# Patient Record
Sex: FEMALE | Race: BLACK OR AFRICAN AMERICAN | Employment: FULL TIME | URBAN - METROPOLITAN AREA
[De-identification: names, ages, dates, MRNs, and addresses within clinical notes are randomized per-mention and may not be internally consistent; named-entity substitution may affect disease eponyms.]

---

## 2017-03-25 ENCOUNTER — HOSPITAL ENCOUNTER (EMERGENCY)
Age: 37
Discharge: HOME | End: 2017-03-25
Attending: EMERGENCY MEDICINE
Payer: COMMERCIAL

## 2017-03-25 VITALS
WEIGHT: 143 LBS | OXYGEN SATURATION: 100 % | HEIGHT: 60 IN | HEART RATE: 115 BPM | SYSTOLIC BLOOD PRESSURE: 173 MMHG | DIASTOLIC BLOOD PRESSURE: 93 MMHG | BODY MASS INDEX: 28.07 KG/M2 | RESPIRATION RATE: 20 BRPM | TEMPERATURE: 102.6 F

## 2017-03-25 DIAGNOSIS — J11.1 INFLUENZA-LIKE ILLNESS: Primary | ICD-10-CM

## 2017-03-25 PROCEDURE — 99283 EMERGENCY DEPT VISIT LOW MDM: CPT

## 2017-03-25 PROCEDURE — 74011250637 HC RX REV CODE- 250/637: Performed by: EMERGENCY MEDICINE

## 2017-03-25 RX ORDER — IBUPROFEN 600 MG/1
600 TABLET ORAL
Qty: 20 TAB | Refills: 0 | Status: SHIPPED | OUTPATIENT
Start: 2017-03-25 | End: 2017-05-09

## 2017-03-25 RX ORDER — IBUPROFEN 600 MG/1
600 TABLET ORAL
Status: COMPLETED | OUTPATIENT
Start: 2017-03-25 | End: 2017-03-25

## 2017-03-25 RX ADMIN — IBUPROFEN 600 MG: 600 TABLET, FILM COATED ORAL at 21:03

## 2017-03-25 NOTE — LETTER
[address] 
[facility] EMERGENCY DEPT 
[address] [facility] [address] [address] [phone] Work/School Note Date: 3/25/2017 To Whom It May concern: 
 
Khadijah Ron was seen and treated today in the emergency room by the following provider(s): 
Attending Provider: Leslie Ward MD.   
 
Khadijah Ron may return to work on 4/01/2017.  
 
Sincerely, 
 
 
 
 
Leslie Ward MD

## 2017-03-26 NOTE — ED TRIAGE NOTES
Patient arrives with c/o generalized body aches, cough and fever onset today. Patient states her son was diagnosed with the flu.

## 2017-03-26 NOTE — ED PROVIDER NOTES
HPI Comments: 31 y.o. female with past medical history significant for headaches and hypothyroidism who presents from home with chief complaint of generalized myalgia. Patient arrives complaining of severe generalized myalgia and chills that started this morning. Patient states symptoms started when she got to work and did not diminish. Patient also complains of a cough with \"blood taste\" and fever. Patient denies taking any medicine for symptoms. Patient is currently on menstrual period. Patient denies urinary symptoms and receiving flu vaccination this year. There are no other acute medical concerns at this time. Social hx: Non smoker  PCP: Thania Snow MD    Note written by Stanly Holter. Priscila Ardon, as dictated by Mat Browning MD 8:48 PM      The history is provided by the patient. Past Medical History:   Diagnosis Date    Hypothyroidism, adult 9/11/2015    Left-sided muscle weakness 9/11/2015    Other ill-defined conditions(799.89)     headaches    Radiculopathy of cervical region 9/11/2015       Past Surgical History:   Procedure Laterality Date    HX GYN      D&C, tubal ligation         No family history on file. Social History     Social History    Marital status: UNKNOWN     Spouse name: N/A    Number of children: N/A    Years of education: N/A     Occupational History    Not on file. Social History Main Topics    Smoking status: Never Smoker    Smokeless tobacco: Never Used    Alcohol use Yes      Comment: occasionally    Drug use: No    Sexual activity: Not on file     Other Topics Concern    Not on file     Social History Narrative         ALLERGIES: Flagyl [metronidazole]    Review of Systems   Constitutional: Positive for chills and fever. HENT: Negative for ear pain and sore throat. Eyes: Negative for photophobia and pain. Respiratory: Positive for cough. Negative for chest tightness and shortness of breath.     Cardiovascular: Negative for chest pain and leg swelling. Gastrointestinal: Negative for abdominal pain, nausea and vomiting. Genitourinary: Negative for difficulty urinating, dysuria and flank pain. Musculoskeletal: Positive for myalgias. Negative for back pain and neck pain. Skin: Negative for rash and wound. Neurological: Negative for dizziness, light-headedness and headaches. Vitals:    03/25/17 2031   BP: (!) 173/93   Pulse: (!) 115   Resp: 20   Temp: (!) 102.6 °F (39.2 °C)   SpO2: 100%   Weight: 64.9 kg (143 lb)   Height: 5' (1.524 m)            Physical Exam   Constitutional: She is oriented to person, place, and time. She appears well-developed and well-nourished. No distress. HENT:   Head: Normocephalic and atraumatic. Mouth/Throat: Oropharynx is clear and moist.   Eyes: Conjunctivae and EOM are normal. Pupils are equal, round, and reactive to light. Neck: Normal range of motion. Cardiovascular: Regular rhythm, normal heart sounds and intact distal pulses. Tachycardia present. No murmur heard. Pulmonary/Chest: Effort normal and breath sounds normal. No stridor. No respiratory distress. She has no wheezes. She has no rales. Abdominal: Soft. Bowel sounds are normal. There is no tenderness. Musculoskeletal: Normal range of motion. She exhibits no edema or tenderness. Neurological: She is alert and oriented to person, place, and time. No cranial nerve deficit. Skin: Skin is warm and dry. She is not diaphoretic. Psychiatric: She has a normal mood and affect. Nursing note and vitals reviewed. MDM  Number of Diagnoses or Management Options  Influenza-like illness:   Diagnosis management comments: Patient with suspected influenza - sick contact at home - no need for testing  Will d/c home with supportive care instructions.       ED Course       Procedures

## 2017-03-26 NOTE — DISCHARGE INSTRUCTIONS
We hope that we have addressed all of your medical concerns. The examination and treatment you received in the Emergency Department were for an emergent problem and were not intended as complete care. It is important that you follow up with your healthcare provider(s) for ongoing care. If your symptoms worsen or do not improve as expected, and you are unable to reach your usual health care provider(s), you should return to the Emergency Department. Today's healthcare is undergoing tremendous change, and patient satisfaction surveys are one of the many tools to assess the quality of medical care. You may receive a survey from the [employer] regarding your experience in the Emergency Department. I hope that your experience has been completely positive, particularly the medical care that I provided. As such, please participate in the survey; anything less than excellent does not meet my expectations or intentions. [facility] and [facility] participate in nationally recognized quality of care measures. If your blood pressure is greater than 120/80, as reported below, we urge that you seek medical care to address the potential of high blood pressure, commonly known as hypertension. Hypertension can be hereditary or can be caused by certain medical conditions, pain, stress, or \"white coat syndrome. \"       Please make an appointment with your health care provider(s) for follow up of your Emergency Department visit. VITALS:   Patient Vitals for the past 8 hrs:   Temp Pulse Resp BP SpO2   03/25/17 2031 (!) 102.6 °F (39.2 °C) (!) 115 20 (!) 173/93 100 %          Thank you for allowing us to provide you with medical care today. We realize that you have many choices for your emergency care needs. Please choose us in the future for any continued health care needs. Charlie Vuong, [address] [facility] Emergency [place]: [phone]            No results found for this or any previous visit (from the past 24 hour(s)). No results found.

## 2017-03-27 ENCOUNTER — PATIENT OUTREACH (OUTPATIENT)
Dept: FAMILY MEDICINE CLINIC | Age: 37
End: 2017-03-27

## 2017-03-27 NOTE — PROGRESS NOTES
[address] [facility]  ED  Discharge Follow-Up        Patient listed on discharge [facility]) report on 3/25/17. Patient discharged from [facility] for Influenze-like illness. Panel Manager contacted the patient by telephone to perform post ED discharge assessment. Verified  and address with patient as identifiers. Provided introduction to self, and explanation of the Panel Manger role. Patient reports that she is doing a \"little better\". Patient stated that fever continue to come and go. Patient stated that she continue to take prescribed Ibuprofen. Patient BP and HR up increased at ED. Patient questioned if she was ever on BP medication and patient stated that she was once but was taken off. Medication: Patient discharged with new medication (Ibuprofen 600mg)  Performed medication reconciliation with patient, and patient verbalizes understanding of administration of home medications. There were no barriers to obtaining medications identified at this time. Discharge Instructions :  Reviewed discharge instructions with patient. Patient verbalizes understanding of discharge instructions and follow-up care. Plan: Patient to follow up with PCP tomorrow-should have BP checked and labs drawn     Goals        Post ED     Establish PCP relationships and regularly scheduled appointments. Patient will establish relationship with Dr. Kavin Oliva               PCP/Specialist follow up: Patient scheduled to follow up with Dr. Kavin Oliva on 3/28/17. Patient given an opportunity to ask questions. No other clinical/social/functional needs noted. The patient agrees to contact the PCP office for questions related to their healthcare. The patient expressed thanks, offered no additional questions and ended the call.

## 2017-03-28 ENCOUNTER — OFFICE VISIT (OUTPATIENT)
Dept: FAMILY MEDICINE CLINIC | Age: 37
End: 2017-03-28

## 2017-03-28 VITALS
HEIGHT: 60 IN | HEART RATE: 103 BPM | WEIGHT: 151.2 LBS | TEMPERATURE: 98.6 F | RESPIRATION RATE: 20 BRPM | SYSTOLIC BLOOD PRESSURE: 137 MMHG | DIASTOLIC BLOOD PRESSURE: 97 MMHG | BODY MASS INDEX: 29.68 KG/M2

## 2017-03-28 DIAGNOSIS — R68.89 FLU-LIKE SYMPTOMS: Primary | ICD-10-CM

## 2017-03-28 NOTE — PATIENT INSTRUCTIONS
DASH Diet: After Your Visit   Your Care Instructions   The DASH diet is an eating plan that can help lower your blood pressure. DASH stands for Dietary Approaches to Stop Hypertension. Hypertension is high blood pressure. The DASH diet focuses on eating foods that are high in calcium, potassium, and magnesium. These nutrients can lower blood pressure. The foods that are highest in these nutrients are fruits, vegetables, low-fat dairy products, nuts, seeds, and legumes. But taking calcium, potassium, and magnesium supplements instead of eating foods that are high in those nutrients does not have the same effect. The DASH diet also includes whole grains, fish, and poultry. The DASH diet is one of several lifestyle changes your doctor may recommend to lower your high blood pressure. Your doctor may also want you to decrease the amount of sodium in your diet. Lowering sodium while following the DASH diet can lower blood pressure even further than just the DASH diet alone. Follow-up care is a key part of your treatment and safety. Be sure to make and go to all appointments, and call your doctor if you are having problems. Its also a good idea to know your test results and keep a list of the medicines you take. How can you care for yourself at home? Following the DASH diet   Eat 4 to 5 servings of fruit each day. A serving is 1 medium-sized piece of fruit, 1/2 cup chopped or canned fruit, 1/4 cup dried fruit, or 4 ounces (1/2 cup) of fruit juice. Choose fruit more often than fruit juice. Eat 4 to 5 servings of vegetables each day. A serving is 1 cup of lettuce or raw leafy vegetables, 1/2 cup of chopped or cooked vegetables, or 4 ounces (1/2 cup) of vegetable juice. Choose vegetables more often than vegetable juice. Get 3 servings of low-fat dairy each day. A serving is 8 ounces of milk, 1 cup of yogurt, or 1 1/2 ounces of cheese. Eat 7 to 8 servings of grains each day.  A serving is 1 slice of bread, 1 ounce of dry cereal, or 1/2 cup of cooked rice, pasta, or cooked cereal. Try to choose whole-grain products as much as possible. Limit lean meat, poultry, and fish to 6 ounces each day. Six ounces is about the size of two decks of cards. Eat 4 to 5 servings of nuts, seeds, and legumes (cooked dried beans, lentils, and split peas) each week. A serving is 1/3 cup of nuts, 2 tablespoons of seeds, or 1/2 cup cooked dried beans or peas. Tips for success   Start small. Do not try to make dramatic changes to your diet all at once. You might feel that you are missing out on your favorite foods and then be more likely to not follow the plan. Make small changes, and stick with them. Once those changes become habit, add a few more changes. Try some of the following:   Make it a goal to eat a fruit or vegetable at every meal and at snacks. This will make it easy to get the recommended amount of fruits and vegetables each day. Try yogurt topped with fruit and nuts for a snack or healthy dessert. Add lettuce, tomato, cucumber, and onion to sandwiches. Combine a ready-made pizza crust with low-fat mozzarella cheese and lots of vegetable toppings. Try using tomatoes, squash, spinach, broccoli, carrots, cauliflower, and onions. Have a variety of cut-up vegetables with a low-fat dip as an appetizer instead of chips and dip. Sprinkle sunflower seeds or chopped almonds over salads. Or try adding chopped walnuts or almonds to cooked vegetables. Try some vegetarian meals using beans and peas. Add garbanzo or kidney beans to salads. Make burritos and tacos with mashed bustamante beans or black beans. Where can you learn more? Go to DealExplorer.be   Enter H967 in the search box to learn more about \"DASH Diet: After Your Visit. \"    © 0723-4173 Healthwise, Incorporated. Care instructions adapted under license by Tristian Lynn (which disclaims liability or warranty for this information).  This care instruction is for use with your licensed healthcare professional. If you have questions about a medical condition or this instruction, always ask your healthcare professional. [place] [address] any warranty or liability for your use of this information.    Content Version: 5.8.166727; Last Revised: March 24, 2010

## 2017-03-28 NOTE — MR AVS SNAPSHOT
Visit Information Date & Time Provider Department Dept. Phone Encounter #  
 3/28/2017 10:45 AM Armond EstradaDanyell 34 [id number] Follow-up Instructions Return in about 6 weeks (around 5/9/2017) for blood pressure. Your Appointments 3/31/2017 10:45 AM  
ROUTINE CARE with Armond Estrada MD  
[address] [place] [address]) Appt Note: ED follow up/COPD  
 [address] [facility] [address] [facility]  
[phone]  
  
   
 [address] [place] [address] [address] [address] Upcoming Health Maintenance Date Due DTaP/Tdap/Td series (1 - Tdap) 8/29/2003 PAP AKA CERVICAL CYTOLOGY 8/29/2003 INFLUENZA AGE 9 TO ADULT 8/1/2016 Allergies as of 3/28/2017  Review Complete On: 3/28/2017 By: Armond Estrada MD  
  
 Severity Noted Reaction Type Reactions Flagyl [Metronidazole]  01/28/2014    Diarrhea Current Immunizations  Never Reviewed No immunizations on file. Not reviewed this visit You Were Diagnosed With   
  
 Codes Comments Flu-like symptoms    -  Primary ICD-10-CM: R68.89 ICD-9-CM: 780.99 Elevated blood pressure     ICD-10-CM: I10 
ICD-9-CM: 401.9 Vitals BP Pulse Temp Resp Height(growth percentile) Weight(growth percentile) (!) 137/97 (BP 1 Location: Left arm, BP Patient Position: Sitting) (!) 103 98.6 °F (37 °C) (Oral) 20 5' (1.524 m) 151 lb 3.2 oz (68.6 kg) LMP BMI OB Status Smoking Status 03/25/2017 29.53 kg/m2 Having regular periods Never Smoker BMI and BSA Data Body Mass Index Body Surface Area  
 29.53 kg/m 2 1.7 m 2 Preferred Pharmacy Pharmacy Name Phone [facility] [address] [place] [address], [address] [place] [address] [phone] Your Updated Medication List  
  
   
This list is accurate as of: 3/28/17 12:01 PM.  Always use your most recent med list.  
  
  
  
  
 ibuprofen 600 mg tablet Commonly known as:  MOTRIN Take 1 Tab by mouth every six (6) hours as needed for Pain. Follow-up Instructions Return in about 6 weeks (around 5/9/2017) for blood pressure. Patient Instructions DASH Diet: After Your Visit Your Care Instructions The DASH diet is an eating plan that can help lower your blood pressure. DASH stands for Dietary Approaches to Stop Hypertension. Hypertension is high blood pressure. The DASH diet focuses on eating foods that are high in calcium, potassium, and magnesium. These nutrients can lower blood pressure. The foods that are highest in these nutrients are fruits, vegetables, low-fat dairy products, nuts, seeds, and legumes. But taking calcium, potassium, and magnesium supplements instead of eating foods that are high in those nutrients does not have the same effect. The DASH diet also includes whole grains, fish, and poultry. The DASH diet is one of several lifestyle changes your doctor may recommend to lower your high blood pressure. Your doctor may also want you to decrease the amount of sodium in your diet. Lowering sodium while following the DASH diet can lower blood pressure even further than just the DASH diet alone. Follow-up care is a key part of your treatment and safety. Be sure to make and go to all appointments, and call your doctor if you are having problems. Its also a good idea to know your test results and keep a list of the medicines you take. How can you care for yourself at home? Following the DASH diet Eat 4 to 5 servings of fruit each day. A serving is 1 medium-sized piece of fruit, 1/2 cup chopped or canned fruit, 1/4 cup dried fruit, or 4 ounces (1/2 cup) of fruit juice. Choose fruit more often than fruit juice. Eat 4 to 5 servings of vegetables each day.  A serving is 1 cup of lettuce or raw leafy vegetables, 1/2 cup of chopped or cooked vegetables, or 4 ounces (1/2 cup) of vegetable juice. Choose vegetables more often than vegetable juice. Get 3 servings of low-fat dairy each day. A serving is 8 ounces of milk, 1 cup of yogurt, or 1 1/2 ounces of cheese. Eat 7 to 8 servings of grains each day. A serving is 1 slice of bread, 1 ounce of dry cereal, or 1/2 cup of cooked rice, pasta, or cooked cereal. Try to choose whole-grain products as much as possible. Limit lean meat, poultry, and fish to 6 ounces each day. Six ounces is about the size of two decks of cards. Eat 4 to 5 servings of nuts, seeds, and legumes (cooked dried beans, lentils, and split peas) each week. A serving is 1/3 cup of nuts, 2 tablespoons of seeds, or 1/2 cup cooked dried beans or peas. Tips for success Start small. Do not try to make dramatic changes to your diet all at once. You might feel that you are missing out on your favorite foods and then be more likely to not follow the plan. Make small changes, and stick with them. Once those changes become habit, add a few more changes. Try some of the following:  
Make it a goal to eat a fruit or vegetable at every meal and at snacks. This will make it easy to get the recommended amount of fruits and vegetables each day. Try yogurt topped with fruit and nuts for a snack or healthy dessert. Add lettuce, tomato, cucumber, and onion to sandwiches. Combine a ready-made pizza crust with low-fat mozzarella cheese and lots of vegetable toppings. Try using tomatoes, squash, spinach, broccoli, carrots, cauliflower, and onions. Have a variety of cut-up vegetables with a low-fat dip as an appetizer instead of chips and dip. Sprinkle sunflower seeds or chopped almonds over salads. Or try adding chopped walnuts or almonds to cooked vegetables. Try some vegetarian meals using beans and peas. Add garbanzo or kidney beans to salads. Make burritos and tacos with mashed bustamante beans or black beans. Where can you learn more? Go to DealExplorer.be Enter K165 in the search box to learn more about \"DASH Diet: After Your Visit. \"   
© 5580-1005 Healthwise, Incorporated. Care instructions adapted under license by Marco Hollis (which disclaims liability or warranty for this information). This care instruction is for use with your licensed healthcare professional. If you have questions about a medical condition or this instruction, always ask your healthcare professional. [place] [address] any warranty or liability for your use of this information. Content Version: 5.7.460187; Last Revised: March 24, 2010 Introducing [facility]! Marco Hollis introduces myhomemove patient portal. Now you can access parts of your medical record, email your doctor's office, and request medication refills online. 1. In your internet browser, go to [email or web address] 2. Click on the First Time User? Click Here link in the Sign In box. You will see the New Member Sign Up page. 3. Enter your myhomemove Access Code exactly as it appears below. You will not need to use this code after youve completed the sign-up process. If you do not sign up before the expiration date, you must request a new code. · myhomemove Access Code: [email or web address] Expires: 6/23/2017  8:55 PM 
 
4. Enter the last four digits of your Social Security Number (xxxx) and Date of Birth (mm/dd/yyyy) as indicated and click Submit. You will be taken to the next sign-up page. 5. Create a myhomemove ID. This will be your myhomemove login ID and cannot be changed, so think of one that is secure and easy to remember. 6. Create a myhomemove password. You can change your password at any time. 7. Enter your Password Reset Question and Answer. This can be used at a later time if you forget your password. 8. Enter your e-mail address. You will receive e-mail notification when new information is available in [address] 9. Click Sign Up. You can now view and download portions of your medical record. 10. Click the Download Summary menu link to download a portable copy of your medical information. If you have questions, please visit the Frequently Asked Questions section of the [employer] website. Remember, [employer] is NOT to be used for urgent needs. For medical emergencies, dial 911. Now available from your iPhone and Android! Please provide this summary of care documentation to your next provider. Your primary care clinician is listed as Yadira Bright. If you have any questions after today's visit, please call [phone].

## 2017-03-28 NOTE — PROGRESS NOTES
HISTORY OF PRESENT ILLNESS  Allie Humphrey is a 31 y.o. female. Flu   The history is provided by the patient (she was seen in the ER on the 25th for a flu like illnes and was told to follow up because her blood pressure was high. Her mother has HTN.). This is a new problem. Episode onset: 3 days ago. The problem occurs constantly. The problem has been gradually improving. Associated symptoms include chest pain and headaches. Pertinent negatives include no abdominal pain and no shortness of breath. Associated symptoms comments: Her chest hurst with coughing. Nothing aggravates the symptoms. The symptoms are relieved by medications. Treatments tried: Motrin, Delsym. The treatment provided moderate relief. Review of Systems   Constitutional: Positive for malaise/fatigue. Negative for chills, fever and weight loss. No weight gain   HENT: Positive for congestion. Negative for ear pain and sore throat. Mucous is clear in color. There is sinus pain. Eyes: Negative for blurred vision, discharge and redness. Respiratory: Negative for cough, sputum production, shortness of breath and wheezing. Cardiovascular: Positive for chest pain. Negative for leg swelling. Gastrointestinal: Negative for abdominal pain. Musculoskeletal: Negative for myalgias. Neurological: Positive for weakness and headaches. Negative for dizziness, sensory change, speech change and focal weakness. Visit Vitals    BP (!) 137/97 (BP 1 Location: Left arm, BP Patient Position: Sitting)    Pulse (!) 103    Temp 98.6 °F (37 °C) (Oral)    Resp 20    Ht 5' (1.524 m)    Wt 151 lb 3.2 oz (68.6 kg)    LMP 03/25/2017    BMI 29.53 kg/m2     BP Readings from Last 3 Encounters:   03/28/17 (!) 137/97   03/25/17 (!) 173/93   10/06/15 134/72     Physical Exam   Constitutional: She is oriented to person, place, and time. She appears well-developed and well-nourished. No distress. HENT:   Head: Normocephalic.    Right Ear: Tympanic membrane, external ear and ear canal normal.   Left Ear: Tympanic membrane, external ear and ear canal normal.   Nose: Right sinus exhibits maxillary sinus tenderness. Right sinus exhibits no frontal sinus tenderness. Left sinus exhibits maxillary sinus tenderness. Left sinus exhibits no frontal sinus tenderness. Mouth/Throat: Uvula is midline, oropharynx is clear and moist and mucous membranes are normal.   Eyes: Right eye exhibits no discharge. Left eye exhibits no discharge. Right conjunctiva is not injected. Left conjunctiva is not injected. Cardiovascular: Normal rate, regular rhythm and normal heart sounds. Exam reveals no gallop and no friction rub. No murmur heard. Pulmonary/Chest: Effort normal and breath sounds normal. No respiratory distress. She has no wheezes. She has no rales. Musculoskeletal: She exhibits no edema. Lymphadenopathy:     She has no cervical adenopathy. Neurological: She is alert and oriented to person, place, and time. Skin: Skin is warm and dry. She is not diaphoretic. Nursing note and vitals reviewed. ASSESSMENT and PLAN    ICD-10-CM ICD-9-CM    1. Flu-like symptoms R68.89 780.99    2. Elevated blood pressure I10 401.9         Flu symptoms improving  Blood pressure again elevated  Rest, push fluids, Motrin prn    Follow-up Disposition:  Return in about 6 weeks (around 5/9/2017) for blood pressure, migraines. Reviewed plan of care. Patient has provided input and agrees with goals.

## 2017-04-10 ENCOUNTER — PATIENT OUTREACH (OUTPATIENT)
Dept: FAMILY MEDICINE CLINIC | Age: 37
End: 2017-04-10

## 2017-04-10 NOTE — PROGRESS NOTES
NN Follow-Up          Follow up call placed to patient. Patient discharged from [facility] on 3/25/17 for flu-like illness. Panel Manager contacted the patient by telephone to perform post ED discharge follow up. No answer, message left requesting return call. Will continue to reach out to patient regarding ED follow up. Patient has followed up with PCP.

## 2017-04-25 ENCOUNTER — PATIENT OUTREACH (OUTPATIENT)
Dept: FAMILY MEDICINE CLINIC | Age: 37
End: 2017-04-25

## 2017-04-26 NOTE — PROGRESS NOTES
Panel Manager will resolve  3/25/17  ED Episode. Patient has been contacted by phone and mail without any response. No sign that patient has return to Ed in the last 30 days.

## 2017-05-09 ENCOUNTER — OFFICE VISIT (OUTPATIENT)
Dept: FAMILY MEDICINE CLINIC | Age: 37
End: 2017-05-09

## 2017-05-09 VITALS
RESPIRATION RATE: 16 BRPM | WEIGHT: 152 LBS | HEIGHT: 60 IN | SYSTOLIC BLOOD PRESSURE: 141 MMHG | BODY MASS INDEX: 29.84 KG/M2 | HEART RATE: 78 BPM | TEMPERATURE: 98.3 F | DIASTOLIC BLOOD PRESSURE: 97 MMHG

## 2017-05-09 DIAGNOSIS — I10 ESSENTIAL HYPERTENSION: Primary | ICD-10-CM

## 2017-05-09 RX ORDER — HYDROCHLOROTHIAZIDE 12.5 MG/1
12.5 TABLET ORAL DAILY
Qty: 30 TAB | Refills: 1 | Status: SHIPPED | OUTPATIENT
Start: 2017-05-09 | End: 2017-07-05 | Stop reason: SDUPTHER

## 2017-05-09 NOTE — PROGRESS NOTES
HISTORY OF PRESENT ILLNESS  Lobito Oreilly is a 31 y.o. female. Other   The history is provided by the patient (this is the third time her blood pressure has been elevated. Her mother has HTN.). This is a new problem. The current episode started more than 1 week ago. The problem occurs constantly. The problem has been gradually improving. Associated symptoms include headaches. Pertinent negatives include no chest pain, no abdominal pain and no shortness of breath. Associated symptoms comments: She gets headaches when she gets tired. Nothing aggravates the symptoms. Nothing relieves the symptoms. She has tried nothing for the symptoms. Review of Systems   Constitutional: Negative for weight loss. No weight gain   Eyes: Negative for blurred vision. Respiratory: Negative for shortness of breath. Cardiovascular: Negative for chest pain and leg swelling. Gastrointestinal: Negative for abdominal pain. Neurological: Positive for headaches. Negative for dizziness, sensory change, speech change and focal weakness. Visit Vitals    BP (!) 141/97 (BP 1 Location: Left arm, BP Patient Position: Sitting)    Pulse 78    Temp 98.3 °F (36.8 °C) (Oral)    Resp 16    Ht 5' (1.524 m)    Wt 152 lb (68.9 kg)    LMP 04/30/2017 (Approximate)    BMI 29.69 kg/m2     BP Readings from Last 3 Encounters:   05/09/17 (!) 141/97   03/28/17 (!) 137/97   03/25/17 (!) 173/93       Physical Exam   Constitutional: She is oriented to person, place, and time. She appears well-developed and well-nourished. No distress. Cardiovascular: Normal rate, regular rhythm and normal heart sounds. Exam reveals no gallop and no friction rub. No murmur heard. Pulmonary/Chest: Effort normal and breath sounds normal. No respiratory distress. She has no wheezes. She has no rales. Musculoskeletal: She exhibits no edema. Neurological: She is alert and oriented to person, place, and time. Skin: Skin is warm and dry.  She is not diaphoretic. Nursing note and vitals reviewed. ASSESSMENT and PLAN    ICD-10-CM ICD-9-CM    1. Essential hypertension C53 398.8 METABOLIC PANEL, COMPREHENSIVE      CBC WITH AUTOMATED DIFF      hydroCHLOROthiazide (HYDRODIURIL) 12.5 mg tablet        Newly diagnosed hypertension  Continue with DASH diet, regular exercise, weight loss  Labs per orders. At first she stated that she refuses to take medication, however, she said she would consider it at the end of the visit  Hydrochlorothiazide prescription sent to pharmacy  Complications of uncontrolled HTN discussed    Follow-up Disposition:  Return in about 6 weeks (around 6/20/2017) for blood pressure. Reviewed plan of care. Patient has provided input and agrees with goals.

## 2017-05-09 NOTE — PATIENT INSTRUCTIONS
Learning About High Blood Pressure  What is high blood pressure? Blood pressure is a measure of how hard the blood pushes against the walls of your arteries. It's normal for blood pressure to go up and down throughout the day, but if it stays up, you have high blood pressure. Another name for high blood pressure is hypertension. Two numbers tell you your blood pressure. The first number is the systolic pressure. It shows how hard the blood pushes when your heart is pumping. The second number is the diastolic pressure. It shows how hard the blood pushes between heartbeats, when your heart is relaxed and filling with blood. A blood pressure of less than 120/80 (say \"120 over 80\") is ideal for an adult. High blood pressure is 140/90 or higher. You have high blood pressure if your top number is 140 or higher or your bottom number is 90 or higher, or both. Many people fall into the category in between, called prehypertension. People with prehypertension need to make lifestyle changes to bring their blood pressure down and help prevent or delay high blood pressure. What happens when you have high blood pressure? · Blood flows through your arteries with too much force. Over time, this damages the walls of your arteries. But you can't feel it. High blood pressure usually doesn't cause symptoms. · Fat and calcium start to build up in your arteries. This buildup is called plaque. Plaque makes your arteries narrower and stiffer. Blood can't flow through them as easily. · This lack of good blood flow starts to damage some of the organs in your body. This can lead to problems such as coronary artery disease and heart attack, heart failure, stroke, kidney failure, and eye damage. How can you prevent high blood pressure? · Stay at a healthy weight. · Try to limit how much sodium you eat to less than 2,300 milligrams (mg) a day.  If you limit your sodium to 1,500 mg a day, you can lower your blood pressure even more.  ¨ Buy foods that are labeled \"unsalted,\" \"sodium-free,\" or \"low-sodium. \" Foods labeled \"reduced-sodium\" and \"light sodium\" may still have too much sodium. ¨ Flavor your food with garlic, lemon juice, onion, vinegar, herbs, and spices instead of salt. Do not use soy sauce, steak sauce, onion salt, garlic salt, mustard, or ketchup on your food. ¨ Use less salt (or none) when recipes call for it. You can often use half the salt a recipe calls for without losing flavor. · Be physically active. Get at least 30 minutes of exercise on most days of the week. Walking is a good choice. You also may want to do other activities, such as running, swimming, cycling, or playing tennis or team sports. · Limit alcohol to 2 drinks a day for men and 1 drink a day for women. · Eat plenty of fruits, vegetables, and low-fat dairy products. Eat less saturated and total fats. How is high blood pressure treated? · Your doctor will suggest making lifestyle changes. For example, your doctor may ask you to eat healthy foods, quit smoking, lose extra weight, and be more active. · If lifestyle changes don't help enough or your blood pressure is very high, you will have to take medicine every day. Follow-up care is a key part of your treatment and safety. Be sure to make and go to all appointments, and call your doctor if you are having problems. It's also a good idea to know your test results and keep a list of the medicines you take. Where can you learn more? Go to http://vera-douglas.info/. Enter P501 in the search box to learn more about \"Learning About High Blood Pressure. \"  Current as of: March 23, 2016  Content Version: 11.2  © 1854-1673 [employer]. Care instructions adapted under license by [employer] (which disclaims liability or warranty for this information).  If you have questions about a medical condition or this instruction, always ask your healthcare professional. [employer] disclaims any warranty or liability for your use of this information.

## 2017-05-09 NOTE — MR AVS SNAPSHOT
Visit Information Date & Time Provider Department Dept. Phone Encounter #  
 5/9/2017 10:30 AM Barak David [address] [id number] Follow-up Instructions Return in about 6 weeks (around 6/20/2017) for blood pressure. Upcoming Health Maintenance Date Due  
 PAP AKA CERVICAL CYTOLOGY 8/29/2003 INFLUENZA AGE 9 TO ADULT 8/1/2017 DTaP/Tdap/Td series (2 - Td) 5/9/2027 Allergies as of 5/9/2017  Review Complete On: 5/9/2017 By: Barak David MD  
  
 Severity Noted Reaction Type Reactions Flagyl [Metronidazole]  01/28/2014    Diarrhea Current Immunizations  Reviewed on 5/9/2017 No immunizations on file. Reviewed by Gregorio Shultz LPN on 4/0/9098 at 92:13 AM  
You Were Diagnosed With   
  
 Codes Comments Essential hypertension    -  Primary ICD-10-CM: I10 
ICD-9-CM: 401.9 Vitals BP Pulse Temp Resp Height(growth percentile) Weight(growth percentile) (!) 141/97 (BP 1 Location: Left arm, BP Patient Position: Sitting) 78 98.3 °F (36.8 °C) (Oral) 16 5' (1.524 m) 152 lb (68.9 kg) LMP BMI OB Status Smoking Status 04/30/2017 (Approximate) 29.69 kg/m2 Having regular periods Never Smoker BMI and BSA Data Body Mass Index Body Surface Area  
 29.69 kg/m 2 1.71 m 2 Preferred Pharmacy Pharmacy Name Phone [facility] [address] [place] [address] [phone] Your Updated Medication List  
  
   
This list is accurate as of: 5/9/17 11:19 AM.  Always use your most recent med list.  
  
  
  
  
 hydroCHLOROthiazide 12.5 mg tablet Commonly known as:  HYDRODIURIL Take 1 Tab by mouth daily. Prescriptions Sent to Pharmacy Refills  
 hydroCHLOROthiazide (HYDRODIURIL) 12.5 mg tablet 1 Sig: Take 1 Tab by mouth daily.   
 Class: Normal  
 Pharmacy: [facility]pharm[facility] [address] [facility] [address] [place]  #: [phone] Route: Oral  
  
We Performed the Following CBC WITH AUTOMATED DIFF [21584 CPT(R)] METABOLIC PANEL, COMPREHENSIVE [08000 CPT(R)] Follow-up Instructions Return in about 6 weeks (around 6/20/2017) for blood pressure. Patient Instructions Learning About High Blood Pressure What is high blood pressure? Blood pressure is a measure of how hard the blood pushes against the walls of your arteries. It's normal for blood pressure to go up and down throughout the day, but if it stays up, you have high blood pressure. Another name for high blood pressure is hypertension. Two numbers tell you your blood pressure. The first number is the systolic pressure. It shows how hard the blood pushes when your heart is pumping. The second number is the diastolic pressure. It shows how hard the blood pushes between heartbeats, when your heart is relaxed and filling with blood. A blood pressure of less than 120/80 (say \"120 over 80\") is ideal for an adult. High blood pressure is 140/90 or higher. You have high blood pressure if your top number is 140 or higher or your bottom number is 90 or higher, or both. Many people fall into the category in between, called prehypertension. People with prehypertension need to make lifestyle changes to bring their blood pressure down and help prevent or delay high blood pressure. What happens when you have high blood pressure? · Blood flows through your arteries with too much force. Over time, this damages the walls of your arteries. But you can't feel it. High blood pressure usually doesn't cause symptoms. · Fat and calcium start to build up in your arteries. This buildup is called plaque. Plaque makes your arteries narrower and stiffer. Blood can't flow through them as easily. · This lack of good blood flow starts to damage some of the organs in your body.  This can lead to problems such as coronary artery disease and heart attack, heart failure, stroke, kidney failure, and eye damage. How can you prevent high blood pressure? · Stay at a healthy weight. · Try to limit how much sodium you eat to less than 2,300 milligrams (mg) a day. If you limit your sodium to 1,500 mg a day, you can lower your blood pressure even more. ¨ Buy foods that are labeled \"unsalted,\" \"sodium-free,\" or \"low-sodium. \" Foods labeled \"reduced-sodium\" and \"light sodium\" may still have too much sodium. ¨ Flavor your food with garlic, lemon juice, onion, vinegar, herbs, and spices instead of salt. Do not use soy sauce, steak sauce, onion salt, garlic salt, mustard, or ketchup on your food. ¨ Use less salt (or none) when recipes call for it. You can often use half the salt a recipe calls for without losing flavor. · Be physically active. Get at least 30 minutes of exercise on most days of the week. Walking is a good choice. You also may want to do other activities, such as running, swimming, cycling, or playing tennis or team sports. · Limit alcohol to 2 drinks a day for men and 1 drink a day for women. · Eat plenty of fruits, vegetables, and low-fat dairy products. Eat less saturated and total fats. How is high blood pressure treated? · Your doctor will suggest making lifestyle changes. For example, your doctor may ask you to eat healthy foods, quit smoking, lose extra weight, and be more active. · If lifestyle changes don't help enough or your blood pressure is very high, you will have to take medicine every day. Follow-up care is a key part of your treatment and safety. Be sure to make and go to all appointments, and call your doctor if you are having problems. It's also a good idea to know your test results and keep a list of the medicines you take. Where can you learn more? Go to http://vera-douglas.info/. Enter P501 in the search box to learn more about \"Learning About High Blood Pressure. \" Current as of: March 23, 2016 Content Version: 11.2 © 3027-0212 [employer]. Care instructions adapted under license by [employer] (which disclaims liability or warranty for this information). If you have questions about a medical condition or this instruction, always ask your healthcare professional. [address] any warranty or liability for your use of this information. Introducing [facility]! Joy Muñoz introduces amiando patient portal. Now you can access parts of your medical record, email your doctor's office, and request medication refills online. 1. In your internet browser, go to [email or web address] 2. Click on the First Time User? Click Here link in the Sign In box. You will see the New Member Sign Up page. 3. Enter your amiando Access Code exactly as it appears below. You will not need to use this code after youve completed the sign-up process. If you do not sign up before the expiration date, you must request a new code. · amiando Access Code: [email or web address] Expires: 6/23/2017  8:55 PM 
 
4. Enter the last four digits of your Social Security Number (xxxx) and Date of Birth (mm/dd/yyyy) as indicated and click Submit. You will be taken to the next sign-up page. 5. Create a amiando ID. This will be your amiando login ID and cannot be changed, so think of one that is secure and easy to remember. 6. Create a amiando password. You can change your password at any time. 7. Enter your Password Reset Question and Answer. This can be used at a later time if you forget your password. 8. Enter your e-mail address. You will receive e-mail notification when new information is available in [address] 9. Click Sign Up. You can now view and download portions of your medical record. 10. Click the Download Summary menu link to download a portable copy of your medical information. If you have questions, please visit the Frequently Asked Questions section of the [employer] website. Remember, [employer] is NOT to be used for urgent needs. For medical emergencies, dial 911. Now available from your iPhone and Android! Please provide this summary of care documentation to your next provider. Your primary care clinician is listed as Caterina Sampson. If you have any questions after today's visit, please call [phone].

## 2017-07-05 DIAGNOSIS — I10 ESSENTIAL HYPERTENSION: ICD-10-CM

## 2017-07-05 NOTE — LETTER
7/10/2017 10:29 AM 
 
Ms. Yris Guido [address] [facility] [address] Dear Ms. Clark: 
 
We've missed you! Please call our office at [phone] and schedule a blood pressure follow up appointment for your continued care. Look forward to seeing you soon.   
 
 
 
Sincerely, 
 
 
Kyle Vines MD

## 2017-07-06 RX ORDER — HYDROCHLOROTHIAZIDE 12.5 MG/1
12.5 TABLET ORAL DAILY
Qty: 30 TAB | Refills: 0 | Status: SHIPPED | OUTPATIENT
Start: 2017-07-06 | End: 2017-08-06 | Stop reason: SDUPTHER

## 2017-08-06 DIAGNOSIS — I10 ESSENTIAL HYPERTENSION: ICD-10-CM

## 2017-08-06 NOTE — LETTER
8/10/2017 11:17 AM 
 
Ms. Edgar Nevarez [address] [place] [address] Dear Ms. Clark: 
 
We've missed you! Please call our office at [phone] and schedule a blood preesure follow up appointment for your continued care. Look forward to seeing you soon.   
 
 
 
Sincerely, 
 
 
Madisyn Baldwin MD

## 2017-08-09 RX ORDER — HYDROCHLOROTHIAZIDE 12.5 MG/1
TABLET ORAL
Qty: 30 TAB | Refills: 1 | Status: SHIPPED | OUTPATIENT
Start: 2017-08-09 | End: 2017-08-10 | Stop reason: SDUPTHER

## 2017-08-09 NOTE — TELEPHONE ENCOUNTER
Please call patient and schedule them for a blood pressure follow up. Let her know I cannot continue to refill her medication until she comes in.

## 2017-08-10 DIAGNOSIS — I10 ESSENTIAL HYPERTENSION: ICD-10-CM

## 2017-08-10 RX ORDER — HYDROCHLOROTHIAZIDE 12.5 MG/1
TABLET ORAL
Qty: 90 TAB | Refills: 0 | Status: SHIPPED | OUTPATIENT
Start: 2017-08-10 | End: 2018-01-17

## 2017-11-30 ENCOUNTER — TELEPHONE (OUTPATIENT)
Dept: FAMILY MEDICINE CLINIC | Age: 37
End: 2017-11-30

## 2018-01-17 ENCOUNTER — OFFICE VISIT (OUTPATIENT)
Dept: FAMILY MEDICINE CLINIC | Age: 38
End: 2018-01-17

## 2018-01-17 VITALS
BODY MASS INDEX: 29.84 KG/M2 | DIASTOLIC BLOOD PRESSURE: 104 MMHG | SYSTOLIC BLOOD PRESSURE: 143 MMHG | TEMPERATURE: 98.8 F | WEIGHT: 152 LBS | RESPIRATION RATE: 18 BRPM | HEIGHT: 60 IN | HEART RATE: 83 BPM

## 2018-01-17 DIAGNOSIS — L84 CALLUS OF FOOT: ICD-10-CM

## 2018-01-17 DIAGNOSIS — I10 ESSENTIAL HYPERTENSION: ICD-10-CM

## 2018-01-17 DIAGNOSIS — E03.9 HYPOTHYROIDISM, ADULT: ICD-10-CM

## 2018-01-17 DIAGNOSIS — Z01.818 PREOPERATIVE GENERAL PHYSICAL EXAMINATION: Primary | ICD-10-CM

## 2018-01-17 RX ORDER — HYDROCHLOROTHIAZIDE 12.5 MG/1
TABLET ORAL
Qty: 90 TAB | Refills: 0 | Status: SHIPPED | OUTPATIENT
Start: 2018-01-17 | End: 2018-01-22 | Stop reason: SDUPTHER

## 2018-01-17 RX ORDER — HYDROCHLOROTHIAZIDE 12.5 MG/1
TABLET ORAL
Qty: 30 TAB | Refills: 0 | Status: SHIPPED | OUTPATIENT
Start: 2018-01-17 | End: 2018-01-17 | Stop reason: SDUPTHER

## 2018-01-17 NOTE — PROGRESS NOTES
Preoperative Evaluation    Date of Exam: 2018    Evelyn Duvall is a 30 y.o. female (:1980) who presents for preoperative evaluation. She is going to have a left callus removed and repaired on the . Latex Allergy: no    Problem List:     Patient Active Problem List    Diagnosis Date Noted    Essential hypertension 2017    Hypothyroidism, adult 2015    Radiculopathy of cervical region 2015     Medical History:     Past Medical History:   Diagnosis Date    Essential hypertension 2017    Hypothyroidism, adult 2015    Radiculopathy of cervical region 2015     Allergies: Allergies   Allergen Reactions    Flagyl [Metronidazole] Diarrhea      Medications:     Current Outpatient Prescriptions   Medication Sig    hydroCHLOROthiazide (HYDRODIURIL) 25 mg tablet TAKE 1 TAB BY MOUTH DAILY. No current facility-administered medications for this visit. Surgical History:     Past Surgical History:   Procedure Laterality Date    HX GYN      D&C, tubal ligation     Social History:     Social History     Social History    Marital status:      Spouse name: N/A    Number of children: N/A    Years of education: N/A     Social History Main Topics    Smoking status: Never Smoker    Smokeless tobacco: Never Used    Alcohol use Yes      Comment: occasionally    Drug use: No    Sexual activity: Not Asked     Other Topics Concern    None     Social History Narrative       Anesthesia Complications: None  History of abnormal bleeding : None  History of Blood Transfusions: no  Health Care Directive or Living Will: no    Objective:     ROS:   Feeling well. No dyspnea or chest pain on exertion. No abdominal pain, change in bowel habits, black or bloody stools. No urinary tract symptoms. No neurological complaints except for headaches. OBJECTIVE:   The patient appears well, alert, oriented x 3, in no distress.   Visit Vitals    BP (!) 143/104 (BP 1 Location: Right arm, BP Patient Position: Sitting)    Pulse 83    Temp 98.8 °F (37.1 °C) (Oral)    Resp 18    Ht 5' (1.524 m)    Wt 152 lb (68.9 kg)    LMP 01/16/2018    BMI 29.69 kg/m2   Repeat blood pressure 1/22/18:  144/90, hydrochlorothiazide increased to 25 mg    HEENT:ENT normal.  Neck supple. No adenopathy or thyromegaly. Chest: Lungs are clear, good air entry, no wheezes, rhonchi or rales. Cardiovascular: S1 and S2 normal, no murmurs, regular rate and rhythm. Abdomen: soft without tenderness, guarding, mass or organomegaly. Extremities: show no edema, normal peripheral pulses. Neurological: is normal, no focal findings. Skin:  Callus on the sole of the left foot          ICD-10-CM ICD-9-CM    1. Preoperative general physical examination Z01.818 V72.83    2. Callus of foot L84 700    3. Essential hypertension I10 401.9 hydroCHLOROthiazide (HYDRODIURIL) 12.5 mg tablet      METABOLIC PANEL, COMPREHENSIVE   4. Hypothyroidism, adult E03.9 244.9 TSH 3RD GENERATION        Not taking her hydrochlorothiazide  Restart hydrochlorothiazide  Labs per orders. Follow-up Disposition:  Return in about 5 days (around 1/22/2018) for blood pressure check. 1/22/18: IMPRESSION:   Low risk for planned surgery  No contraindications to planned surgery        Reviewed plan of care. Patient has provided input and agrees with goals.           Santos Miller MD   1/17/2018

## 2018-01-17 NOTE — MR AVS SNAPSHOT
[address] [facility] 
[phone] Patient: Sheridan Latham MRN: [id number] FJM:0[birth year]22/[id number] Visit Information Date & Time Provider Department Dept. Phone Encounter #  
 1/17/2018 11:15 AM Welton Lesches, MD [address] [id number] Your Appointments 1/25/2018  3:30 PM  
ROUTINE CARE with Welton Lesches, MD  
[address] [facility]) Appt Note: f/u BP  
 [address] [facility]  
[phone]  
  
   
 [address] [address] Upcoming Health Maintenance Date Due  
 PAP AKA CERVICAL CYTOLOGY 8/29/2003 Influenza Age 5 to Adult 8/1/2017 DTaP/Tdap/Td series (2 - Td) 5/9/2027 Allergies as of 1/17/2018  Review Complete On: 1/17/2018 By: Welton Lesches, MD  
  
 Severity Noted Reaction Type Reactions Flagyl [Metronidazole]  01/28/2014    Diarrhea Current Immunizations  Reviewed on 5/9/2017 No immunizations on file. Not reviewed this visit You Were Diagnosed With   
  
 Codes Comments Preoperative general physical examination    -  Primary ICD-10-CM: R45.531 ICD-9-CM: V72.83 Callus of foot     ICD-10-CM: L84 
ICD-9-CM: 460 Essential hypertension     ICD-10-CM: I10 
ICD-9-CM: 401.9 Hypothyroidism, adult     ICD-10-CM: E03.9 ICD-9-CM: 095. 9 Vitals BP Pulse Temp Resp Height(growth percentile) Weight(growth percentile) (!) 143/104 (BP 1 Location: Right arm, BP Patient Position: Sitting) 83 98.8 °F (37.1 °C) (Oral) 18 5' (1.524 m) 152 lb (68.9 kg) LMP BMI OB Status Smoking Status 01/16/2018 29.69 kg/m2 Having regular periods Never Smoker Vitals History BMI and BSA Data Body Mass Index Body Surface Area  
 29.69 kg/m 2 1.71 m 2 Preferred Pharmacy Pharmacy [facility] Phone [facility] [address], [address] [place] [address] [phone] Your Updated Medication List  
  
   
This list is accurate as of: 1/17/18 12:11 PM.  Always use your most recent med list.  
  
  
  
  
 hydroCHLOROthiazide 12.5 mg tablet Commonly known as:  HYDRODIURIL  
TAKE 1 TAB BY MOUTH DAILY. Prescriptions Sent to Pharmacy Refills  
 hydroCHLOROthiazide (HYDRODIURIL) 12.5 mg tablet 0 Sig: TAKE 1 TAB BY MOUTH DAILY. Class: Normal  
 Pharmacy: [facility] , [address] [place] [address] #: [phone] We Performed the Following METABOLIC PANEL, COMPREHENSIVE [20030 CPT(R)] TSH 3RD GENERATION [09652 CPT(R)] Introducing [facility] SERVICES! [facility] introduces [employer] patient portal. Now you can access parts of your medical record, email your doctor's office, and request medication refills online. 1. In your internet browser, go to [email or web address] 2. Click on the First Time User? Click Here link in the Sign In box. You will see the New Member Sign Up page. 3. Enter your [employer] Access Code exactly as it appears below. You will not need to use this code after youve completed the sign-up process. If you do not sign up before the expiration date, you must request a new code. · [employer] Access Code: [email or web address] Expires: 4/17/2018 11:02 AM 
 
4. Enter the last four digits of your Social Security Number (xxxx) and Date of Birth (mm/dd/yyyy) as indicated and click Submit. You will be taken to the next sign-up page. 5. Create a Live Current Mediat ID. This will be your [employer] login ID and cannot be changed, so think of one that is secure and easy to remember. 6. Create a [employer] password. You can change your password at any time. 7. Enter your Password Reset Question and Answer. This can be used at a later time if you forget your password. 8. Enter your e-mail address. You will receive e-mail notification when new information is available in [address] 9. Click Sign Up. You can now view and download portions of your medical record. 10. Click the Download Summary menu link to download a portable copy of your medical information. If you have questions, please visit the Frequently Asked Questions section of the [employer] website. Remember, [employer] is NOT to be used for urgent needs. For medical emergencies, dial 911. Now available from your iPhone and Android! Please provide this summary of care documentation to your next provider. Your primary care clinician is listed as Shahrzad Murphy. If you have any questions after today's visit, please call [phone].

## 2018-01-22 ENCOUNTER — OFFICE VISIT (OUTPATIENT)
Dept: FAMILY MEDICINE CLINIC | Age: 38
End: 2018-01-22

## 2018-01-22 ENCOUNTER — TELEPHONE (OUTPATIENT)
Dept: FAMILY MEDICINE CLINIC | Age: 38
End: 2018-01-22

## 2018-01-22 VITALS
HEART RATE: 100 BPM | BODY MASS INDEX: 29.64 KG/M2 | SYSTOLIC BLOOD PRESSURE: 144 MMHG | DIASTOLIC BLOOD PRESSURE: 90 MMHG | RESPIRATION RATE: 18 BRPM | WEIGHT: 151 LBS | TEMPERATURE: 98.7 F | HEIGHT: 60 IN

## 2018-01-22 DIAGNOSIS — I10 ESSENTIAL HYPERTENSION: Primary | ICD-10-CM

## 2018-01-22 DIAGNOSIS — I10 ESSENTIAL HYPERTENSION: ICD-10-CM

## 2018-01-22 RX ORDER — HYDROCHLOROTHIAZIDE 25 MG/1
TABLET ORAL
Qty: 90 TAB | Refills: 0 | Status: SHIPPED | OUTPATIENT
Start: 2018-01-22 | End: 2018-01-25 | Stop reason: SDUPTHER

## 2018-01-22 NOTE — TELEPHONE ENCOUNTER
Pt called to advise her podiatrist's office is looking for Pre-Op forms to be faxed to them and must receive them prior to 12 noon tomorrow (1/23/18). Pt advised she's been cleared for surgery and Dr. Addy Lemus will complete her note to attach to forms to be faxed prior to her surgery. Pt agrees to plan.  Chas

## 2018-01-22 NOTE — PROGRESS NOTES
Chief Complaint   Patient presents with    Blood Pressure Check     for clearance for surgery     1. Have you been to the ER, urgent care clinic since your last visit? No Hospitalized since your last visit? No    2. Have you seen or consulted any other health care providers outside of the [address] [place] [address] since your last visit? Include any pap smears or colon screening.  No

## 2018-01-22 NOTE — PROGRESS NOTES
HISTORY OF PRESENT ILLNESS  Raymond Parekh is a 30 y.o. female. Blood Pressure Check   The history is provided by the patient. This is a chronic problem. The current episode started more than 1 week ago. The problem occurs constantly. Pertinent negatives include no chest pain, no abdominal pain, no headaches and no shortness of breath. Nothing aggravates the symptoms. The symptoms are relieved by medications. Treatments tried: HCTZ. Review of Systems   Constitutional: Negative for weight loss. No weight gain   Eyes: Negative for blurred vision. Respiratory: Negative for shortness of breath. Cardiovascular: Negative for chest pain and leg swelling. Gastrointestinal: Negative for abdominal pain. Neurological: Negative for dizziness, sensory change, speech change, focal weakness and headaches. Visit Vitals    /90  Comment: left arm, manual cuff    Pulse 100    Temp 98.7 °F (37.1 °C) (Oral)    Resp 18    Ht 5' (1.524 m)    Wt 151 lb (68.5 kg)    LMP 01/16/2018    BMI 29.49 kg/m2     BP Readings from Last 3 Encounters:   01/22/18 (!) 145/101   01/17/18 (!) 143/104   05/09/17 (!) 141/97     Physical Exam   Constitutional: She is oriented to person, place, and time. She appears well-developed and well-nourished. No distress. Cardiovascular: Normal rate, regular rhythm and normal heart sounds. Exam reveals no gallop and no friction rub. No murmur heard. Pulmonary/Chest: Effort normal and breath sounds normal. No respiratory distress. She has no wheezes. She has no rales. Musculoskeletal: She exhibits no edema. Neurological: She is alert and oriented to person, place, and time. Skin: Skin is warm and dry. She is not diaphoretic. Nursing note and vitals reviewed. ASSESSMENT and PLAN    ICD-10-CM ICD-9-CM    1.  Essential hypertension I10 401.9 hydroCHLOROthiazide (HYDRODIURIL) 25 mg tablet        Modest improvement in blood pressure, but acceptable for surgery  Increase hydrochlorothiazide    Follow-up Disposition:  Return in about 6 weeks (around 3/5/2018) for blood pressure. Reviewed plan of care. Patient has provided input and agrees with goals.

## 2018-01-22 NOTE — MR AVS SNAPSHOT
[address] 
[phone] Patient: Julio Lombardi MRN: [id number] EKH:[id number] Visit Information Date & Time Provider Department Dept. Phone Encounter #  
 1/22/2018  2:30 PM Riya Lovelace [address] 34 [id number] Upcoming Health Maintenance Date Due  
 PAP AKA CERVICAL CYTOLOGY 8/29/2003 Influenza Age 5 to Adult 8/1/2017 DTaP/Tdap/Td series (2 - Td) 5/9/2027 Allergies as of 1/22/2018  Review Complete On: 1/22/2018 By: Riya Lovelace MD  
  
 Severity Noted Reaction Type Reactions Flagyl [Metronidazole]  01/28/2014    Diarrhea Current Immunizations  Reviewed on 5/9/2017 No immunizations on file. Not reviewed this visit You Were Diagnosed With   
  
 Codes Comments Essential hypertension     ICD-10-CM: I10 
ICD-9-CM: 401.9 Vitals BP Pulse Temp Resp Height(growth percentile) Weight(growth percentile) 144/90 100 98.7 °F (37.1 °C) (Oral) 18 5' (1.524 m) 151 lb (68.5 kg) LMP BMI OB Status Smoking Status 01/16/2018 29.49 kg/m2 Having regular periods Never Smoker Vitals History BMI and BSA Data Body Mass Index Body Surface Area  
 29.49 kg/m 2 1.7 m 2 Preferred Pharmacy [facility] [address] [phone] Your Updated Medication List  
  
   
This list is accurate as of: 1/22/18  3:23 PM.  Always use your most recent med list.  
  
  
  
  
 hydroCHLOROthiazide 25 mg tablet Commonly known as:  HYDRODIURIL  
TAKE 1 TAB BY MOUTH DAILY. Prescriptions Sent to Pharmacy Refills  
 hydroCHLOROthiazide (HYDRODIURIL) 25 mg tablet 0 Sig: TAKE 1 TAB BY MOUTH DAILY. Class: Normal  
 Pharmacy: [facility], [facility] Ph #: [phone] Introducing [facility]! Raquel Monteiro introduces [employer] patient portal. Now you can access parts of your medical record, email your doctor's office, and request medication refills online. 1. In your internet browser, go to [email or web address] 2. Click on the First Time User? Click Here link in the Sign In box. You will see the New Member Sign Up page. 3. Enter your [employer] Access Code exactly as it appears below. You will not need to use this code after youve completed the sign-up process. If you do not sign up before the expiration date, you must request a new code. · [employer] Access Code: [email or web address] Expires: 4/17/2018 11:02 AM 
 
4. Enter the last four digits of your Social Security Number (xxxx) and Date of Birth (mm/dd/yyyy) as indicated and click Submit. You will be taken to the next sign-up page. 5. Create a [employer] ID. This will be your [employer] login ID and cannot be changed, so think of one that is secure and easy to remember. 6. Create a [employer] password. You can change your password at any time. 7. Enter your Password Reset Question and Answer. This can be used at a later time if you forget your password. 8. Enter your e-mail address. You will receive e-mail notification when new information is available in [address] 9. Click Sign Up. You can now view and download portions of your medical record. 10. Click the Download Summary menu link to download a portable copy of your medical information. If you have questions, please visit the Frequently Asked Questions section of the [employer] website. Remember, [employer] is NOT to be used for urgent needs. For medical emergencies, dial 911. Now available from your iPhone and Android! Please provide this summary of care documentation to your next provider. Your primary care clinician is listed as Sanam Solares. If you have any questions after today's visit, please call [phone].

## 2018-01-23 LAB
ALBUMIN SERPL-MCNC: 4.5 G/DL (ref 3.5–5.5)
ALBUMIN/GLOB SERPL: 1.7 {RATIO} (ref 1.2–2.2)
ALP SERPL-CCNC: 62 IU/L (ref 39–117)
ALT SERPL-CCNC: 15 IU/L (ref 0–32)
AST SERPL-CCNC: 16 IU/L (ref 0–40)
BILIRUB SERPL-MCNC: 0.4 MG/DL (ref 0–1.2)
BUN SERPL-MCNC: 12 MG/DL (ref 6–20)
BUN/CREAT SERPL: 13 (ref 9–23)
CALCIUM SERPL-MCNC: 9.1 MG/DL (ref 8.7–10.2)
CHLORIDE SERPL-SCNC: 100 MMOL/L (ref 96–106)
CO2 SERPL-SCNC: 24 MMOL/L (ref 18–29)
CREAT SERPL-MCNC: 0.93 MG/DL (ref 0.57–1)
GLOBULIN SER CALC-MCNC: 2.7 G/DL (ref 1.5–4.5)
GLUCOSE SERPL-MCNC: 84 MG/DL (ref 65–99)
POTASSIUM SERPL-SCNC: 3.5 MMOL/L (ref 3.5–5.2)
PROT SERPL-MCNC: 7.2 G/DL (ref 6–8.5)
SODIUM SERPL-SCNC: 140 MMOL/L (ref 134–144)
TSH SERPL DL<=0.005 MIU/L-ACNC: 1.31 UIU/ML (ref 0.45–4.5)

## 2018-01-25 VITALS
SYSTOLIC BLOOD PRESSURE: 144 MMHG | DIASTOLIC BLOOD PRESSURE: 90 MMHG | HEIGHT: 60 IN | TEMPERATURE: 98.7 F | BODY MASS INDEX: 29.64 KG/M2 | WEIGHT: 151 LBS | HEART RATE: 100 BPM | RESPIRATION RATE: 18 BRPM

## 2018-01-25 RX ORDER — HYDROCHLOROTHIAZIDE 25 MG/1
TABLET ORAL
Qty: 90 TAB | Refills: 0
Start: 2018-01-25 | End: 2018-05-02 | Stop reason: SDUPTHER

## 2018-01-25 NOTE — PROGRESS NOTES
HISTORY OF PRESENT ILLNESS  Geovani Shelton is a 30 y.o. female. Hypertension   The history is provided by the patient. This is a chronic problem. The current episode started more than 1 week ago. The problem occurs constantly. The problem has not changed since onset. Pertinent negatives include no chest pain, no abdominal pain, no headaches and no shortness of breath. Nothing aggravates the symptoms. The symptoms are relieved by medications. Treatments tried: HCTZ. The treatment provided moderate relief. Review of Systems   Constitutional: Negative for weight loss. No weight gain   Eyes: Negative for blurred vision. Respiratory: Negative for shortness of breath. Cardiovascular: Negative for chest pain and leg swelling. Gastrointestinal: Negative for abdominal pain. Neurological: Negative for dizziness, sensory change, speech change, focal weakness and headaches. Visit Vitals    /90  Comment: manual cuff    Pulse 100    Temp 98.7 °F (37.1 °C) (Oral)    Resp 18    Ht 5' (1.524 m)    Wt 151 lb (68.5 kg)    LMP 01/16/2018    BMI 29.49 kg/m2     BP Readings from Last 3 Encounters:   01/24/18 (!) 145/101   01/22/18 144/90   01/17/18 (!) 143/104     Physical Exam   Constitutional: She is oriented to person, place, and time. She appears well-developed and well-nourished. No distress. Cardiovascular: Normal rate, regular rhythm and normal heart sounds. Exam reveals no gallop and no friction rub. No murmur heard. Pulmonary/Chest: Effort normal and breath sounds normal. No respiratory distress. She has no wheezes. She has no rales. Musculoskeletal: She exhibits no edema. Neurological: She is alert and oriented to person, place, and time. Skin: Skin is warm and dry. She is not diaphoretic. Nursing note and vitals reviewed. ASSESSMENT and PLAN    ICD-10-CM ICD-9-CM    1.  Essential hypertension I10 401.9 hydroCHLOROthiazide (HYDRODIURIL) 25 mg tablet        Increase hydrochlorothiazide  Acceptable for surgery    Follow-up Disposition:  Return in about 6 weeks (around 3/5/2018) for blood pressure. Reviewed plan of care. Patient has provided input and agrees with goals.

## 2018-03-05 ENCOUNTER — OFFICE VISIT (OUTPATIENT)
Dept: FAMILY MEDICINE CLINIC | Age: 38
End: 2018-03-05

## 2018-03-05 VITALS
DIASTOLIC BLOOD PRESSURE: 80 MMHG | HEIGHT: 60 IN | RESPIRATION RATE: 18 BRPM | WEIGHT: 151 LBS | HEART RATE: 90 BPM | BODY MASS INDEX: 29.64 KG/M2 | TEMPERATURE: 98.1 F | SYSTOLIC BLOOD PRESSURE: 130 MMHG

## 2018-03-05 DIAGNOSIS — I10 ESSENTIAL HYPERTENSION: Primary | ICD-10-CM

## 2018-03-05 NOTE — PROGRESS NOTES
Chief Complaint   Patient presents with    Hypertension     6 wk f/u     1. Have you been to the ER, urgent care clinic since your last visit? No  Hospitalized since your last visit? No     2. Have you seen or consulted any other health care providers outside of the [address] [place] [address] since your last visit? Include any pap smears or colon screening.  No

## 2018-03-05 NOTE — PROGRESS NOTES
HISTORY OF PRESENT ILLNESS  Munira Werner is a 30 y.o. female. Hypertension   The history is provided by the patient. This is a chronic problem. The current episode started more than 1 week ago. The problem occurs constantly. The problem has been gradually improving. Pertinent negatives include no chest pain, no abdominal pain, no headaches and no shortness of breath. Nothing aggravates the symptoms. The symptoms are relieved by medications. Treatments tried: HCTZ. The treatment provided significant relief. Review of Systems   Constitutional: Negative for weight loss. No weight gain   Eyes: Negative for blurred vision. Respiratory: Negative for shortness of breath. Cardiovascular: Positive for leg swelling. Negative for chest pain. Her LLE is swollen due to foot surgery in January   Gastrointestinal: Negative for abdominal pain. Neurological: Negative for dizziness, sensory change, speech change, focal weakness and headaches. Visit Vitals    /80    Pulse 90    Temp 98.1 °F (36.7 °C) (Oral)    Resp 18    Ht 5' (1.524 m)    Wt 151 lb (68.5 kg)    BMI 29.49 kg/m2     BP Readings from Last 3 Encounters:   03/05/18 130/80   01/25/18 144/90   01/22/18 144/90     Physical Exam   Constitutional: She is oriented to person, place, and time. She appears well-developed and well-nourished. No distress. Cardiovascular: Normal rate, regular rhythm and normal heart sounds. Exam reveals no gallop and no friction rub. No murmur heard. Pulmonary/Chest: Effort normal and breath sounds normal. No respiratory distress. She has no wheezes. She has no rales. Musculoskeletal: She exhibits edema. Trace LLE edema   Neurological: She is alert and oriented to person, place, and time. Skin: Skin is warm and dry. She is not diaphoretic. Nursing note and vitals reviewed. ASSESSMENT and PLAN    ICD-10-CM ICD-9-CM    1.  Essential hypertension W28 421.6 METABOLIC PANEL, BASIC Blood pressure controlled  Labs per orders. Continue current plans. Follow-up Disposition:  Return in about 6 months (around 9/5/2018) for blood pressure. Reviewed plan of care. Patient has provided input and agrees with goals.

## 2018-03-05 NOTE — MR AVS SNAPSHOT
[address] [facility] [address] [facility] 
[phone] Patient: Dyana Mitchell MRN: [id number] FNJ:[birth year] Visit Information Date & Time Provider Department Dept. Phone Encounter #  
 3/5/2018 11:30 AM Danyell Dewitt 34 [id number] Upcoming Health Maintenance Date Due  
 PAP AKA CERVICAL CYTOLOGY 1/15/2020 DTaP/Tdap/Td series (2 - Td) 5/9/2027 Allergies as of 3/5/2018  Review Complete On: 3/5/2018 By: Temo Martel MD  
  
 Severity Noted Reaction Type Reactions Flagyl [Metronidazole]  01/28/2014    Diarrhea Current Immunizations  Reviewed on 5/9/2017 No immunizations on file. Not reviewed this visit You Were Diagnosed With   
  
 Codes Comments Essential hypertension    -  Primary ICD-10-CM: I10 
ICD-9-CM: 401.9 Vitals BP Pulse Temp Resp Height(growth percentile) Weight(growth percentile) 130/80 90 98.1 °F (36.7 °C) (Oral) 18 5' (1.524 m) 151 lb (68.5 kg) BMI OB Status Smoking Status 29.49 kg/m2 Having regular periods Never Smoker Vitals History BMI and BSA Data Body Mass Index Body Surface Area  
 29.49 kg/m 2 1.7 m 2 Preferred Pharmacy [facility] [address] [phone] Your Updated Medication List  
  
   
This list is accurate as of 3/5/18 12:05 PM.  Always use your most recent med list.  
  
  
  
  
 hydroCHLOROthiazide 25 mg tablet Commonly known as:  HYDRODIURIL  
TAKE 1 TAB BY MOUTH DAILY. We Performed the Following METABOLIC PANEL, BASIC [43345 CPT(R)] Please provide this summary of care documentation to your next provider. Your primary care clinician is listed as Pro Yates. If you have any questions after today's visit, please call [phone].

## 2018-03-06 LAB
BUN SERPL-MCNC: 12 MG/DL (ref 6–20)
BUN/CREAT SERPL: 16 (ref 9–23)
CALCIUM SERPL-MCNC: 9.6 MG/DL (ref 8.7–10.2)
CHLORIDE SERPL-SCNC: 100 MMOL/L (ref 96–106)
CO2 SERPL-SCNC: 29 MMOL/L (ref 18–29)
CREAT SERPL-MCNC: 0.74 MG/DL (ref 0.57–1)
GFR SERPLBLD CREATININE-BSD FMLA CKD-EPI: 105 ML/MIN/1.73
GFR SERPLBLD CREATININE-BSD FMLA CKD-EPI: 121 ML/MIN/1.73
GLUCOSE SERPL-MCNC: 86 MG/DL (ref 65–99)
POTASSIUM SERPL-SCNC: 3.6 MMOL/L (ref 3.5–5.2)
SODIUM SERPL-SCNC: 139 MMOL/L (ref 134–144)

## 2018-04-29 DIAGNOSIS — I10 ESSENTIAL HYPERTENSION: ICD-10-CM

## 2018-05-02 RX ORDER — HYDROCHLOROTHIAZIDE 25 MG/1
TABLET ORAL
Qty: 90 TAB | Refills: 3 | Status: SHIPPED | OUTPATIENT
Start: 2018-05-02 | End: 2018-10-31 | Stop reason: SDUPTHER

## 2018-08-31 ENCOUNTER — HOSPITAL ENCOUNTER (OUTPATIENT)
Dept: PREADMISSION TESTING | Age: 38
Discharge: HOME | End: 2018-08-31
Payer: COMMERCIAL

## 2018-08-31 VITALS
DIASTOLIC BLOOD PRESSURE: 78 MMHG | SYSTOLIC BLOOD PRESSURE: 120 MMHG | TEMPERATURE: 98.3 F | BODY MASS INDEX: 29.95 KG/M2 | OXYGEN SATURATION: 100 % | RESPIRATION RATE: 17 BRPM | WEIGHT: 152.56 LBS | HEART RATE: 85 BPM | HEIGHT: 60 IN

## 2018-08-31 LAB
ABO + RH BLD: NORMAL
ANION GAP SERPL CALC-SCNC: 7 MMOL/L (ref 5–15)
BASOPHILS # BLD: 0 K/UL (ref 0–0.1)
BASOPHILS NFR BLD: 0 % (ref 0–1)
BLOOD GROUP ANTIBODIES SERPL: NORMAL
BUN SERPL-MCNC: 16 MG/DL (ref 6–20)
BUN/CREAT SERPL: 17 (ref 12–20)
CALCIUM SERPL-MCNC: 8.8 MG/DL (ref 8.5–10.1)
CHLORIDE SERPL-SCNC: 104 MMOL/L (ref 97–108)
CO2 SERPL-SCNC: 28 MMOL/L (ref 21–32)
CREAT SERPL-MCNC: 0.95 MG/DL (ref 0.55–1.02)
DIFFERENTIAL METHOD BLD: NORMAL
EOSINOPHIL # BLD: 0.1 K/UL (ref 0–0.4)
EOSINOPHIL NFR BLD: 1 % (ref 0–7)
ERYTHROCYTE [DISTWIDTH] IN BLOOD BY AUTOMATED COUNT: 14.2 % (ref 11.5–14.5)
GLUCOSE SERPL-MCNC: 87 MG/DL (ref 65–100)
HCG UR QL: NEGATIVE
HCT VFR BLD AUTO: 40.7 % (ref 35–47)
HGB BLD-MCNC: 13.1 G/DL (ref 11.5–16)
IMM GRANULOCYTES # BLD: 0 K/UL (ref 0–0.04)
IMM GRANULOCYTES NFR BLD AUTO: 0 % (ref 0–0.5)
LYMPHOCYTES # BLD: 1.2 K/UL (ref 0.8–3.5)
LYMPHOCYTES NFR BLD: 20 % (ref 12–49)
MCH RBC QN AUTO: 29.6 PG (ref 26–34)
MCHC RBC AUTO-ENTMCNC: 32.2 G/DL (ref 30–36.5)
MCV RBC AUTO: 91.9 FL (ref 80–99)
MONOCYTES # BLD: 0.3 K/UL (ref 0–1)
MONOCYTES NFR BLD: 6 % (ref 5–13)
NEUTS SEG # BLD: 4.3 K/UL (ref 1.8–8)
NEUTS SEG NFR BLD: 73 % (ref 32–75)
NRBC # BLD: 0 K/UL (ref 0–0.01)
NRBC BLD-RTO: 0 PER 100 WBC
PLATELET # BLD AUTO: 219 K/UL (ref 150–400)
PMV BLD AUTO: 11.8 FL (ref 8.9–12.9)
POTASSIUM SERPL-SCNC: 3.7 MMOL/L (ref 3.5–5.1)
RBC # BLD AUTO: 4.43 M/UL (ref 3.8–5.2)
SODIUM SERPL-SCNC: 139 MMOL/L (ref 136–145)
SPECIMEN EXP DATE BLD: NORMAL
WBC # BLD AUTO: 5.9 K/UL (ref 3.6–11)

## 2018-08-31 PROCEDURE — 81025 URINE PREGNANCY TEST: CPT | Performed by: OBSTETRICS & GYNECOLOGY

## 2018-08-31 PROCEDURE — 85025 COMPLETE CBC W/AUTO DIFF WBC: CPT | Performed by: OBSTETRICS & GYNECOLOGY

## 2018-08-31 PROCEDURE — 36415 COLL VENOUS BLD VENIPUNCTURE: CPT | Performed by: OBSTETRICS & GYNECOLOGY

## 2018-08-31 PROCEDURE — 86900 BLOOD TYPING SEROLOGIC ABO: CPT | Performed by: OBSTETRICS & GYNECOLOGY

## 2018-08-31 PROCEDURE — 80048 BASIC METABOLIC PNL TOTAL CA: CPT | Performed by: OBSTETRICS & GYNECOLOGY

## 2018-08-31 RX ORDER — BISMUTH SUBSALICYLATE 262 MG
1 TABLET,CHEWABLE ORAL DAILY
COMMUNITY

## 2018-08-31 NOTE — PERIOP NOTES
INSTRUCTIONS [address] MAIN OR [phone] MAIN PRE OP [phone] AMBULATORY PRE OP [phone] PRE-ADMISSION TESTING 21  Surgery Date:   Friday, 9/7/18 Is surgery arrival time given by surgeon? NO If [facility] staff will call you between 3 and 7pm the day before your surgery with your arrival time. (If your surgery is on a Monday, we will call you the Friday before.) Call [phone] after 7pm Monday-Friday if you did not receive your arrival time. Verification phone call on Thursday, 9/6/18. Answers to Common Questions When You 
Arrive Arrive at the [facility] [address] [facility] on the day of your surgery Have your insurance card, photo ID, and any copayment (if needed) Food 
 and  
Drink NO food or drink after midnight the night before surgery This means NO water, gum, mints, coffee, juice, etc. 
No alcohol (beer, wine, liquor) 24 hours before and after surgery Medicine to TAKE Morning of Surgery MEDICATIONS TO TAKE THE MORNING OF SURGERY WITH A SIP OF WATER:  
? None Medicine To 
STOP  
FOR PAIN 
? NO Aspirin for pain Stop Saturday, 9/1/18 until after surgery ? NO Non-Steroidal Anti-Inflammatory Drugs (NSAIDs:  
for example, Ibuprofen (Advil, Motrin), Naproxen (Aleve) STOP Saturday, 9/1/18 until after surgery ? You can take Tylenol  follow instructions on the bottle 
? STOP herbal supplements and vitamins 1 week before surgery STOP Saturday, 9/1/18 until after surgery Blood Thinners ? If you take Aspirin, Plavix, Coumadin, blood-thinning or anti-clot medicine, talk to your surgeon and/or follow the instructions from the doctor who told you to take that medicine Clothing Jewelry Valuables Bathing CLOTHING 
? Wear loose, comfortable clothes ? Wear glasses instead of contacts ? Leave money, jewelry and valuables at home ? No make-up, particularly mascara, the day of surgery ? REMOVE ALL piercings, rings, and jewelry - leave at home ? Wear hair loose or down; no pony-tails, buns, or metal hair clips BATHING 
? Follow all special bath instructions (for total joint replacement, spine and bowel surgeries.) ? If you shower the morning of surgery, please do not apply any lotions, powders, or deodorants afterwards. Do not shave or trim anywhere 24 hours before surgery. Going Home 
or Spending the Night  
? SAME-DAY SURGERY: You must have a responsible adult drive you home and stay with you 24 hours after surgery ? ADMITS: If your doctor is keeping you into the hospital after surgery, leave personal belongings/luggage in your car until you have a hospital room number. Hospital discharge time is 12 noon Drivers must be here before 12 noon unless you are told differently Follow all instructions so your surgery wont be cancelled. Please, be on time. If a situation occurs and you are delayed the day of surgery, call [phone] or [phone]. If your physical condition changes (like a fever, cold, flu, etc.) call your surgeon as soon as possible. The [facility] staff can be reached at [phone]. OTHER SPECIAL INSTRUCTIONS:  Free  parking 7:00 am to 5:00 pm.  Bring completed medication form on day of surgery. The patient was contacted  in person. She  verbalize  understanding of all instructions and does not  need reinforcement.

## 2018-09-01 LAB
BACTERIA SPEC CULT: NORMAL
BACTERIA SPEC CULT: NORMAL
SERVICE CMNT-IMP: NORMAL

## 2018-09-05 NOTE — H&P
Visit Type:  pre op Primary Provider:  Monica Armenta MD 
 
CC:  Pre Op exam. 
 
History of Present Illness: 
Here today for pre op exam, scheduled for  hysteroscopy, D&C, polypectomy with myosure, Novasure ablation 2018 for heavy abnormal uterine bleeding. She is still continuously bleeding. She has failed multiple medical options. Vital Signs 41Years Old Female Height:  61 inches Weight: 152.6 pounds BMI:      28.94 
BP:       112/70 Past Pregnancy History : 5 Para:     2 Aborta:  3 Term: 2, Premature: 0, Living Children: 2, Vaginal Deliveries: 2, C-Sections: 0, Elect. Ab: 2, Ectopics: 0 Gynecologic History Does patient have any problems with urine leakage? no 
Does patient have any other bladder problems? no 
History of abnormal pap: no 
Gardasil Injection History: Declined Pt currently sexually active: yes Current Contraception: Tubal Ligation. History of STD: yes STD Type: Trichomonas. The patient opts not to have HIV testing today. Flu Vaccination Status : Felipe Vanessa Allergies FLAGYL (Moderate) Medications Removed from Medication List 
 
AUGMENTIN 500-125 MG ORAL TABLET (AMOXICILLIN-POT CLAVULANATE) 1 tab PO two times per day for 7 days METROGEL-VAGINAL 0.75 % VAGINAL GEL (METRONIDAZOLE) 1 applicator per vagina at bedtime for 5 nights ESTRADIOL 1 MG ORAL TABLET (ESTRADIOL) Take 1 tablet po daily for 2 weeks ACIDOPHILUS PROBIOTIC CAPSULE (LACTOBACILLUS CAPS) * BORIC ACID SUPPOSITORY 600MG Insert 1 suppository vaginally nightly for 3 days after each menstrual cycle Medications (at conclusion of this visit) 2017 HYDROCHLOROTHIAZIDE 12.5 MG ORAL TABLET (HYDROCHLOROTHIAZIDE) Past Medical History: 
   Reviewed history from 2017 and no changes required: STD-Trichomonas  
      migraines- no aura MRSA vaginitis 2014 Recurrent BV 
      HTN - on HCTZ Past Surgical History: Reviewed history from 2018 and no changes required: 
      Laparoscopic BTL 2007 SVDx2 TABx2 SABx1 
      D&C Foot Surgery 2018 - Family History Summary:  
   Reviewed history Last on 2018 and no changes required:2018 Mother Lindsay Gusman.) - Has Family History of Hypertension - Entered On: 2015 Father Lindsay Gusman.) - Has Family History of Hyperlipidemia - Entered On: 2017 PGM - Has Family History of Colon Cancer - Entered On: 2017 General Comments - FH: 
Family history transferred to [facility] - DOREEN shelby Social History: 
   Reviewed history from 2017 and no changes required: 
       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 Risk Factors:  
 
Smoked Tobacco Use:  Never smoker Smokeless Tobacco Use:  Never Passive smoke exposure:  no 
Drug use:  no 
HIV high-risk behavior:  no 
Caffeine use:  2-3 drinks per day Alcohol use:  yes Type:  occ Drinks per day:  social 
Exercise:  yes Type of Exercise:  walking Seatbelt use:  100 % Sun Exposure:  occasionally Family History Risk Factors: 
   Family History of MI in females < 72years old:  no 
   Family History of MI in males < 54years old:  no 
 
Dietary Counseling: yes Past Pregnancy History :  5 Term Births:  2 Premature Births: 0 Living Children: 2 Para:   2 Prev : 0 Aborta:  3 Elect. Ab:  2 Spont. Ab:  1 Ectopics:  0 Pregnancy # 1 Delivery date:   11/15/2006 Delivery type:    Delivery location:   [facility] Infant Sex:  Male Comments:  Delivered by Dr. Quin Modi. Review of Systems See HPI Except as noted in the HPI, the review of systems is negative for General, Breast, , Resp, GI, Endo, MS, Psych and Heme. General Medical Physical Exam:  
 
General Appearance: well developed, well nourished, in no acute distress Head: Inspection:  normocephalic without obvious abnormalities Ears, Nose, Throat:  
     External:  normocephalic and atraumatic Neck:  
     Thyroid:  no nodules, masses, tenderness, or enlargement Respiratory:  
     Resp. effort:  no use of accessory muscles Auscultation:   no rales, rhonchi, or wheezes Cardiovascular: Auscultation:   normal S1 and  S2; no murmur, rub, or gallop Peripheral circ: no cyanosis, clubbing, or edema Gastrointestinal:  
     Abdomen:  soft and non-tender; no masses Skin: Inspection:  no rashes, suspicious lesions, or ulcerations Neurological:  
     Cranial N:  II - XII grossly intact Psychiatric: 
     Judgement:  intact Mood/affect:  no appearance of anxiety, depression, or agitation Page 1  of 1  for report of patient  Evelin Preciadohenri,  1980 Caroline Interiano : 1980 (37 years) MRN: [id number] Exam Date: 2018 Report finalized Gynecologic Ultrasound Indication Menorrhagia/Abnormal Uterine bleeding. Method T ransvaginal ultrasound examination, 2D, Color Doppler , [place] E8. View: Sufficient. Uterus Normal. Long 5.6 cm x ap 4.3 cm x tr 5.9 cm. Vol 74.6 cm³. Position: anteverted Malformations: none Myometrium: inhomogeneous Endometrium: could not be seen clearly . Endometrial thickness, total 3.6 mm. Cervix details: cystic lesions identified suggesting superficial Nabothian cysts. Cervical length 
29.3 mm. Fibroid(s) Intramural. Right lateral anterior wall. Size 15.1 mm x 15.1 mm x 15.9 mm. Mean 15.4 mm. Vol 1.910 cm³. Right Ovary Normal. Outline: smooth. Morphology: normal. Size 2.7 cm x 1.6 cm x 1.8 cm. Mean 2.1 cm. Vol 4.2 cm³. Doppler Color flow: present. Left Ovary Normal. Outline: smooth. Morphology: normal. Size 2.9 cm x 1.5 cm x 1.8 cm. Mean 2.0 cm. Vol 3.9 cm³. Doppler Color flow: present. Cul de Sac Normal. No free fluid visualized. Impression Normal appearing uterus with endometrial thickness of 3.6 mm, uterus containing a 15mm 
intramural fibroid. Normal appearing ovaries. Recommendation: Clinical correlation recommended. Impression & Recommendations: 
 
Problem # 1:  Abnormal uterine bleeding (ICD-626.9) (WLY67-W18.9) Discussed her AUB and endometrial biopsy results showing fragments of an endometrial polyp. She has not responded to multiple medical management options. Offered hysteroscopy, D&C, ablation and she wishes to proceed. Will also perform polypectomy. Surgery details, risks, benefits, and alternatives discussed and all questions were answered. Consents signed, after care instructions given. Orders: 
Pre-Op Exam (CPT-PO)

## 2018-09-06 ENCOUNTER — ANESTHESIA EVENT (OUTPATIENT)
Dept: SURGERY | Age: 38
End: 2018-09-06
Payer: COMMERCIAL

## 2018-09-06 NOTE — PERIOP NOTES
Patient with history of MRSA in 2014 and was treated with Bactroban ointment then. Negative MRSA at PAT appointment. No need for contact isolation for the day of surgery. DOS: 9/7/2018

## 2018-09-07 ENCOUNTER — HOSPITAL ENCOUNTER (OUTPATIENT)
Age: 38
Setting detail: OUTPATIENT SURGERY
Discharge: HOME | End: 2018-09-07
Attending: OBSTETRICS & GYNECOLOGY | Admitting: OBSTETRICS & GYNECOLOGY
Payer: COMMERCIAL

## 2018-09-07 ENCOUNTER — ANESTHESIA (OUTPATIENT)
Dept: SURGERY | Age: 38
End: 2018-09-07
Payer: COMMERCIAL

## 2018-09-07 VITALS
HEART RATE: 82 BPM | DIASTOLIC BLOOD PRESSURE: 70 MMHG | SYSTOLIC BLOOD PRESSURE: 123 MMHG | TEMPERATURE: 97.3 F | RESPIRATION RATE: 18 BRPM | OXYGEN SATURATION: 100 %

## 2018-09-07 DIAGNOSIS — Z98.890 S/P ENDOMETRIAL ABLATION: Primary | ICD-10-CM

## 2018-09-07 LAB — HCG UR QL: NEGATIVE

## 2018-09-07 PROCEDURE — 77030032490 HC SLV COMPR SCD KNE COVD -B: Performed by: OBSTETRICS & GYNECOLOGY

## 2018-09-07 PROCEDURE — 74011250636 HC RX REV CODE- 250/636

## 2018-09-07 PROCEDURE — 77030003666 HC NDL SPINAL BD -A: Performed by: OBSTETRICS & GYNECOLOGY

## 2018-09-07 PROCEDURE — 77030018836 HC SOL IRR NACL ICUM -A: Performed by: OBSTETRICS & GYNECOLOGY

## 2018-09-07 PROCEDURE — 77030020143 HC AIRWY LARYN INTUB CGAS -A: Performed by: ANESTHESIOLOGY

## 2018-09-07 PROCEDURE — 76010000138 HC OR TIME 0.5 TO 1 HR: Performed by: OBSTETRICS & GYNECOLOGY

## 2018-09-07 PROCEDURE — 76210000021 HC REC RM PH II 0.5 TO 1 HR: Performed by: OBSTETRICS & GYNECOLOGY

## 2018-09-07 PROCEDURE — 77030033136 HC TBNG INFLO AQUILEX ST HOLO -C: Performed by: OBSTETRICS & GYNECOLOGY

## 2018-09-07 PROCEDURE — 77030033650 HC DEV TISS RMVL MYOSUR HOLO -F: Performed by: OBSTETRICS & GYNECOLOGY

## 2018-09-07 PROCEDURE — 74011250636 HC RX REV CODE- 250/636: Performed by: ANESTHESIOLOGY

## 2018-09-07 PROCEDURE — 81025 URINE PREGNANCY TEST: CPT

## 2018-09-07 PROCEDURE — 77030020782 HC GWN BAIR PAWS FLX 3M -B

## 2018-09-07 PROCEDURE — 77030005537 HC CATH URETH BARD -A: Performed by: OBSTETRICS & GYNECOLOGY

## 2018-09-07 PROCEDURE — 74011250637 HC RX REV CODE- 250/637: Performed by: OBSTETRICS & GYNECOLOGY

## 2018-09-07 PROCEDURE — 76060000032 HC ANESTHESIA 0.5 TO 1 HR: Performed by: OBSTETRICS & GYNECOLOGY

## 2018-09-07 PROCEDURE — 76210000006 HC OR PH I REC 0.5 TO 1 HR: Performed by: OBSTETRICS & GYNECOLOGY

## 2018-09-07 PROCEDURE — 77030033137 HC TBNG OUTFLO AQUILEX ST HOLO -B: Performed by: OBSTETRICS & GYNECOLOGY

## 2018-09-07 PROCEDURE — 88305 TISSUE EXAM BY PATHOLOGIST: CPT | Performed by: OBSTETRICS & GYNECOLOGY

## 2018-09-07 RX ORDER — FENTANYL CITRATE 50 UG/ML
INJECTION, SOLUTION INTRAMUSCULAR; INTRAVENOUS AS NEEDED
Status: DISCONTINUED | OUTPATIENT
Start: 2018-09-07 | End: 2018-09-07 | Stop reason: HOSPADM

## 2018-09-07 RX ORDER — SODIUM CHLORIDE 0.9 % (FLUSH) 0.9 %
5-10 SYRINGE (ML) INJECTION AS NEEDED
Status: DISCONTINUED | OUTPATIENT
Start: 2018-09-07 | End: 2018-09-07 | Stop reason: HOSPADM

## 2018-09-07 RX ORDER — PROPOFOL 10 MG/ML
INJECTION, EMULSION INTRAVENOUS AS NEEDED
Status: DISCONTINUED | OUTPATIENT
Start: 2018-09-07 | End: 2018-09-07 | Stop reason: HOSPADM

## 2018-09-07 RX ORDER — SODIUM CHLORIDE 0.9 % (FLUSH) 0.9 %
5-10 SYRINGE (ML) INJECTION EVERY 8 HOURS
Status: DISCONTINUED | OUTPATIENT
Start: 2018-09-07 | End: 2018-09-07 | Stop reason: HOSPADM

## 2018-09-07 RX ORDER — NALOXONE HYDROCHLORIDE 0.4 MG/ML
0.2 INJECTION, SOLUTION INTRAMUSCULAR; INTRAVENOUS; SUBCUTANEOUS
Status: DISCONTINUED | OUTPATIENT
Start: 2018-09-07 | End: 2018-09-07 | Stop reason: HOSPADM

## 2018-09-07 RX ORDER — LIDOCAINE HYDROCHLORIDE 20 MG/ML
INJECTION, SOLUTION EPIDURAL; INFILTRATION; INTRACAUDAL; PERINEURAL AS NEEDED
Status: DISCONTINUED | OUTPATIENT
Start: 2018-09-07 | End: 2018-09-07 | Stop reason: HOSPADM

## 2018-09-07 RX ORDER — HYDROMORPHONE HYDROCHLORIDE 1 MG/ML
.25-1 INJECTION, SOLUTION INTRAMUSCULAR; INTRAVENOUS; SUBCUTANEOUS
Status: DISCONTINUED | OUTPATIENT
Start: 2018-09-07 | End: 2018-09-07 | Stop reason: HOSPADM

## 2018-09-07 RX ORDER — DEXAMETHASONE SODIUM PHOSPHATE 4 MG/ML
INJECTION, SOLUTION INTRA-ARTICULAR; INTRALESIONAL; INTRAMUSCULAR; INTRAVENOUS; SOFT TISSUE AS NEEDED
Status: DISCONTINUED | OUTPATIENT
Start: 2018-09-07 | End: 2018-09-07 | Stop reason: HOSPADM

## 2018-09-07 RX ORDER — DIPHENHYDRAMINE HYDROCHLORIDE 50 MG/ML
12.5 INJECTION, SOLUTION INTRAMUSCULAR; INTRAVENOUS AS NEEDED
Status: DISCONTINUED | OUTPATIENT
Start: 2018-09-07 | End: 2018-09-07 | Stop reason: HOSPADM

## 2018-09-07 RX ORDER — ONDANSETRON 2 MG/ML
INJECTION INTRAMUSCULAR; INTRAVENOUS AS NEEDED
Status: DISCONTINUED | OUTPATIENT
Start: 2018-09-07 | End: 2018-09-07 | Stop reason: HOSPADM

## 2018-09-07 RX ORDER — IBUPROFEN 600 MG/1
600 TABLET ORAL
Qty: 30 TAB | Refills: 2 | Status: SHIPPED | OUTPATIENT
Start: 2018-09-07 | End: 2020-01-21

## 2018-09-07 RX ORDER — SODIUM CHLORIDE, SODIUM LACTATE, POTASSIUM CHLORIDE, CALCIUM CHLORIDE 600; 310; 30; 20 MG/100ML; MG/100ML; MG/100ML; MG/100ML
125 INJECTION, SOLUTION INTRAVENOUS CONTINUOUS
Status: DISCONTINUED | OUTPATIENT
Start: 2018-09-07 | End: 2018-09-07 | Stop reason: HOSPADM

## 2018-09-07 RX ORDER — OXYCODONE AND ACETAMINOPHEN 5; 325 MG/1; MG/1
1 TABLET ORAL
Status: COMPLETED | OUTPATIENT
Start: 2018-09-07 | End: 2018-09-07

## 2018-09-07 RX ORDER — LIDOCAINE HYDROCHLORIDE 10 MG/ML
0.1 INJECTION, SOLUTION EPIDURAL; INFILTRATION; INTRACAUDAL; PERINEURAL AS NEEDED
Status: DISCONTINUED | OUTPATIENT
Start: 2018-09-07 | End: 2018-09-07 | Stop reason: HOSPADM

## 2018-09-07 RX ORDER — KETOROLAC TROMETHAMINE 30 MG/ML
INJECTION, SOLUTION INTRAMUSCULAR; INTRAVENOUS AS NEEDED
Status: DISCONTINUED | OUTPATIENT
Start: 2018-09-07 | End: 2018-09-07 | Stop reason: HOSPADM

## 2018-09-07 RX ORDER — OXYCODONE AND ACETAMINOPHEN 5; 325 MG/1; MG/1
1 TABLET ORAL
Qty: 8 TAB | Refills: 0 | Status: SHIPPED | OUTPATIENT
Start: 2018-09-07 | End: 2018-10-31

## 2018-09-07 RX ORDER — FLUMAZENIL 0.1 MG/ML
0.2 INJECTION INTRAVENOUS
Status: DISCONTINUED | OUTPATIENT
Start: 2018-09-07 | End: 2018-09-07 | Stop reason: HOSPADM

## 2018-09-07 RX ORDER — MIDAZOLAM HYDROCHLORIDE 1 MG/ML
INJECTION, SOLUTION INTRAMUSCULAR; INTRAVENOUS AS NEEDED
Status: DISCONTINUED | OUTPATIENT
Start: 2018-09-07 | End: 2018-09-07 | Stop reason: HOSPADM

## 2018-09-07 RX ADMIN — KETOROLAC TROMETHAMINE 30 MG: 30 INJECTION, SOLUTION INTRAMUSCULAR; INTRAVENOUS at 09:43

## 2018-09-07 RX ADMIN — MIDAZOLAM HYDROCHLORIDE 2 MG: 1 INJECTION, SOLUTION INTRAMUSCULAR; INTRAVENOUS at 08:56

## 2018-09-07 RX ADMIN — HYDROMORPHONE HYDROCHLORIDE 0.5 MG: 1 INJECTION, SOLUTION INTRAMUSCULAR; INTRAVENOUS; SUBCUTANEOUS at 10:15

## 2018-09-07 RX ADMIN — PROPOFOL 200 MG: 10 INJECTION, EMULSION INTRAVENOUS at 09:06

## 2018-09-07 RX ADMIN — FENTANYL CITRATE 50 MCG: 50 INJECTION, SOLUTION INTRAMUSCULAR; INTRAVENOUS at 09:23

## 2018-09-07 RX ADMIN — ONDANSETRON 4 MG: 2 INJECTION INTRAMUSCULAR; INTRAVENOUS at 09:25

## 2018-09-07 RX ADMIN — SODIUM CHLORIDE, SODIUM LACTATE, POTASSIUM CHLORIDE, AND CALCIUM CHLORIDE 125 ML/HR: 600; 310; 30; 20 INJECTION, SOLUTION INTRAVENOUS at 07:34

## 2018-09-07 RX ADMIN — LIDOCAINE HYDROCHLORIDE 80 MG: 20 INJECTION, SOLUTION EPIDURAL; INFILTRATION; INTRACAUDAL; PERINEURAL at 09:06

## 2018-09-07 RX ADMIN — DEXAMETHASONE SODIUM PHOSPHATE 4 MG: 4 INJECTION, SOLUTION INTRA-ARTICULAR; INTRALESIONAL; INTRAMUSCULAR; INTRAVENOUS; SOFT TISSUE at 09:25

## 2018-09-07 RX ADMIN — OXYCODONE HYDROCHLORIDE AND ACETAMINOPHEN 1 TABLET: 5; 325 TABLET ORAL at 11:24

## 2018-09-07 NOTE — DISCHARGE INSTRUCTIONS
After Your Endometrial Ablation      1. You may resume your usual diet once the nausea resolves. Initially, try sips of warm fluids and a bland diet. 2. Avoid heavy lifting and straining. Gradually increase your activity. First, try walking and doing light activity around the house. Resume your normal habits if no significant discomfort or bleeding develops. Most women can return to work within one to four days after this procedure. 3. You may take showers. Avoid using a tub bath, swimming pool or hot tub until after your check-up. 4. Do not place anything in your vagina until after your postoperative visit. Do not   douche, use tampons, or have intercourse because this may cause bleeding and   infection. 5. You may initially experience a heavy bloody discharge. This should not be more than your menstrual flow. The discharge may continue for several days or a few weeks. 6. Typically following the procedure, there is crampy, menstrual-type pain. You may feel cramps in your lower abdomen. Tylenol or Ibuprofen may relieve mild cramping. If pain medication does not improve your symptoms, you should contact your physician. 7. Contact the office if you have excessive bleeding (saturating a pad an hour for two hours or passing large clots). It is also necessary to speak with your physician if you develop chills, a temperature greater than 100.4, difficulty voiding or burning on urination. 8. Your physician may want to see you in the office after your Endometrial Ablation. Please call for an appointment if this has not already been arranged. Our office phone number is [phone]. If appropriate, the microscopic results from your procedure will be discussed at this follow-up visit.             DISCHARGE SUMMARY from your Nurse    The following personal items collected during your admission are returned to you:   Dental Appliance: Dental Appliances: None  Vision: Visual Aid: Glasses, With patient  Hearing Aid:    Jewelry: Jewelry: None  Clothing: Clothing: Undergarments, Pants, Shirt, Footwear, Socks (placed in locker)  Other Valuables: Other Valuables: Eyeglasses (given to family)  Valuables sent to safe:      PATIENT INSTRUCTIONS:    After general anesthesia or intravenous sedation, for 24 hours or while taking prescription Narcotics:  · Limit your activities  · Do not drive and operate hazardous machinery  · Do not make important personal or business decisions  · Do  not drink alcoholic beverages  · If you have not urinated within 8 hours after discharge, please contact your surgeon on call. Report the following to your surgeon:  · Excessive pain, swelling, redness or odor of or around the surgical area  · Temperature over 100.5  · Nausea and vomiting lasting longer than 4 hours or if unable to take medications  · Any signs of decreased circulation or nerve impairment to extremity: change in color, persistent  numbness, tingling, coldness or increase pain  · Any questions    COUGH AND DEEP BREATHE    Breathing deep and coughing are very important exercises to do after surgery. Deep breathing and coughing open the little air tubes and air sacks in your lungs. You take deep breaths every day. You may not even notice - it is just something you do when you sigh or yawn. It is a natural exercise you do to keep these air passages open. After surgery, take deep breaths and cough, on purpose. Coughing and deep breathing help prevent bronchitis and pneumonia after surgery. If you had chest or belly surgery, use a pillow as a \"hug buddy\" and hold it tightly to your chest or belly when you cough. DIRECTIONS:  6. Take 10 to 15 slow deep breaths every hour while awake. 7. Breathe in deeply, and hold it for 2 seconds. 8. Exhale slowly through puckered lips, like blowing up a balloon.   9. After every 4th or 5th deep breath, hug your pillow to your chest or belly and give a hard, deep cough.      Yes, it will probably hurt. But doing this exercise is very important part of healing after surgery. Take your pain medicine to help you do this exercise without too much pain. IF YOU HAVE BEEN DIAGNOSED WITH SLEEP APNEA, PLEASE USE YOUR SLEEP APNEA DEVICE OR CPAP MACHINE WHEN YOU INTEND TO NAP AFTER TAKING PAIN MEDICATION. Ankle Pumps    Ankle pumps increase the circulation of oxygenated blood to your lower extremities and decrease your risk for circulation problems such as blood clots. They also stretch the muscles, tendons and ligaments in your foot and ankle, and prevent joint contracture in the ankle and foot, especially after surgeries on the legs. It is important to do ankle pump exercises regularly after surgery because immobility increases your risk for developing a blood clot. Your doctor may also have you take an Aspirin for the next few days as well. If your doctor did not ask you to take an Aspirin, consult with him before starting Aspirin therapy on your own. Slowly point your foot forward, feeling the muscles on the top of your lower leg stretch, and hold this position for 5 seconds. Next, pull your foot back toward you as far as possible, stretching the calf muscles, and hold that position for 5 seconds. Repeat with the other foot. Perform 10 repetitions every hour while awake for both ankles if possible (down and then up with the foot once is one repetition). You should feel gentle stretching of the muscles in your lower leg when doing this exercise. If you feel pain, or your range of motion is limited, don't  Push too hard. Only go the limit your joint and muscles will let you go. If you have increasing pain, progressively worsening leg warmth or swelling, STOP the exercise and call your doctor.      Below is information about the medications your doctor is prescribing after your visit:    Other information in your discharge envelope:  []     PRESCRIPTIONS  []     PHYSICAL THERAPY PRESCRIPTION  []     APPOINTMENT CARDS  []     [facility] Anest[facility] Pamphlet for block or block with On-Q Catheter from Anesthesia Service  []     Medical device information sheets/pamphlets from their    []     School/work excuse note. []     /parent work excuse note. These are general instructions for a healthy lifestyle:    *  Please give a list of your current medications to your Primary Care Provider. *  Please update this list whenever your medications are discontinued, doses are      changed, or new medications (including over-the-counter products) are added. *  Please carry medication information at all times in case of emergency situations. About Smoking  No smoking / No tobacco products / Avoid exposure to second hand smoke    Surgeon General's Warning:  Quitting smoking now greatly reduces serious risk to your health. Obesity, smoking, and sedentary lifestyle greatly increases your risk for illness and disease. A healthy diet, regular physical exercise & weight monitoring are important for maintaining a healthy lifestyle. Congestive Heart Failure  You may be retaining fluid if you have a history of heart failure or if you experience any of the following symptoms:  Weight gain of 3 pounds or more overnight or 5 pounds in a week, increased swelling in our hands or feet or shortness of breath while lying flat in bed. Please call your doctor as soon as you notice any of these symptoms; do not wait until your next office visit. Recognize signs and symptoms of STROKE:  F - face looks uneven  A - arms unable to move or move even  S - speech slurred or non-existent  T - time-call 911 as soon as signs and symptoms begin-DO NOT go         Back to bed or wait to see if you get better-TIME IS BRAIN. Warning signs of HEART ATTACK  Call 911 if you have these symptoms    · Chest discomfort.   Most heart attacks involve discomfort in the center of the chest that lasts more than a few minutes, or that goes away and comes back. It can feel like uncomfortable pressure, squeezing, fullness, or pain. · Discomfort in other areas of the upper body. Symptoms can include pain or discomfort in one or both        Arms, the back, neck, jaw, or stomach. ·  Shortness of breath with or without chest discomfort. · Other signs may include breaking out in a cold sweat, nausea, or lightheadedness    Don't wait more than five minutes to call 911 - MINUTES MATTER! Fast action can save your life. Calling 911 is almost always the fastest way to get lifesaving treatment. Emergency Medical Services staff can begin treatment when they arrive - up to an hour sooner than if someone gets to the hospital by car. [facility] MEDICATION AND SIDE EFFECT GUIDE    The [facility] MEDICATION AND SIDE EFFECT GUIDE was provided to the PATIENT AND CARE PROVIDER.   Information provided includes instruction about drug purpose and common side effects for the following medications:    · Percocet  · Motrin

## 2018-09-07 NOTE — DISCHARGE SUMMARY
Gynecology Discharge Summary     Patient ID:  Gael Brennan  [id number]  41 y.o.  1980    Admit date: 9/7/2018    Discharge date: 9/7/2018     Admission Diagnoses:   AUB, endometrial polyp  Patient Active Problem List   Diagnosis Code    Hypothyroidism, adult E03.9    Radiculopathy of cervical region M54.12    Essential hypertension I10       Discharge Diagnoses:   AUB, endometrial polyp s/p hysteroscopy, myosure polypectomy, D&C, Novasure endometrial ablation  Patient Active Problem List   Diagnosis Code    Hypothyroidism, adult E03.9    Radiculopathy of cervical region M54.12    Essential hypertension I10       Procedures for this admission: Procedure(s): HYSTEROSCOPY, DILATATION AND CURETTAGE, POLYPECTOMY WITH MYOSURE, [place] 1753 Course:  Patient was admitted for a scheduled procedure as described above. After consent was confirmed, she was taken to the operating room and underwent an uncomplicated procedure. She did well post-operatively and was discharged home later in the same day. Disposition: home    Discharged Condition: good    Patient Instructions:   Current Discharge Medication List      START taking these medications    Details   oxyCODONE-acetaminophen (PERCOCET) 5-325 mg per tablet Take 1 Tab by mouth every six (6) hours as needed for Pain. Max Daily Amount: 4 Tabs. Qty: 8 Tab, Refills: 0    Associated Diagnoses: S/P endometrial ablation      ibuprofen (MOTRIN) 600 mg tablet Take 1 Tab by mouth every six (6) hours as needed for Pain. Qty: 30 Tab, Refills: 2         CONTINUE these medications which have NOT CHANGED    Details   multivitamin (ONE A DAY) tablet Take 1 Tab by mouth daily. Ultra-Women      hydroCHLOROthiazide (HYDRODIURIL) 25 mg tablet TAKE 1 TAB BY MOUTH DAILY.   Qty: 90 Tab, Refills: 3    Associated Diagnoses: Essential hypertension           Activity: Activity as tolerated and no driving for today  Diet: Regular Diet  Wound Care: Keep wound clean and dry, use a pad as needed for bleeding    Follow-up with Dr. Emy Peterson in 2-4 weeks.       Signed:  Lainey Lepe MD  9/7/2018  10:01 AM

## 2018-09-07 NOTE — ANESTHESIA POSTPROCEDURE EVALUATION
Post-Anesthesia Evaluation and Assessment Patient: Gwen Carney MRN: [id number]  SSN: [id number] YOB: 1980  Age: 41 y.o. Sex: female Cardiovascular Function/Vital Signs Visit Vitals  /89  Pulse 82  Temp 36.4 °C (97.5 °F)  Resp 16  SpO2 100% Patient is status post general anesthesia for Procedure(s): HYSTEROSCOPY, DILATATION AND CURETTAGE, POLYPECTOMY WITH MYOSURE, NOVASURE ABLATION. Nausea/Vomiting: None Postoperative hydration reviewed and adequate. Pain: 
Pain Scale 1: Numeric (0 - 10) (09/07/18 1032) Pain Intensity 1: 3 (09/07/18 1032) Managed Neurological Status:  
Neuro (WDL): Within Defined Limits (09/07/18 0955) At baseline Mental Status and Level of Consciousness: Arousable Pulmonary Status:  
O2 Device: Room air (09/07/18 4492) Adequate oxygenation and airway patent Complications related to anesthesia: None Post-anesthesia assessment completed. No concerns Signed By: Sha Argueta MD   
 September 7, 2018

## 2018-09-07 NOTE — PROGRESS NOTES
There has been no interval change from H&P. Patient is ready for surgery today. UPT negative. No pre-operative antibiotics indicated.   
 
Berkley Blake MD

## 2018-09-07 NOTE — IP AVS SNAPSHOT
[address] [facility] 
 
 
 [address] 
[phone] Patient: Scarlett Longoria MRN: [id number] UME:6/[id number] About your hospitalization You were admitted on:  September 7, 2018 You last received care in the:  [facility] PACU You were discharged on:  September 7, 2018 Why you were hospitalized Your primary diagnosis was:  Not on File Follow-up Information Follow up With Details Comments Contact Info Isaiah Hogue MD Follow up in 4 week(s) post-operative visit [address] [facility] [address] 
[phone] Adrianne Wong MD   [address] 
[phone] Your Scheduled Appointments Wednesday October 24, 2018 10:45 AM EDT ROUTINE CARE with Adrianne Wong MD  
[address] [facility]) [address] [facility] [address]  
[phone] Discharge Orders None A check job indicates which time of day the medication should be taken. My Medications START taking these medications Instructions Each Dose to Equal  
 Morning Noon Evening Bedtime  
 ibuprofen 600 mg tablet Commonly known as:  MOTRIN Your last dose was: Your next dose is: Take 1 Tab by mouth every six (6) hours as needed for Pain. 600 mg  
    
   
   
   
  
 oxyCODONE-acetaminophen 5-325 mg per tablet Commonly known as:  PERCOCET Your last dose was: Your next dose is: Take 1 Tab by mouth every six (6) hours as needed for Pain. Max Daily Amount: 4 Tabs. 1 Tab CONTINUE taking these medications Instructions Each Dose to Equal  
 Morning Noon Evening Bedtime  
 hydroCHLOROthiazide 25 mg tablet Commonly known as:  HYDRODIURIL Your last dose was: Your next dose is: TAKE 1 TAB BY MOUTH DAILY. multivitamin tablet Commonly known as:  ONE A DAY Your last dose was: Your next dose is: Take 1 Tab by mouth daily. [place] 1 Tab Where to Get Your Medications Information on where to get these meds will be given to you by the nurse or doctor. ! Ask your nurse or doctor about these medications  
  ibuprofen 600 mg tablet  
 oxyCODONE-acetaminophen 5-325 mg per tablet Opioid Education Prescription Opioids: What You Need to Know: 
 
Prescription opioids can be used to help relieve moderate-to-severe pain and are often prescribed following a surgery or injury, or for certain health conditions. These medications can be an important part of treatment but also come with serious risks. Opioids are strong pain medicines. Examples include hydrocodone, oxycodone, fentanyl, and morphine. Heroin is an example of an illegal opioid. It is important to work with your health care provider to make sure you are getting the safest, most effective care. WHAT ARE THE RISKS AND SIDE EFFECTS OF OPIOID USE? Prescription opioids carry serious risks of addiction and overdose, especially with prolonged use. An opioid overdose, often marked by slow breathing, can cause sudden death. The use of prescription opioids can have a number of side effects as well, even when taken as directed. · Tolerance-meaning you might need to take more of a medication for the same pain relief · Physical dependence-meaning you have symptoms of withdrawal when the medication is stopped. Withdrawal symptoms can include nausea, sweating, chills, diarrhea, stomach cramps, and muscle aches. Withdrawal can last up to several weeks, depending on which drug you took and how long you took it. · Increased sensitivity to pain · Constipation · Nausea, vomiting, and dry mouth · Sleepiness and dizziness · Confusion · Depression · Low levels of testosterone that can result in lower sex drive, energy, and strength · Itching and sweating RISKS ARE GREATER WITH:      
· History of drug misuse, substance use disorder, or overdose · Mental health conditions (such as depression or anxiety) · Sleep apnea · Older age (72 years or older) · Pregnancy Avoid alcohol while taking prescription opioids. Also, unless specifically advised by your health care provider, medications to avoid include: · Benzodiazepines (such as Xanax or Valium) · Muscle relaxants (such as Soma or Flexeril) · Hypnotics (such as Ambien or Lunesta) · Other prescription opioids KNOW YOUR OPTIONS Talk to your health care provider about ways to manage your pain that don't involve prescription opioids. Some of these options may actually work better and have fewer risks and side effects. Options may include: 
· Pain relievers such as acetaminophen, ibuprofen, and naproxen · Some medications that are also used for depression or seizures · Physical therapy and exercise · Counseling to help patients learn how to cope better with triggers of pain and stress. · Application of heat or cold compress · Massage therapy · Relaxation techniques Be Informed Make sure you know the name of your medication, how much and how often to take it, and its potential risks & side effects. IF YOU ARE PRESCRIBED OPIOIDS FOR PAIN: 
· Never take opioids in greater amounts or more often than prescribed. Remember the goal is not to be pain-free but to manage your pain at a tolerable level. · Follow up with your primary care provider to: · Work together to create a plan on how to manage your pain. · Talk about ways to help manage your pain that don't involve prescription opioids. · Talk about any and all concerns and side effects. · Help prevent misuse and abuse. · Never sell or share prescription opioids · Help prevent misuse and abuse. · Store prescription opioids in a secure place and out of reach of others (this may include visitors, children, friends, and family). · Safely dispose of unused/unwanted prescription opioids: Find your community drug take-back program or your pharmacy mail-back program, or flush them down the toilet, following guidance from the Food and Drug Administration (www.fda.gov/Drugs/ResourcesForYou). · Visit www.cdc.gov/drugoverdose to learn about the risks of opioid abuse and overdose. · If you believe you may be struggling with addiction, tell your health care provider and ask for guidance or call Netli at 0-743-465-LTWL. Discharge Instructions After Your Endometrial Ablation 1. You may resume your usual diet once the nausea resolves. Initially, try sips of warm fluids and a bland diet. 2. Avoid heavy lifting and straining. Gradually increase your activity. First, try walking and doing light activity around the house. Resume your normal habits if no significant discomfort or bleeding develops. Most women can return to work within one to four days after this procedure. 3. You may take showers. Avoid using a tub bath, swimming pool or hot tub until after your check-up. 4. Do not place anything in your vagina until after your postoperative visit. Do not  
douche, use tampons, or have intercourse because this may cause bleeding and  
infection. 5. You may initially experience a heavy bloody discharge. This should not be more than your menstrual flow. The discharge may continue for several days or a few weeks. 6. Typically following the procedure, there is crampy, menstrual-type pain. You may feel cramps in your lower abdomen. Tylenol or Ibuprofen may relieve mild cramping. If pain medication does not improve your symptoms, you should contact your physician. 7. Contact the office if you have excessive bleeding (saturating a pad an hour for two hours or passing large clots). It is also necessary to speak with your physician if you develop chills, a temperature greater than 100.4, difficulty voiding or burning on urination. 8. Your physician may want to see you in the office after your Endometrial Ablation. Please call for an appointment if this has not already been arranged. Our office phone number is [phone]. If appropriate, the microscopic results from your procedure will be discussed at this follow-up visit. DISCHARGE SUMMARY from your Nurse The following personal items collected during your admission are returned to you:  
Dental Appliance: Dental Appliances: None Vision: Visual Aid: Glasses, With patient Hearing Aid:   
Jewelry: Jewelry: None Clothing: Clothing: Undergarments, Pants, Shirt, Footwear, Socks (placed in locker) Other Valuables: Other Valuables: Eyeglasses (given to family) Valuables sent to safe:   
 
PATIENT INSTRUCTIONS: 
 
After general anesthesia or intravenous sedation, for 24 hours or while taking prescription Narcotics: · Limit your activities · Do not drive and operate hazardous machinery · Do not make important personal or business decisions · Do  not drink alcoholic beverages · If you have not urinated within 8 hours after discharge, please contact your surgeon on call. Report the following to your surgeon: 
· Excessive pain, swelling, redness or odor of or around the surgical area · Temperature over 100.5 · Nausea and vomiting lasting longer than 4 hours or if unable to take medications · Any signs of decreased circulation or nerve impairment to extremity: change in color, persistent  numbness, tingling, coldness or increase pain · Any questions [address] Breathing deep and coughing are very important exercises to do after surgery. Deep breathing and coughing open the little air tubes and air sacks in your lungs. You take deep breaths every day. You may not even notice - it is just something you do when you sigh or yawn. It is a natural exercise you do to keep these air passages open. After surgery, take deep breaths and cough, on purpose. Coughing and deep breathing help prevent bronchitis and pneumonia after surgery. If you had chest or belly surgery, use a pillow as a \"hug jeremy\" and hold it tightly to your chest or belly when you cough. DIRECTIONS: 
6. Take 10 to 15 slow deep breaths every hour while awake. 7. Breathe in deeply, and hold it for 2 seconds. 8. Exhale slowly through puckered lips, like blowing up a balloon. 9. After every 4th or 5th deep breath, hug your pillow to your chest or belly and give a hard, deep cough. Yes, it will probably hurt. But doing this exercise is very important part of healing after surgery. Take your pain medicine to help you do this exercise without too much pain. IF YOU HAVE BEEN DIAGNOSED WITH SLEEP APNEA, PLEASE USE YOUR SLEEP APNEA DEVICE OR CPAP MACHINE WHEN YOU INTEND TO NAP AFTER TAKING PAIN MEDICATION. Ankle Pumps Ankle pumps increase the circulation of oxygenated blood to your lower extremities and decrease your risk for circulation problems such as blood clots. They also stretch the muscles, tendons and ligaments in your foot and ankle, and prevent joint contracture in the ankle and foot, especially after surgeries on the legs. It is important to do ankle pump exercises regularly after surgery because immobility increases your risk for developing a blood clot. Your doctor may also have you take an Aspirin for the next few days as well. If your doctor did not ask you to take an Aspirin, consult with him before starting Aspirin therapy on your own.  
 
 
Slowly point your foot forward, feeling the muscles on the top of your lower leg stretch, and hold this position for 5 seconds. Next, pull your foot back toward you as far as possible, stretching the calf muscles, and hold that position for 5 seconds. Repeat with the other foot. Perform 10 repetitions every hour while awake for both ankles if possible (down and then up with the foot once is one repetition). You should feel gentle stretching of the muscles in your lower leg when doing this exercise. If you feel pain, or your range of motion is limited, don't  Push too hard. Only go the limit your joint and muscles will let you go. If you have increasing pain, progressively worsening leg warmth or swelling, STOP the exercise and call your doctor. Below is information about the medications your doctor is prescribing after your visit: 
 
Other information in your discharge envelope: 
[]     PRESCRIPTIONS []     PHYSICAL THERAPY PRESCRIPTION 
[]     APPOINTMENT CARDS []     Regional Anesthesia Pamphlet for block or block with On-Q Catheter from Anesthesia Service []     Medical device information sheets/pamphlets from their   
[]     School/work excuse note. []     /parent work excuse note. These are general instructions for a healthy lifestyle: *  Please give a list of your current medications to your Primary Care Provider. *  Please update this list whenever your medications are discontinued, doses are 
    changed, or new medications (including over-the-counter products) are added. *  Please carry medication information at all times in case of emergency situations. About Smoking No smoking / No tobacco products / Avoid exposure to second hand smoke Surgeon General's Warning:  Quitting smoking now greatly reduces serious risk to your health. Obesity, smoking, and sedentary lifestyle greatly increases your risk for illness and disease.   A healthy diet, regular physical exercise & weight monitoring are important for maintaining a healthy lifestyle. Congestive Heart Failure You may be retaining fluid if you have a history of heart failure or if you experience any of the following symptoms:  Weight gain of 3 pounds or more overnight or 5 pounds in a week, increased swelling in our hands or feet or shortness of breath while lying flat in bed. Please call your doctor as soon as you notice any of these symptoms; do not wait until your next office visit. Recognize signs and symptoms of STROKE: 
F - face looks uneven A - arms unable to move or move even S - speech slurred or non-existent T - time-call 911 as soon as signs and symptoms begin-DO NOT go Back to bed or wait to see if you get better-TIME IS BRAIN. Warning signs of HEART ATTACK Call 911 if you have these symptoms · Chest discomfort. Most heart attacks involve discomfort in the center of the chest that lasts more than a few minutes, or that goes away and comes back. It can feel like uncomfortable pressure, squeezing, fullness, or pain. · Discomfort in other areas of the upper body. Symptoms can include pain or discomfort in one or both Arms, the back, neck, jaw, or stomach. ·  Shortness of breath with or without chest discomfort. · Other signs may include breaking out in a cold sweat, nausea, or lightheadedness Don't wait more than five minutes to call 911 - MINUTES MATTER! Fast action can save your life. Calling 911 is almost always the fastest way to get lifesaving treatment. Emergency Medical Services staff can begin treatment when they arrive - up to an hour sooner than if someone gets to the hospital by car. ROBERT HULL MEDICATION AND SIDE EFFECT GUIDE The [facility] E[facility]ECT [facility]IDE was provided to the PATIENT AND CARE PROVIDER. Information provided includes instruction about drug purpose and common side effects for the following medications: 
 
· Percocet · Motrin [facility] Announcement We are excited to announce that we are making your provider's discharge notes available to you in [facility] [facility]. You will see these notes when they are completed and signed by the physician that discharged you from your recent hospital stay. If you have any questions or concerns about any information you see in [employer], please call the Health Information Department where you were seen or reach out to your Primary Care Provider for more information about your plan of care. Introducing Gt Tang As a [facility] [place] patient, I wanted to make you aware of our electronic visit tool called Gt Tang. Aster[employer] 24/7 allows you to connect within minutes with a medical provider 24 hours a day, seven days a week via a mobile device or tablet or logging into a secure website from your computer. You can access Gt Tang from anywhere in the [place]. A virtual visit might be right for you when you have a simple condition and feel like you just dont want to get out of bed, or cant get away from work for an appointment, when your regular [facility] provider is not available (evenings, weekends or holidays), or when youre out of town and need minor care. Electronic visits cost only $49 and if the Asterisk 24/7 provider determines a prescription is needed to treat your condition, one can be electronically transmitted to a nearby pharmacy*. Please take a moment to enroll today if you have not already done so. The enrollment process is free and takes just a few minutes. To enroll, please download the [email or web address] garett to your tablet or phone, or visit [email or web address] to enroll on your computer.    
And, as an [address] [place] [address] patient with a [employer] account, the results of your visits will be scanned into your electronic medical record and your primary care provider will be able to view the scanned results. We urge you to continue to see your regular Naseem Poole provider for your ongoing medical care. And while your primary care provider may not be the one available when you seek a [employer] Steps virtual visit, the peace of mind you get from getting a real diagnosis real time can be priceless. For more information on Fit Steps, view our Frequently Asked Questions (FAQs) at [email or web address]. Sincerely, 
 
Anamaria Arellano MD 
Chief Medical Officer [address] *:  certain medications cannot be prescribed via Fit Steps Providers Seen During Your Hospitalization Provider Specialty Primary office phone Hector Pearson MD Obstetrics & Gynecology [phone] Your Primary Care Physician (PCP) Primary Care Physician Office Phone Office Fax Arsebastian Nery [phone] You are allergic to the following Allergen Reactions Flagyl (Metronidazole) Diarrhea Recent Documentation OB Status Smoking Status Unknown Never Smoker Emergency Contacts Name Discharge Info Relation Home Work Mobile Jackson Mariano DISCHARGE CAREGIVER [3] Daughter [21] [phone] Erica Clark DISCHARGE CAREGIVER [3] Other Relative [6] [phone] Patient Belongings The following personal items are in your possession at time of discharge: 
  Dental Appliances: None  Visual Aid: Glasses, With patient   Hearing Aids/Status: Does not own  Home Medications: None   Jewelry: None  Clothing: Undergarments, Pants, Shirt, Footwear, Socks (placed in locker)    Other Valuables: Eyeglasses (given to family) Please provide this summary of care documentation to your next provider. Signatures-by signing, you are acknowledging that this After Visit Summary has been reviewed with you and you have received a copy.   
  
 
  
    
    
 Patient Signature: ____________________________________________________________ Date:  ____________________________________________________________  
  
Burlene Jayme Provider Signature:  ____________________________________________________________ Date:  ____________________________________________________________

## 2018-09-07 NOTE — OP NOTES
Operative Report    Patient: Lawyer Granger MRN: [id number]  SSN: [id number]    YOB: 1980  Age: 41 y.o. Sex: female       Date of Surgery: 9/7/2018     Preoperative Diagnosis: ABNORMAL UTERINE BLEEDING AND ENDOMETRIAL POLYP     Postoperative Diagnosis: ABNORMAL UTERINE BLEEDING AND ENDOMETRIAL POLYP     Surgeon(s) and Role:     * Monica Armenta MD - Primary    Anesthesia: General     Procedure: Procedure(s): HYSTEROSCOPY, DILATATION AND CURETTAGE, POLYPECTOMY WITH Hurshel Pack     Procedure in Detail:   Patient was placed on the operating table in the supine position. Time out was done to confirm the operating procedure, surgeon, patient and site. Once confirmed by the team, the procedure was started. Patient was placed under General anesthesia. She was prepped and draped in the usual fashion for vaginal surgery. The cervix was visualized with the aid of an operative vaginal speculum. The anterior lip of the cervix was grasped with a single-tooth tenaculum and the uterus was sounded to 7.5cm. The cervix was then dilated to 7mm using anitra dilators. Next, the hysteroscope was inserted into through the cervix and into the uterine cavity and the following findings were noted: two small endometrial polyps on the right and left aspects of the anterior cavity surface, otherwise normal appearing endometrial cavity and normal appearing bilateral tubal ostia. The two polyps were resected under direct visualization using the myosure lite tissue resector device. Next, curetting of the endometrial tissue was performed using the [facility] lite device with tissue sampling obtained of all 4 walls of the endometrial cavity. Next, the endometrial ablation Novasure device was opened. The cavity length was measured at 4.5cm and a width of 3.0cm. The Novasure ablation device was inserted through the cervix and into the endometrial cavity and the size parameters were set.  The cavity test passed on first attempt. The ablation was performed at a power of 74 nichole for 45 seconds. Instruments were then removed. Bleeding was minimal. The patient went to the recovery room in satisfactory condition. Pathology is pending at the time of this dictation. Estimated Blood Loss:  10cc    Hysteroscopic fluid deficit: +135cc normal saline    Tourniquet Time: * No tourniquets in log *      Implants: * No implants in log *            Specimens:   ID Type Source Tests Collected by Time Destination   1 : endometrial curetting and polyp Preservative Endometrial Charlee Valladares MD 9/7/2018 2808 Pathology           Drains: None                Complications: None    Counts: Sponge and needle counts were correct times two.     Signed By:  Henny Zamudio MD     September 7, 2018

## 2018-09-07 NOTE — ANESTHESIA PREPROCEDURE EVALUATION
Anesthetic History No history of anesthetic complications Review of Systems / Medical History Patient summary reviewed, nursing notes reviewed and pertinent labs reviewed Pulmonary Within defined limits Pertinent negatives: No recent URI Neuro/Psych Within defined limits Cardiovascular Hypertension Exercise tolerance: >4 METS 
  
GI/Hepatic/Renal 
Within defined limits Endo/Other Hypothyroidism (no thyroid medication now) Other Findings Physical Exam 
 
Airway Mallampati: II 
TM Distance: 4 - 6 cm Neck ROM: normal range of motion Mouth opening: Normal 
 
 Cardiovascular Regular rate and rhythm,  S1 and S2 normal,  no murmur, click, rub, or gallop Rhythm: regular Rate: normal 
 
 
 
 Dental 
No notable dental hx Pulmonary Breath sounds clear to auscultation Abdominal 
GI exam deferred Other Findings Anesthetic Plan ASA: 2 Anesthesia type: general 
 
 
 
 
Induction: Intravenous Anesthetic plan and risks discussed with: Patient

## 2018-10-31 ENCOUNTER — OFFICE VISIT (OUTPATIENT)
Dept: FAMILY MEDICINE CLINIC | Age: 38
End: 2018-10-31

## 2018-10-31 VITALS
HEIGHT: 60 IN | BODY MASS INDEX: 31.22 KG/M2 | RESPIRATION RATE: 18 BRPM | DIASTOLIC BLOOD PRESSURE: 84 MMHG | WEIGHT: 159 LBS | SYSTOLIC BLOOD PRESSURE: 117 MMHG | HEART RATE: 84 BPM | TEMPERATURE: 98.5 F

## 2018-10-31 DIAGNOSIS — I10 ESSENTIAL HYPERTENSION: Primary | ICD-10-CM

## 2018-10-31 RX ORDER — HYDROCHLOROTHIAZIDE 25 MG/1
TABLET ORAL
Qty: 90 TAB | Refills: 3 | Status: SHIPPED | OUTPATIENT
Start: 2018-10-31 | End: 2019-03-12 | Stop reason: SDUPTHER

## 2018-10-31 NOTE — PROGRESS NOTES
HISTORY OF PRESENT ILLNESS  Raquel Zimmerman is a 41 y.o. female. Hypertension   The history is provided by the patient. This is a chronic problem. The current episode started more than 1 week ago. The problem occurs constantly. The problem has been gradually improving. Pertinent negatives include no chest pain, no abdominal pain, no headaches and no shortness of breath. Nothing aggravates the symptoms. The symptoms are relieved by medications. Treatments tried: HCTZ. The treatment provided significant relief. Review of Systems   Constitutional: Negative for weight loss. Weight gain   Eyes: Negative for blurred vision. Respiratory: Negative for shortness of breath. Cardiovascular: Negative for chest pain and leg swelling. Gastrointestinal: Negative for abdominal pain. Neurological: Negative for dizziness, sensory change, speech change, focal weakness and headaches. Visit Vitals  /84   Pulse 84   Temp 98.5 °F (36.9 °C) (Oral)   Resp 18   Ht 5' (1.524 m)   Wt 159 lb (72.1 kg)   BMI 31.05 kg/m²     BP Readings from Last 3 Encounters:   10/31/18 117/84   09/07/18 123/70   08/31/18 120/78     Physical Exam   Constitutional: She is oriented to person, place, and time. She appears well-developed and well-nourished. No distress. Cardiovascular: Normal rate, regular rhythm and normal heart sounds. Exam reveals no gallop and no friction rub. No murmur heard. Pulmonary/Chest: Effort normal and breath sounds normal. No respiratory distress. She has no wheezes. She has no rales. Musculoskeletal: She exhibits no edema. Neurological: She is alert and oriented to person, place, and time. Skin: Skin is warm and dry. She is not diaphoretic. Nursing note and vitals reviewed. ASSESSMENT and PLAN    ICD-10-CM ICD-9-CM    1.  Essential hypertension O50 737.9 METABOLIC PANEL, BASIC      hydroCHLOROthiazide (HYDRODIURIL) 25 mg tablet        Blood pressure controlled  Labs per orders. Continue current plans. Follow-up Disposition:  Return in about 6 months (around 4/30/2019) for blood pressure. Reviewed plan of care. Patient has provided input and agrees with goals.

## 2018-10-31 NOTE — PROGRESS NOTES
Chief Complaint   Patient presents with    Hypertension     6 mo f/u     1. Have you been to the ER, urgent care clinic since your last visit? Hospitalized since your last visit? No     2. Have you seen or consulted any other health care providers outside of the [address] [place] [address] since your last visit? Include any pap smears or colon screening.  No

## 2019-03-09 DIAGNOSIS — I10 ESSENTIAL HYPERTENSION: ICD-10-CM

## 2019-03-12 RX ORDER — HYDROCHLOROTHIAZIDE 25 MG/1
TABLET ORAL
Qty: 90 TAB | Refills: 2 | Status: SHIPPED | OUTPATIENT
Start: 2019-03-12 | End: 2020-01-03

## 2019-04-21 ENCOUNTER — APPOINTMENT (OUTPATIENT)
Dept: CT IMAGING | Age: 39
End: 2019-04-21
Attending: EMERGENCY MEDICINE
Payer: COMMERCIAL

## 2019-04-21 ENCOUNTER — HOSPITAL ENCOUNTER (EMERGENCY)
Age: 39
Discharge: HOME | End: 2019-04-21
Attending: EMERGENCY MEDICINE
Payer: COMMERCIAL

## 2019-04-21 VITALS
DIASTOLIC BLOOD PRESSURE: 90 MMHG | WEIGHT: 160 LBS | BODY MASS INDEX: 31.41 KG/M2 | OXYGEN SATURATION: 100 % | TEMPERATURE: 98.5 F | HEART RATE: 81 BPM | HEIGHT: 60 IN | RESPIRATION RATE: 16 BRPM | SYSTOLIC BLOOD PRESSURE: 122 MMHG

## 2019-04-21 DIAGNOSIS — N73.9 PID (PELVIC INFLAMMATORY DISEASE): Primary | ICD-10-CM

## 2019-04-21 LAB
ALBUMIN SERPL-MCNC: 3.8 G/DL (ref 3.5–5)
ALBUMIN/GLOB SERPL: 1.1 {RATIO} (ref 1.1–2.2)
ALP SERPL-CCNC: 64 U/L (ref 45–117)
ALT SERPL-CCNC: 35 U/L (ref 12–78)
ANION GAP SERPL CALC-SCNC: 4 MMOL/L (ref 5–15)
APPEARANCE UR: ABNORMAL
AST SERPL-CCNC: 29 U/L (ref 15–37)
BACTERIA URNS QL MICRO: NEGATIVE /HPF
BASOPHILS # BLD: 0 K/UL (ref 0–0.1)
BASOPHILS NFR BLD: 0 % (ref 0–1)
BILIRUB SERPL-MCNC: 0.6 MG/DL (ref 0.2–1)
BILIRUB UR QL: NEGATIVE
BUN SERPL-MCNC: 10 MG/DL (ref 6–20)
BUN/CREAT SERPL: 11 (ref 12–20)
CALCIUM SERPL-MCNC: 8.5 MG/DL (ref 8.5–10.1)
CHLORIDE SERPL-SCNC: 104 MMOL/L (ref 97–108)
CLUE CELLS VAG QL WET PREP: NORMAL
CO2 SERPL-SCNC: 31 MMOL/L (ref 21–32)
COLOR UR: ABNORMAL
CREAT SERPL-MCNC: 0.87 MG/DL (ref 0.55–1.02)
DIFFERENTIAL METHOD BLD: NORMAL
EOSINOPHIL # BLD: 0.1 K/UL (ref 0–0.4)
EOSINOPHIL NFR BLD: 1 % (ref 0–7)
EPITH CASTS URNS QL MICRO: ABNORMAL /LPF
ERYTHROCYTE [DISTWIDTH] IN BLOOD BY AUTOMATED COUNT: 14 % (ref 11.5–14.5)
GLOBULIN SER CALC-MCNC: 3.5 G/DL (ref 2–4)
GLUCOSE SERPL-MCNC: 89 MG/DL (ref 65–100)
GLUCOSE UR STRIP.AUTO-MCNC: NEGATIVE MG/DL
HCG UR QL: NEGATIVE
HCT VFR BLD AUTO: 40.9 % (ref 35–47)
HGB BLD-MCNC: 13.4 G/DL (ref 11.5–16)
HGB UR QL STRIP: NEGATIVE
IMM GRANULOCYTES # BLD AUTO: 0 K/UL (ref 0–0.04)
IMM GRANULOCYTES NFR BLD AUTO: 0 % (ref 0–0.5)
KETONES UR QL STRIP.AUTO: NEGATIVE MG/DL
KOH PREP SPEC: NORMAL
LEUKOCYTE ESTERASE UR QL STRIP.AUTO: ABNORMAL
LYMPHOCYTES # BLD: 1.5 K/UL (ref 0.8–3.5)
LYMPHOCYTES NFR BLD: 22 % (ref 12–49)
MCH RBC QN AUTO: 31 PG (ref 26–34)
MCHC RBC AUTO-ENTMCNC: 32.8 G/DL (ref 30–36.5)
MCV RBC AUTO: 94.7 FL (ref 80–99)
MONOCYTES # BLD: 0.4 K/UL (ref 0–1)
MONOCYTES NFR BLD: 5 % (ref 5–13)
NEUTS SEG # BLD: 5.1 K/UL (ref 1.8–8)
NEUTS SEG NFR BLD: 72 % (ref 32–75)
NITRITE UR QL STRIP.AUTO: NEGATIVE
NRBC # BLD: 0 K/UL (ref 0–0.01)
NRBC BLD-RTO: 0 PER 100 WBC
PH UR STRIP: 8.5 [PH] (ref 5–8)
PLATELET # BLD AUTO: 207 K/UL (ref 150–400)
PMV BLD AUTO: 11.7 FL (ref 8.9–12.9)
POTASSIUM SERPL-SCNC: 3.5 MMOL/L (ref 3.5–5.1)
PROT SERPL-MCNC: 7.3 G/DL (ref 6.4–8.2)
PROT UR STRIP-MCNC: 30 MG/DL
RBC # BLD AUTO: 4.32 M/UL (ref 3.8–5.2)
RBC #/AREA URNS HPF: ABNORMAL /HPF (ref 0–5)
SERVICE CMNT-IMP: NORMAL
SODIUM SERPL-SCNC: 139 MMOL/L (ref 136–145)
SP GR UR REFRACTOMETRY: 1.02 (ref 1–1.03)
T VAGINALIS VAG QL WET PREP: NORMAL
UROBILINOGEN UR QL STRIP.AUTO: 1 EU/DL (ref 0.2–1)
WBC # BLD AUTO: 7.1 K/UL (ref 3.6–11)
WBC URNS QL MICRO: ABNORMAL /HPF (ref 0–4)

## 2019-04-21 PROCEDURE — 87210 SMEAR WET MOUNT SALINE/INK: CPT

## 2019-04-21 PROCEDURE — 87491 CHLMYD TRACH DNA AMP PROBE: CPT

## 2019-04-21 PROCEDURE — 74011250636 HC RX REV CODE- 250/636: Performed by: EMERGENCY MEDICINE

## 2019-04-21 PROCEDURE — 80053 COMPREHEN METABOLIC PANEL: CPT

## 2019-04-21 PROCEDURE — 85025 COMPLETE CBC W/AUTO DIFF WBC: CPT

## 2019-04-21 PROCEDURE — 74011636320 HC RX REV CODE- 636/320: Performed by: EMERGENCY MEDICINE

## 2019-04-21 PROCEDURE — 74177 CT ABD & PELVIS W/CONTRAST: CPT

## 2019-04-21 PROCEDURE — 96372 THER/PROPH/DIAG INJ SC/IM: CPT

## 2019-04-21 PROCEDURE — 36415 COLL VENOUS BLD VENIPUNCTURE: CPT

## 2019-04-21 PROCEDURE — 99284 EMERGENCY DEPT VISIT MOD MDM: CPT

## 2019-04-21 PROCEDURE — 81001 URINALYSIS AUTO W/SCOPE: CPT

## 2019-04-21 PROCEDURE — 81025 URINE PREGNANCY TEST: CPT

## 2019-04-21 RX ORDER — CEFTRIAXONE 250 MG/8ML
250 INJECTION, POWDER, FOR SOLUTION INTRAMUSCULAR; INTRAVENOUS
Status: DISCONTINUED | OUTPATIENT
Start: 2019-04-21 | End: 2019-04-21

## 2019-04-21 RX ORDER — DOXYCYCLINE HYCLATE 100 MG
100 TABLET ORAL 2 TIMES DAILY
Qty: 28 TAB | Refills: 0 | Status: SHIPPED | OUTPATIENT
Start: 2019-04-21 | End: 2019-05-05

## 2019-04-21 RX ORDER — OXYCODONE AND ACETAMINOPHEN 5; 325 MG/1; MG/1
1 TABLET ORAL
Status: DISCONTINUED | OUTPATIENT
Start: 2019-04-21 | End: 2019-04-21 | Stop reason: CLARIF

## 2019-04-21 RX ADMIN — LIDOCAINE HYDROCHLORIDE 250 MG: 10 INJECTION, SOLUTION EPIDURAL; INFILTRATION; INTRACAUDAL; PERINEURAL at 12:50

## 2019-04-21 RX ADMIN — IOPAMIDOL 100 ML: 755 INJECTION, SOLUTION INTRAVENOUS at 11:34

## 2019-04-21 NOTE — DISCHARGE INSTRUCTIONS
Patient Education        Pelvic Inflammatory Disease: Care Instructions  Your Care Instructions    Pelvic inflammatory disease, or PID, is an infection of a woman's fallopian tubes and other reproductive organs. PID is usually caused by a sexually transmitted infection (STI), such as gonorrhea or chlamydia. PID can cause scars in the fallopian tubes, making it hard for a woman to get pregnant. Having one STI increases your risk for other STIs, such as genital herpes, genital warts, syphilis, and HIV. It is important to take all the medicine that was prescribed. PID can cause serious health problems if you do not complete your treatment. Follow-up care is a key part of your treatment and safety. Be sure to make and go to all appointments, and call your doctor if you are having problems. It's also a good idea to know your test results and keep a list of the medicines you take. How can you care for yourself at home? · Take your antibiotics as directed. Do not stop taking them just because you feel better. You need to take the full course of antibiotics. · Rest until your fever and pain have improved. · Take pain medicines exactly as directed. ? If the doctor gave you a prescription medicine for pain, take it as prescribed. ? If you are not taking a prescription pain medicine, ask your doctor if you can take an over-the-counter medicine. · Use a hot water bottle or a heating pad (set on low) on your belly for pain. · Do not douche. · Do not have sex or use tampons (you can use pads instead) until you have taken all the medicine, your pain is gone, and you feel completely well. · Talk to any sex partners you have had in the past 2 months. They need to be tested and may need to be treated for STIs. To prevent STIs  · Use latex condoms every time you have sex. Use them from the beginning to the end of sexual contact. · Talk to your partner before you have sex.  Find out if he or she has or is at risk for any sexually transmitted infection (STI). Keep in mind that a person may be able to spread an STI even if he or she does not have symptoms. · Do not have sex with anyone who has symptoms of an STI, such as sores on the genitals or mouth. · Having one sex partner (who does not have STIs and does not have sex with anyone else) is a good way to avoid STIs. When should you call for help? Call your doctor now or seek immediate medical care if:    · You have a new or higher fever.     · You have unusual vaginal bleeding.     · You have new or worse belly or pelvic pain.     · You have vaginal discharge that has increased in amount or smells bad.    Watch closely for changes in your health, and be sure to contact your doctor if:    · You do not get better as expected. Where can you learn more? Go to http://vera-douglas.info/. Enter N294 in the search box to learn more about \"Pelvic Inflammatory Disease: Care Instructions. \"  Current as of: May 14, 2018  Content Version: 11.9  © 2376-7939 [employer]. Care instructions adapted under license by [employer] (which disclaims liability or warranty for this information). If you have questions about a medical condition or this instruction, always ask your healthcare professional. [address] any warranty or liability for your use of this information.

## 2019-04-21 NOTE — ED PROVIDER NOTES
Pt. Presents to the ER with complaints of lower abdominal pain. Pt's sx started two weeks ago. Pt.'s sx has been present most days since. Currently, her pain is worse in her LLQ. Sometimes its in the RLQ. Pt's pain radiates down to her inner thigh (the leg on the side of the pain that day is what hurts, but it alternates). +nausea. No vomiting. NO changes with her urine or bowel movements. Pt. Does report a mild, clear vaginal discharge. Pt. Was seen at an Urgent Care and was diagnosed with kidney stones. Pt. Jessa Pih to see Urology who said that she does not have kidney stones. Pt. Denies having had similar sx prior to these last two weeks. Past Medical History:  
Diagnosis Date  Essential hypertension 5/9/2017 Takes HCTZ daily  Hypothyroidism, adult 9/11/2015  Radiculopathy of cervical region 9/11/2015 Past Surgical History:  
Procedure Laterality Date  HX GYN  2007 D&C, tubal ligation  HX ORTHOPAEDIC Left 01/2018 Foot surgery / Callus removed Family History:  
Problem Relation Age of Onset  Hypertension Mother  Other Father Hypercholesterol Social History Socioeconomic History  Marital status:  Spouse name: Not on file  Number of children: Not on file  Years of education: Not on file  Highest education level: Not on file Occupational History  Not on file Social Needs  Financial resource strain: Not on file  Food insecurity:  
  Worry: Not on file Inability: Not on file  Transportation needs:  
  Medical: Not on file Non-medical: Not on file Tobacco Use  Smoking status: Never Smoker  Smokeless tobacco: Never Used Substance and Sexual Activity  Alcohol use: Yes Comment: occasionally  Drug use: No  
 Sexual activity: Not on file Lifestyle  Physical activity:  
  Days per week: Not on file Minutes per session: Not on file  Stress: Not on file Relationships  Social connections:  
  Talks on phone: Not on file Gets together: Not on file Attends Faith service: Not on file Active member of club or organization: Not on file Attends meetings of clubs or organizations: Not on file Relationship status: Not on file  Intimate partner violence:  
  Fear of current or ex partner: Not on file Emotionally abused: Not on file Physically abused: Not on file Forced sexual activity: Not on file Other Topics Concern  Not on file Social History Narrative  Not on file ALLERGIES: Flagyl [metronidazole] Review of Systems Constitutional: Negative for chills and fever. HENT: Negative for rhinorrhea and sore throat. Respiratory: Negative for cough and shortness of breath. Cardiovascular: Negative for chest pain. Gastrointestinal: Positive for abdominal pain and nausea. Negative for diarrhea and vomiting. Genitourinary: Positive for vaginal discharge. Negative for dysuria and urgency. Musculoskeletal: Negative for arthralgias and back pain. Skin: Negative for rash. Neurological: Negative for dizziness, weakness and light-headedness. There were no vitals filed for this visit. Physical Exam  
 
Vital signs reviewed. Nursing notes reviewed. Const:  No acute distress, well developed, well nourished Head:  Atraumatic, normocephalic Eyes:  PERRL, conjunctiva normal, no scleral icterus Neck:  Supple, trachea midline Cardiovascular:  RRR, no murmurs, no gallops, no rubs Resp:  No resp distress, no increased work of breathing, no wheezes, no rhonchi, no rales, Abd:  Soft, LLQ and suprapubic tenderness, non-distended, no rebound, no guarding, no CVA tenderness :  Normal external genitalia, moderate yellow/white discharge, +CMT, no adnexal tenderness or masses MSK:  No pedal edema, normal ROM Neuro:  Alert and oriented x3, no cranial nerve defect Skin:  Warm, dry, intact Psych: normal mood and affect, behavior is normal, judgement and thought content is normal 
 
 
 
 
MDM Number of Diagnoses or Management Options PID (pelvic inflammatory disease): Amount and/or Complexity of Data Reviewed Clinical lab tests: ordered and reviewed Tests in the radiology section of CPT®: ordered and reviewed Review and summarize past medical records: yes Patient Progress Patient progress: stable Pt. Presents to the ER with lower abdominal pain. No acute findings on CT. Exam is concerning for PID. Pt. Is well appearing in the ER. I will start her on doxy. Pt. To f/u with her PCP or return to the ER with worsening sx. Procedures

## 2019-04-21 NOTE — ED TRIAGE NOTES
\"On the 8th I went to [facility] and they said I had kidney stones. I went to urology and they said they didn't see anything. I have pain on the left side (abdomen) and it moves to the right side, it can be either/or. \"

## 2019-04-22 LAB
C TRACH DNA SPEC QL NAA+PROBE: NEGATIVE
N GONORRHOEA DNA SPEC QL NAA+PROBE: NEGATIVE
SAMPLE TYPE: NORMAL
SERVICE CMNT-IMP: NORMAL
SPECIMEN SOURCE: NORMAL

## 2019-09-30 ENCOUNTER — HOSPITAL ENCOUNTER (EMERGENCY)
Age: 39
Discharge: HOME | End: 2019-09-30
Attending: EMERGENCY MEDICINE
Payer: COMMERCIAL

## 2019-09-30 VITALS
DIASTOLIC BLOOD PRESSURE: 88 MMHG | RESPIRATION RATE: 18 BRPM | OXYGEN SATURATION: 100 % | WEIGHT: 152 LBS | HEART RATE: 83 BPM | BODY MASS INDEX: 29.84 KG/M2 | SYSTOLIC BLOOD PRESSURE: 149 MMHG | HEIGHT: 60 IN | TEMPERATURE: 98.3 F

## 2019-09-30 DIAGNOSIS — R05.9 COUGH: ICD-10-CM

## 2019-09-30 DIAGNOSIS — R51.9 NONINTRACTABLE HEADACHE, UNSPECIFIED CHRONICITY PATTERN, UNSPECIFIED HEADACHE TYPE: Primary | ICD-10-CM

## 2019-09-30 DIAGNOSIS — J01.90 ACUTE SINUSITIS, RECURRENCE NOT SPECIFIED, UNSPECIFIED LOCATION: ICD-10-CM

## 2019-09-30 PROCEDURE — 99282 EMERGENCY DEPT VISIT SF MDM: CPT

## 2019-09-30 PROCEDURE — 74011250636 HC RX REV CODE- 250/636: Performed by: NURSE PRACTITIONER

## 2019-09-30 RX ORDER — AZITHROMYCIN 250 MG/1
TABLET, FILM COATED ORAL
Qty: 6 TAB | Refills: 0 | Status: SHIPPED | OUTPATIENT
Start: 2019-09-30 | End: 2019-10-05

## 2019-09-30 RX ADMIN — SODIUM CHLORIDE 1000 ML: 900 INJECTION, SOLUTION INTRAVENOUS at 10:39

## 2019-09-30 NOTE — ED TRIAGE NOTES
Pt reports a right sided headache onset 4 days ago accompanied by cough, sneezing, congestion, and diarrhea. Has taken ibuprofen, cough syrup, and OTC sinus medication without relief. Also reports elevated BPs in the 318'K systolic. Hx of HTN and takes HCTZ.

## 2019-09-30 NOTE — DISCHARGE INSTRUCTIONS
Patient Education        Cough: Care Instructions  Your Care Instructions    A cough is your body's response to something that bothers your throat or airways. Many things can cause a cough. You might cough because of a cold or the flu, bronchitis, or asthma. Smoking, postnasal drip, allergies, and stomach acid that backs up into your throat also can cause coughs. A cough is a symptom, not a disease. Most coughs stop when the cause, such as a cold, goes away. You can take a few steps at home to cough less and feel better. Follow-up care is a key part of your treatment and safety. Be sure to make and go to all appointments, and call your doctor if you are having problems. It's also a good idea to know your test results and keep a list of the medicines you take. How can you care for yourself at home? · Drink lots of water and other fluids. This helps thin the mucus and soothes a dry or sore throat. Honey or lemon juice in hot water or tea may ease a dry cough. · Take cough medicine as directed by your doctor. · Prop up your head on pillows to help you breathe and ease a dry cough. · Try cough drops to soothe a dry or sore throat. Cough drops don't stop a cough. Medicine-flavored cough drops are no better than candy-flavored drops or hard candy. · Do not smoke. Avoid secondhand smoke. If you need help quitting, talk to your doctor about stop-smoking programs and medicines. These can increase your chances of quitting for good. When should you call for help? Call 911 anytime you think you may need emergency care.  For example, call if:    · You have severe trouble breathing.    Call your doctor now or seek immediate medical care if:    · You cough up blood.     · You have new or worse trouble breathing.     · You have a new or higher fever.     · You have a new rash.    Watch closely for changes in your health, and be sure to contact your doctor if:    · You cough more deeply or more often, especially if you notice more mucus or a change in the color of your mucus.     · You have new symptoms, such as a sore throat, an earache, or sinus pain.     · You do not get better as expected. Where can you learn more? Go to http://vera-douglas.info/. Enter D279 in the search box to learn more about \"Cough: Care Instructions. \"  Current as of: June 9, 2019  Content Version: 12.2  © 7520-6358 [employer]. Care instructions adapted under license by [employer] (which disclaims liability or warranty for this information). If you have questions about a medical condition or this instruction, always ask your healthcare professional. [place] [address] any warranty or liability for your use of this information. Patient Education        Sinusitis: Care Instructions  Your Care Instructions    Sinusitis is an infection of the lining of the sinus cavities in your head. Sinusitis often follows a cold. It causes pain and pressure in your head and face. In most cases, sinusitis gets better on its own in 1 to 2 weeks. But some mild symptoms may last for several weeks. Sometimes antibiotics are needed. Follow-up care is a key part of your treatment and safety. Be sure to make and go to all appointments, and call your doctor if you are having problems. It's also a good idea to know your test results and keep a list of the medicines you take. How can you care for yourself at home? · Take an over-the-counter pain medicine, such as acetaminophen (Tylenol), ibuprofen (Advil, Motrin), or naproxen (Aleve). Read and follow all instructions on the label. · If the doctor prescribed antibiotics, take them as directed. Do not stop taking them just because you feel better. You need to take the full course of antibiotics. · Be careful when taking over-the-counter cold or flu medicines and Tylenol at the same time. Many of these medicines have acetaminophen, which is Tylenol.  Read the labels to make sure that you are not taking more than the recommended dose. Too much acetaminophen (Tylenol) can be harmful. · Breathe warm, moist air from a steamy shower, a hot bath, or a sink filled with hot water. Avoid cold, dry air. Using a humidifier in your home may help. Follow the directions for cleaning the machine. · Use saline (saltwater) nasal washes to help keep your nasal passages open and wash out mucus and bacteria. You can buy saline nose drops at a grocery store or drugstore. Or you can make your own at home by adding 1 teaspoon of salt and 1 teaspoon of baking soda to 2 cups of distilled water. If you make your own, fill a bulb syringe with the solution, insert the tip into your nostril, and squeeze gently. [place] your nose. · Put a hot, wet towel or a warm gel pack on your face 3 or 4 times a day for 5 to 10 minutes each time. · Try a decongestant nasal spray like oxymetazoline (Afrin). Do not use it for more than 3 days in a row. Using it for more than 3 days can make your congestion worse. When should you call for help? Call your doctor now or seek immediate medical care if:    · You have new or worse swelling or redness in your face or around your eyes.     · You have a new or higher fever.    Watch closely for changes in your health, and be sure to contact your doctor if:    · You have new or worse facial pain.     · The mucus from your nose becomes thicker (like pus) or has new blood in it.     · You are not getting better as expected. Where can you learn more? Go to http://vera-douglas.info/. Enter T075 in the search box to learn more about \"Sinusitis: Care Instructions. \"  Current as of: October 21, 2018  Content Version: 12.2  © 6823-6346 [employer]. Care instructions adapted under license by [employer] (which disclaims liability or warranty for this information).  If you have questions about a medical condition or this instruction, always ask your healthcare professional. [place] [address] any warranty or liability for your use of this information.

## 2019-09-30 NOTE — ED NOTES
SUZAN Young has reviewed discharge instructions with patient and self including prescription(s) if applicable. Patient has received and verbalizes understanding of all instructions and has no further questions at this time. Patient exits ED via ambulatory accompanied by spouse. Patient in no acute distress.

## 2019-09-30 NOTE — ED PROVIDER NOTES
This is a 70-year-old female who presents ambulatory with to the emergency room with complaints of a headache that started on the right-hand side and now has progressed to both sides of her head. Patient states this started 4 days ago and now is associated with a cough, sneezing, sinus pain and pressure and congestion. Patient also states that she has diarrhea. Has taken ibuprofen and over-the-counter sinus medications with minimal relief. Comes to the emergency room for further evaluation. States the pain is 10 out of 10 currently but is not the worst headache of her life. Patient states she has had headaches that have been worse, had imaging that was negative refusing more imaging today. Denies any neurological deficits. There are no further complaints at this time.     Nishant Lackey MD  Past Medical History:  5/9/2017: Essential hypertension      Comment:  Takes HCTZ daily  9/11/2015: Hypothyroidism, adult  9/11/2015: Radiculopathy of cervical region  Past Surgical History:  2007: HX GYN      Comment:  D&C, tubal ligation  01/2018: HX ORTHOPAEDIC; Left      Comment:  Foot surgery / Callus removed              Past Medical History:   Diagnosis Date    Essential hypertension 5/9/2017    Takes HCTZ daily    Hypothyroidism, adult 9/11/2015    Radiculopathy of cervical region 9/11/2015       Past Surgical History:   Procedure Laterality Date    HX GYN  2007    D&C, tubal ligation    HX ORTHOPAEDIC Left 01/2018    Foot surgery / Callus removed          Family History:   Problem Relation Age of Onset    Hypertension Mother     Other Father         Hypercholesterol       Social History     Socioeconomic History    Marital status:      Spouse name: Not on file    Number of children: Not on file    Years of education: Not on file    Highest education level: Not on file   Occupational History    Not on file   Social Needs    Financial resource strain: Not on file    Food insecurity:     Worry: Not on file     Inability: Not on file    Transportation needs:     Medical: Not on file     Non-medical: Not on file   Tobacco Use    Smoking status: Never Smoker    Smokeless tobacco: Never Used   Substance and Sexual Activity    Alcohol use: Yes     Comment: occasionally    Drug use: No    Sexual activity: Not on file   Lifestyle    Physical activity:     Days per week: Not on file     Minutes per session: Not on file    Stress: Not on file   Relationships    Social connections:     Talks on phone: Not on file     Gets together: Not on file     Attends Worship service: Not on file     Active member of club or organization: Not on file     Attends meetings of clubs or organizations: Not on file     Relationship status: Not on file    Intimate partner violence:     Fear of current or ex partner: Not on file     Emotionally abused: Not on file     Physically abused: Not on file     Forced sexual activity: Not on file   Other Topics Concern    Not on file   Social History Narrative    Not on file         ALLERGIES: Flagyl [metronidazole]    Review of Systems   Constitutional: Negative for appetite change, chills, diaphoresis, fatigue and fever. HENT: Positive for congestion, sinus pressure and sinus pain. Negative for ear discharge, ear pain, sore throat and trouble swallowing. Eyes: Negative for photophobia, pain, redness and visual disturbance. Respiratory: Positive for cough. Negative for chest tightness, shortness of breath and wheezing. Cardiovascular: Negative for chest pain and palpitations. Gastrointestinal: Negative for abdominal distention, abdominal pain, nausea and vomiting. Endocrine: Negative. Genitourinary: Negative for difficulty urinating, flank pain, frequency and urgency. Musculoskeletal: Negative for back pain, neck pain and neck stiffness. Skin: Negative for color change, pallor, rash and wound. Allergic/Immunologic: Negative.     Neurological: Positive for headaches. Negative for dizziness, speech difficulty and weakness. Hematological: Does not bruise/bleed easily. Psychiatric/Behavioral: Negative for behavioral problems. The patient is not nervous/anxious. Vitals:    09/30/19 1012   BP: 149/88   Pulse: 83   Resp: 18   Temp: 98.3 °F (36.8 °C)   SpO2: 100%   Weight: 68.9 kg (152 lb)   Height: 5' (1.524 m)            Physical Exam   Constitutional: She is oriented to person, place, and time. She appears well-developed and well-nourished. No distress. HENT:   Head: Normocephalic and atraumatic. Right Ear: External ear normal.   Left Ear: External ear normal.   Nose: Nose normal.   Mouth/Throat: Oropharynx is clear and moist.   Congestion noted     Eyes: Pupils are equal, round, and reactive to light. Conjunctivae and EOM are normal. Right eye exhibits no discharge. Left eye exhibits no discharge. Neck: Normal range of motion. Neck supple. No JVD present. No tracheal deviation present. Cardiovascular: Normal rate, regular rhythm, normal heart sounds and intact distal pulses. Exam reveals no gallop. No murmur heard. Pulmonary/Chest: Effort normal and breath sounds normal. No respiratory distress. She has no wheezes. She has no rales. She exhibits no tenderness. Abdominal: Soft. Bowel sounds are normal. She exhibits no distension. There is no tenderness. There is no rebound and no guarding. Genitourinary:   Genitourinary Comments: Negative     Musculoskeletal: Normal range of motion. She exhibits no edema or tenderness. Neurological: She is alert and oriented to person, place, and time. No neurological deficits noted     Skin: Skin is warm and dry. No rash noted. No erythema. No pallor. Psychiatric: She has a normal mood and affect. Her behavior is normal. Judgment and thought content normal.   Nursing note and vitals reviewed.        MDM  Number of Diagnoses or Management Options  Acute sinusitis, recurrence not specified, unspecified location: new and does not require workup  Cough: new and does not require workup  Nonintractable headache, unspecified chronicity pattern, unspecified headache type: new and does not require workup  Diagnosis management comments: Plan:  Discharge to home and follow up with PCP. Return to the ED with worsening symptoms. Patient in agreement with plan of care. Procedures    11:29 AM  Pt has been reexamined. States she still has a headache but when offered CT scan she refused. Patient states she has had worse headaches and believes this is sinus related. Pt has no new complaints, changes or physical findings. Care plan outlined and precautions discussed. All available results were reviewed with pt. All medications were reviewed with pt. All of pt's questions and concerns were addressed. Pt agrees to F/U as instructed and agrees to return to ED upon further deterioration. Pt is ready to go home.   Liu Ovalles, NP

## 2019-10-01 ENCOUNTER — OFFICE VISIT (OUTPATIENT)
Dept: FAMILY MEDICINE CLINIC | Age: 39
End: 2019-10-01

## 2019-10-01 VITALS
BODY MASS INDEX: 29.45 KG/M2 | TEMPERATURE: 98.6 F | SYSTOLIC BLOOD PRESSURE: 124 MMHG | HEART RATE: 79 BPM | DIASTOLIC BLOOD PRESSURE: 85 MMHG | HEIGHT: 60 IN | OXYGEN SATURATION: 98 % | RESPIRATION RATE: 18 BRPM | WEIGHT: 150 LBS

## 2019-10-01 DIAGNOSIS — R19.7 DIARRHEA OF PRESUMED INFECTIOUS ORIGIN: Primary | ICD-10-CM

## 2019-10-01 DIAGNOSIS — R05.9 COUGH: ICD-10-CM

## 2019-10-01 DIAGNOSIS — J01.90 ACUTE NON-RECURRENT SINUSITIS, UNSPECIFIED LOCATION: ICD-10-CM

## 2019-10-01 RX ORDER — BENZONATATE 200 MG/1
200 CAPSULE ORAL
Qty: 30 CAP | Refills: 0 | Status: SHIPPED | OUTPATIENT
Start: 2019-10-01 | End: 2020-01-21

## 2019-10-01 NOTE — LETTER
NOTIFICATION RETURN TO WORK / SCHOOL 
 
10/1/2019 2:56 PM 
 
Ms. Obinna Pinto 1 [facility] [phone] [id number] To Whom It May Concern: 
 
Obinna Pinto is currently under the care of [address] [place] [address]. She will return to work/school on: 10/4/19 and is excused starting 9/28/19. If there are questions or concerns please have the patient contact our office.  
 
 
 
Sincerely, 
 
 
Mainor Wong MD

## 2019-10-01 NOTE — PROGRESS NOTES
Chief Complaint   Patient presents with   [facility] Follow Up     09/30/19 dx headache, cough, sinusitis     1. Have you been to the ER, urgent care clinic since your last visit? Hospitalized since your last visit? Yes 09/30/19 dx sinusitis, cough and headache    2. Have you seen or consulted any other health care providers outside of the [address] [place] [address] since your last visit? Include any pap smears or colon screening.  No

## 2019-10-01 NOTE — PROGRESS NOTES
HISTORY OF PRESENT ILLNESS  Sami Almonte is a 42 y.o. female. Patient presents with:  Hospital Follow Up: 09/30/19 dx headache, cough, sinusitis  Diarrhea: x 2 days, only after eating; pt states \"not eating b/c I don't want to keep going to bathroom\"    She was given Zithromax and has started this, however, she needs something for her cough. Her symptoms have improved a little. Evidently, she was having at least 2 liquid yellow stools a day, but since she stopped eating, it has been only once a day. Review of Systems   Constitutional: Positive for malaise/fatigue. Negative for chills and fever. HENT: Positive for congestion. Respiratory: Positive for cough and shortness of breath. Negative for sputum production and wheezing. Her sputum is yellow   Gastrointestinal: Positive for abdominal pain and diarrhea. Negative for blood in stool, constipation, melena, nausea and vomiting. Abdominal cramping with bm. Neurological: Negative for dizziness. Visit Vitals  /85   Pulse 79   Temp 98.6 °F (37 °C) (Oral)   Resp 18   Ht 5' (1.524 m)   Wt 150 lb (68 kg)   LMP 09/24/2019 (Approximate)   SpO2 98%   BMI 29.29 kg/m²     Physical Exam   Constitutional: She is oriented to person, place, and time. She appears well-developed and well-nourished. No distress. Cardiovascular: Normal rate, regular rhythm and normal heart sounds. Exam reveals no gallop and no friction rub. No murmur heard. Pulmonary/Chest: Effort normal and breath sounds normal. No respiratory distress. She has no wheezes. She has no rales. Abdominal: Soft. Normal appearance and bowel sounds are normal. She exhibits no distension. There is no hepatosplenomegaly. There is no tenderness. There is no rebound and no guarding. Neurological: She is alert and oriented to person, place, and time. Skin: Skin is warm and dry. She is not diaphoretic. Nursing note and vitals reviewed.       ASSESSMENT and PLAN    ICD-10-CM ICD-9-CM    1. Diarrhea of presumed infectious origin R19.7 009. 3 Bifidobacterium Infantis (ALIGN) 4 mg cap   2. Cough R05 786.2 benzonatate (TESSALON) 200 mg capsule   3. Acute non-recurrent sinusitis, unspecified location J01.90 461.9         Diarrhea likely viral  Cough due to sinusitis  Align  Push fluids  Rest  No dairy except for yogurt with live active cultures  Tessalon prn    Follow-up and Dispositions    · Return if diarrhea not better in 2 days. Reviewed plan of care. Patient has provided input and agrees with goals.

## 2019-10-01 NOTE — PATIENT INSTRUCTIONS

## 2020-01-17 ENCOUNTER — APPOINTMENT (OUTPATIENT)
Dept: CT IMAGING | Age: 40
End: 2020-01-17
Attending: EMERGENCY MEDICINE
Payer: COMMERCIAL

## 2020-01-17 ENCOUNTER — HOSPITAL ENCOUNTER (EMERGENCY)
Age: 40
Discharge: HOME | End: 2020-01-18
Attending: EMERGENCY MEDICINE
Payer: COMMERCIAL

## 2020-01-17 DIAGNOSIS — E87.6 HYPOKALEMIA: ICD-10-CM

## 2020-01-17 DIAGNOSIS — R10.31 ABDOMINAL PAIN, RIGHT LOWER QUADRANT: ICD-10-CM

## 2020-01-17 DIAGNOSIS — N28.9 KIDNEY LESION: ICD-10-CM

## 2020-01-17 DIAGNOSIS — K76.9 LIVER LESION: ICD-10-CM

## 2020-01-17 DIAGNOSIS — R11.2 NAUSEA AND VOMITING, INTRACTABILITY OF VOMITING NOT SPECIFIED, UNSPECIFIED VOMITING TYPE: Primary | ICD-10-CM

## 2020-01-17 LAB
ALBUMIN SERPL-MCNC: 3.5 G/DL (ref 3.5–5)
ALBUMIN/GLOB SERPL: 0.9 {RATIO} (ref 1.1–2.2)
ALP SERPL-CCNC: 59 U/L (ref 45–117)
ALT SERPL-CCNC: 21 U/L (ref 12–78)
ANION GAP SERPL CALC-SCNC: 4 MMOL/L (ref 5–15)
APPEARANCE UR: ABNORMAL
AST SERPL-CCNC: 11 U/L (ref 15–37)
BACTERIA URNS QL MICRO: ABNORMAL /HPF
BASOPHILS # BLD: 0 K/UL (ref 0–0.1)
BASOPHILS NFR BLD: 0 % (ref 0–1)
BILIRUB SERPL-MCNC: 0.3 MG/DL (ref 0.2–1)
BILIRUB UR QL: NEGATIVE
BUN SERPL-MCNC: 18 MG/DL (ref 6–20)
BUN/CREAT SERPL: 19 (ref 12–20)
CALCIUM SERPL-MCNC: 8.4 MG/DL (ref 8.5–10.1)
CHLORIDE SERPL-SCNC: 104 MMOL/L (ref 97–108)
CO2 SERPL-SCNC: 30 MMOL/L (ref 21–32)
COLOR UR: ABNORMAL
COMMENT, HOLDF: NORMAL
CREAT SERPL-MCNC: 0.93 MG/DL (ref 0.55–1.02)
DIFFERENTIAL METHOD BLD: NORMAL
EOSINOPHIL # BLD: 0.1 K/UL (ref 0–0.4)
EOSINOPHIL NFR BLD: 2 % (ref 0–7)
EPITH CASTS URNS QL MICRO: ABNORMAL /LPF
ERYTHROCYTE [DISTWIDTH] IN BLOOD BY AUTOMATED COUNT: 13.3 % (ref 11.5–14.5)
GLOBULIN SER CALC-MCNC: 3.9 G/DL (ref 2–4)
GLUCOSE SERPL-MCNC: 101 MG/DL (ref 65–100)
GLUCOSE UR STRIP.AUTO-MCNC: NEGATIVE MG/DL
HCG UR QL: NEGATIVE
HCT VFR BLD AUTO: 40.3 % (ref 35–47)
HGB BLD-MCNC: 13.2 G/DL (ref 11.5–16)
HGB UR QL STRIP: ABNORMAL
IMM GRANULOCYTES # BLD AUTO: 0 K/UL (ref 0–0.04)
IMM GRANULOCYTES NFR BLD AUTO: 0 % (ref 0–0.5)
KETONES UR QL STRIP.AUTO: NEGATIVE MG/DL
LEUKOCYTE ESTERASE UR QL STRIP.AUTO: ABNORMAL
LIPASE SERPL-CCNC: 102 U/L (ref 73–393)
LYMPHOCYTES # BLD: 2.2 K/UL (ref 0.8–3.5)
LYMPHOCYTES NFR BLD: 28 % (ref 12–49)
MCH RBC QN AUTO: 31.3 PG (ref 26–34)
MCHC RBC AUTO-ENTMCNC: 32.8 G/DL (ref 30–36.5)
MCV RBC AUTO: 95.5 FL (ref 80–99)
MONOCYTES # BLD: 0.5 K/UL (ref 0–1)
MONOCYTES NFR BLD: 6 % (ref 5–13)
MUCOUS THREADS URNS QL MICRO: ABNORMAL /LPF
NEUTS SEG # BLD: 5 K/UL (ref 1.8–8)
NEUTS SEG NFR BLD: 64 % (ref 32–75)
NITRITE UR QL STRIP.AUTO: NEGATIVE
NRBC # BLD: 0 K/UL (ref 0–0.01)
NRBC BLD-RTO: 0 PER 100 WBC
PH UR STRIP: 6 [PH] (ref 5–8)
PLATELET # BLD AUTO: 232 K/UL (ref 150–400)
PMV BLD AUTO: 11.7 FL (ref 8.9–12.9)
POTASSIUM SERPL-SCNC: 3.1 MMOL/L (ref 3.5–5.1)
PROT SERPL-MCNC: 7.4 G/DL (ref 6.4–8.2)
PROT UR STRIP-MCNC: NEGATIVE MG/DL
RBC # BLD AUTO: 4.22 M/UL (ref 3.8–5.2)
RBC #/AREA URNS HPF: ABNORMAL /HPF (ref 0–5)
SAMPLES BEING HELD,HOLD: NORMAL
SODIUM SERPL-SCNC: 138 MMOL/L (ref 136–145)
SP GR UR REFRACTOMETRY: 1.02 (ref 1–1.03)
UA: UC IF INDICATED,UAUC: ABNORMAL
UROBILINOGEN UR QL STRIP.AUTO: 1 EU/DL (ref 0.2–1)
WBC # BLD AUTO: 7.8 K/UL (ref 3.6–11)
WBC URNS QL MICRO: ABNORMAL /HPF (ref 0–4)

## 2020-01-17 PROCEDURE — 74011250637 HC RX REV CODE- 250/637: Performed by: EMERGENCY MEDICINE

## 2020-01-17 PROCEDURE — 99284 EMERGENCY DEPT VISIT MOD MDM: CPT

## 2020-01-17 PROCEDURE — 81001 URINALYSIS AUTO W/SCOPE: CPT

## 2020-01-17 PROCEDURE — 85025 COMPLETE CBC W/AUTO DIFF WBC: CPT

## 2020-01-17 PROCEDURE — 74177 CT ABD & PELVIS W/CONTRAST: CPT

## 2020-01-17 PROCEDURE — 87086 URINE CULTURE/COLONY COUNT: CPT

## 2020-01-17 PROCEDURE — 74011636320 HC RX REV CODE- 636/320: Performed by: RADIOLOGY

## 2020-01-17 PROCEDURE — 81025 URINE PREGNANCY TEST: CPT

## 2020-01-17 PROCEDURE — 96375 TX/PRO/DX INJ NEW DRUG ADDON: CPT

## 2020-01-17 PROCEDURE — 74011250636 HC RX REV CODE- 250/636: Performed by: EMERGENCY MEDICINE

## 2020-01-17 PROCEDURE — 83690 ASSAY OF LIPASE: CPT

## 2020-01-17 PROCEDURE — 96374 THER/PROPH/DIAG INJ IV PUSH: CPT

## 2020-01-17 PROCEDURE — 80053 COMPREHEN METABOLIC PANEL: CPT

## 2020-01-17 PROCEDURE — 96361 HYDRATE IV INFUSION ADD-ON: CPT

## 2020-01-17 PROCEDURE — 93005 ELECTROCARDIOGRAM TRACING: CPT

## 2020-01-17 RX ORDER — DIPHENHYDRAMINE HYDROCHLORIDE 50 MG/ML
25 INJECTION, SOLUTION INTRAMUSCULAR; INTRAVENOUS
Status: COMPLETED | OUTPATIENT
Start: 2020-01-17 | End: 2020-01-17

## 2020-01-17 RX ORDER — PROCHLORPERAZINE EDISYLATE 5 MG/ML
10 INJECTION INTRAMUSCULAR; INTRAVENOUS
Status: COMPLETED | OUTPATIENT
Start: 2020-01-17 | End: 2020-01-17

## 2020-01-17 RX ORDER — ONDANSETRON 4 MG/1
4 TABLET, ORALLY DISINTEGRATING ORAL
Qty: 10 TAB | Refills: 0 | Status: SHIPPED | OUTPATIENT
Start: 2020-01-17 | End: 2020-01-21

## 2020-01-17 RX ADMIN — DIPHENHYDRAMINE HYDROCHLORIDE 25 MG: 50 INJECTION, SOLUTION INTRAMUSCULAR; INTRAVENOUS at 21:44

## 2020-01-17 RX ADMIN — POTASSIUM BICARBONATE 40 MEQ: 782 TABLET, EFFERVESCENT ORAL at 23:23

## 2020-01-17 RX ADMIN — SODIUM CHLORIDE 1000 ML: 900 INJECTION, SOLUTION INTRAVENOUS at 21:44

## 2020-01-17 RX ADMIN — IOPAMIDOL 100 ML: 755 INJECTION, SOLUTION INTRAVENOUS at 22:23

## 2020-01-17 RX ADMIN — PROCHLORPERAZINE EDISYLATE 10 MG: 5 INJECTION INTRAMUSCULAR; INTRAVENOUS at 21:44

## 2020-01-18 VITALS
BODY MASS INDEX: 30.04 KG/M2 | TEMPERATURE: 98.2 F | SYSTOLIC BLOOD PRESSURE: 128 MMHG | OXYGEN SATURATION: 98 % | HEART RATE: 74 BPM | HEIGHT: 60 IN | DIASTOLIC BLOOD PRESSURE: 84 MMHG | RESPIRATION RATE: 16 BRPM | WEIGHT: 153 LBS

## 2020-01-18 NOTE — ED PROVIDER NOTES
42 y.o. female with past medical history significant for HTN and hypothyroidism who presents from home with chief complaint of nausea/vomiting. The patient complains of nausea with associated vomiting that began yesterday at work. She notes two episodes of vomiting today. She also complains of non-radiating right side abdominal pain. She notes she has been around coworkers with similar symptoms. She does not report taking any medication prior to arrival and there are no alleviating factors. She reports the only medication she takes daily is Hydrochlorothiazide. She notes she is currently on her menstrual cycle. She specifically denies hematochezia, dysuria, and fever. There are no other acute medical concerns at this time. Social hx: denies tobacco, occasional alcohol, denies illicit drug use  Surgical Hx: D&C, tubal ligation, callus removal,  section x2  Allergies: Flagyl    PCP: Indio Lopez MD    Note written by Priscila Brandt, as dictated by Jacinto Navarro MD 9:21 PM        The history is provided by the patient. No  was used.         Past Medical History:   Diagnosis Date    Essential hypertension 2017    Takes HCTZ daily    Hypothyroidism, adult 2015    Radiculopathy of cervical region 2015       Past Surgical History:   Procedure Laterality Date    HX GYN  2007    D&C, tubal ligation    HX ORTHOPAEDIC Left 2018    Foot surgery / Callus removed          Family History:   Problem Relation Age of Onset    Hypertension Mother     Other Father         Hypercholesterol       Social History     Socioeconomic History    Marital status:      Spouse name: Not on file    Number of children: Not on file    Years of education: Not on file    Highest education level: Not on file   Occupational History    Not on file   Social Needs    Financial resource strain: Not on file    Food insecurity:     Worry: Not on file     Inability: Not on file   [employer] needs:     Medical: Not on file     Non-medical: Not on file   Tobacco Use    Smoking status: Never Smoker    Smokeless tobacco: Never Used   Substance and Sexual Activity    Alcohol use: Yes     Comment: occasionally    Drug use: No    Sexual activity: Not on file   Lifestyle    Physical activity:     Days per week: Not on file     Minutes per session: Not on file    Stress: Not on file   Relationships    Social connections:     Talks on phone: Not on file     Gets together: Not on file     Attends Scientologist service: Not on file     Active member of club or organization: Not on file     Attends meetings of clubs or organizations: Not on file     Relationship status: Not on file    Intimate partner violence:     Fear of current or ex partner: Not on file     Emotionally abused: Not on file     Physically abused: Not on file     Forced sexual activity: Not on file   Other Topics Concern    Not on file   Social History Narrative    Not on file         ALLERGIES: Flagyl [metronidazole]    Review of Systems   Constitutional: Negative for fever. Gastrointestinal: Positive for abdominal pain, nausea and vomiting. Negative for blood in stool. Genitourinary: Negative for dysuria. All other systems reviewed and are negative.       Vitals:    01/17/20 2111   BP: 124/73   Pulse: 79   Resp: 18   Temp: 98.2 °F (36.8 °C)   SpO2: 98%   Weight: 69.4 kg (153 lb)   Height: 5' (1.524 m)            Physical Exam   Physical Examination: General appearance - alert, well appearing, and in no distress, oriented to person, place, and time and normal appearing weight  Eyes - pupils equal and reactive, extraocular eye movements intact  Neck - supple, no significant adenopathy  Chest - clear to auscultation, no wheezes, rales or rhonchi, symmetric air entry  Heart - normal rate, regular rhythm, normal S1, S2, no murmurs, rubs, clicks or gallops  Abdomen - soft, mild tenderness to RLQ, no rebound/guarding/peritoneal signs, nondistended, no masses or organomegaly  Back exam - full range of motion, no tenderness, palpable spasm or pain on motion  Neurological - alert, oriented, normal speech, no focal findings or movement disorder noted  Musculoskeletal - no joint tenderness, deformity or swelling  Extremities - peripheral pulses normal, no pedal edema, no clubbing or cyanosis  Skin - normal coloration and turgor, no rashes, no suspicious skin lesions noted  MDM  Number of Diagnoses or Management Options     Amount and/or Complexity of Data Reviewed  Clinical lab tests: ordered and reviewed  Tests in the radiology section of CPT®: ordered and reviewed  Decide to obtain previous medical records or to obtain history from someone other than the patient: yes  Review and summarize past medical records: yes  Independent visualization of images, tracings, or specimens: yes    Patient Progress  Patient progress: improved         Procedures  ED EKG interpretation:  Rhythm: normal sinus rhythm; and regular . Rate (approx.): 74; Axis: normal; P wave: normal; QRS interval: normal ; ST/T wave: non-specific changes;  EKG documented by Opal Huang MD    Labs and CT without acute process. Discussed incidental findings from CT with pt and pt given copy of test results for f/u. VSS. Pt tolerating PO. Will d/c with pcp f/u. No urinary symptoms. Will send urine culture.

## 2020-01-18 NOTE — ED TRIAGE NOTES
Pt ambulatory to triage, c/o nausea and CP starting yesterday. States that yesterday was checked by EMS, had normal EKG. , did not go to hospital.  States that CP stopped yesterday as well, nausea has continued, states feels \"sore\" with generalized aches and pains, and headache. States that pains are in R flank, both arms, and L left. States that symptoms all started yesterday at work. Has not taken any medications at home for pain, reports vomiting x2 today.   No CP at this time

## 2020-01-18 NOTE — DISCHARGE INSTRUCTIONS
Patient Education        Abdominal Pain: Care Instructions  Your Care Instructions    Abdominal pain has many possible causes. Some aren't serious and get better on their own in a few days. Others need more testing and treatment. If your pain continues or gets worse, you need to be rechecked and may need more tests to find out what is wrong. You may need surgery to correct the problem. Don't ignore new symptoms, such as fever, nausea and vomiting, urination problems, pain that gets worse, and dizziness. These may be signs of a more serious problem. Your doctor may have recommended a follow-up visit in the next 8 to 12 hours. If you are not getting better, you may need more tests or treatment. The doctor has checked you carefully, but problems can develop later. If you notice any problems or new symptoms, get medical treatment right away. Follow-up care is a key part of your treatment and safety. Be sure to make and go to all appointments, and call your doctor if you are having problems. It's also a good idea to know your test results and keep a list of the medicines you take. How can you care for yourself at home? · Rest until you feel better. · To prevent dehydration, drink plenty of fluids, enough so that your urine is light yellow or clear like water. Choose water and other caffeine-free clear liquids until you feel better. If you have kidney, heart, or liver disease and have to limit fluids, talk with your doctor before you increase the amount of fluids you drink. · If your stomach is upset, eat mild foods, such as rice, dry toast or crackers, bananas, and applesauce. Try eating several small meals instead of two or three large ones. · Wait until 48 hours after all symptoms have gone away before you have spicy foods, alcohol, and drinks that contain caffeine. · Do not eat foods that are high in fat. · Avoid anti-inflammatory medicines such as aspirin, ibuprofen (Advil, Motrin), and naproxen (Aleve). These can cause stomach upset. Talk to your doctor if you take daily aspirin for another health problem. When should you call for help? Call 911 anytime you think you may need emergency care. For example, call if:    · You passed out (lost consciousness).     · You pass maroon or very bloody stools.     · You vomit blood or what looks like coffee grounds.     · You have new, severe belly pain.    Call your doctor now or seek immediate medical care if:    · Your pain gets worse, especially if it becomes focused in one area of your belly.     · You have a new or higher fever.     · Your stools are black and look like tar, or they have streaks of blood.     · You have unexpected vaginal bleeding.     · You have symptoms of a urinary tract infection. These may include:  ? Pain when you urinate. ? Urinating more often than usual.  ? Blood in your urine.     · You are dizzy or lightheaded, or you feel like you may faint.    Watch closely for changes in your health, and be sure to contact your doctor if:    · You are not getting better after 1 day (24 hours). Where can you learn more? Go to http://veraBillShrinkdouglas.info/. Enter Q784 in the search box to learn more about \"Abdominal Pain: Care Instructions. \"  Current as of: June 26, 2019  Content Version: 12.2  © 5292-4519 [employer]. Care instructions adapted under license by [employer] (which disclaims liability or warranty for this information). If you have questions about a medical condition or this instruction, always ask your healthcare professional. [place] [address] any warranty or liability for your use of this information. Patient Education        Hypokalemia: Care Instructions  Your Care Instructions    Hypokalemia (say \"cj-ys-krv-TOBY-raleigh-uh\") is a low level of potassium. The heart, muscles, kidneys, and nervous system all need potassium to work well. This problem has many different causes. Kidney problems, diet, and medicines like diuretics and laxatives can cause it. So can vomiting or diarrhea. In some cases, cancer is the cause. Your doctor may do tests to find the cause of your low potassium levels. You may need medicines to bring your potassium levels back to normal. You may also need regular blood tests to check your potassium. If you have very low potassium, you may need intravenous (IV) medicines. You also may need tests to check the electrical activity of your heart. Heart problems caused by low potassium levels can be very serious. Follow-up care is a key part of your treatment and safety. Be sure to make and go to all appointments, and call your doctor if you are having problems. It's also a good idea to know your test results and keep a list of the medicines you take. How can you care for yourself at home? · If your doctor recommends it, eat foods that have a lot of potassium. These include fresh fruits, juices, and vegetables. They also include nuts, beans, and milk. · Be safe with medicines. If your doctor prescribes medicines or potassium supplements, take them exactly as directed. Call your doctor if you have any problems with your medicines. · Get your potassium levels tested as often as your doctor tells you. When should you call for help? Call 911 anytime you think you may need emergency care. For example, call if:    · You feel like your heart is missing beats.  Heart problems caused by low potassium can cause death.     · You passed out (lost consciousness).     · You have a seizure.    Call your doctor now or seek immediate medical care if:    · You feel weak or unusually tired.     · You have severe arm or leg cramps.     · You have tingling or numbness.     · You feel sick to your stomach, or you vomit.     · You have belly cramps.     · You feel bloated or constipated.     · You have to urinate a lot.     · You feel very thirsty most of the time.     · You are dizzy or lightheaded, or you feel like you may faint.     · You feel depressed, or you lose touch with reality.    Watch closely for changes in your health, and be sure to contact your doctor if:    · You do not get better as expected. Where can you learn more? Go to http://vera-douglas.info/. Enter G358 in the search box to learn more about \"Hypokalemia: Care Instructions. \"  Current as of: November 6, 2018  Content Version: 12.2  © 7550-9627 [employer]. Care instructions adapted under license by [employer] (which disclaims liability or warranty for this information). If you have questions about a medical condition or this instruction, always ask your healthcare professional. [address] any warranty or liability for your use of this information. Patient Education        Nausea and Vomiting: Care Instructions  Your Care Instructions    When you are nauseated, you may feel weak and sweaty and notice a lot of saliva in your mouth. Nausea often leads to vomiting. Most of the time you do not need to worry about nausea and vomiting, but they can be signs of other illnesses. Two common causes of nausea and vomiting are stomach flu and food poisoning. Nausea and vomiting from viral stomach flu will usually start to improve within 24 hours. Nausea and vomiting from food poisoning may last from 12 to 48 hours. The doctor has checked you carefully, but problems can develop later. If you notice any problems or new symptoms, get medical treatment right away. Follow-up care is a key part of your treatment and safety. Be sure to make and go to all appointments, and call your doctor if you are having problems. It's also a good idea to know your test results and keep a list of the medicines you take. How can you care for yourself at home? · To prevent dehydration, drink plenty of fluids, enough so that your urine is light yellow or clear like water.  Choose water and other caffeine-free clear liquids until you feel better. If you have kidney, heart, or liver disease and have to limit fluids, talk with your doctor before you increase the amount of fluids you drink. · Rest in bed until you feel better. · When you are able to eat, try clear soups, mild foods, and liquids until all symptoms are gone for 12 to 48 hours. Other good choices include dry toast, crackers, cooked cereal, and gelatin dessert, such as Jell-O. When should you call for help? Call 911 anytime you think you may need emergency care. For example, call if:    · You passed out (lost consciousness).    Call your doctor now or seek immediate medical care if:    · You have symptoms of dehydration, such as:  ? Dry eyes and a dry mouth. ? Passing only a little dark urine. ? Feeling thirstier than usual.     · You have new or worsening belly pain.     · You have a new or higher fever.     · You vomit blood or what looks like coffee grounds.    Watch closely for changes in your health, and be sure to contact your doctor if:    · You have ongoing nausea and vomiting.     · Your vomiting is getting worse.     · Your vomiting lasts longer than 2 days.     · You are not getting better as expected. Where can you learn more? Go to http://vera-douglas.info/. Enter 25 [id number] in the search box to learn more about \"Nausea and Vomiting: Care Instructions. \"  Current as of: June 26, 2019  Content Version: 12.2  © 5818-3158 [employer]. Care instructions adapted under license by [employer] (which disclaims liability or warranty for this information). If you have questions about a medical condition or this instruction, always ask your healthcare professional. [place] [address] any warranty or liability for your use of this information. We hope that we have addressed all of your medical concerns.  The examination and treatment you received in the Emergency Department were for an emergent problem and were not intended as complete care. It is important that you follow up with your healthcare provider(s) for ongoing care. If your symptoms worsen or do not improve as expected, and you are unable to reach your usual health care provider(s), you should return to the Emergency Department. Today's healthcare is undergoing tremendous change, and patient satisfaction surveys are one of the many tools to assess the quality of medical care. You may receive a survey from the [employer] organization regarding your experience in the Emergency Department. I hope that your experience has been completely positive, particularly the medical care that I provided. As such, please participate in the survey; anything less than excellent does not meet my expectations or intentions. [facility] and [facility] participate in nationally recognized quality of care measures. If your blood pressure is greater than 120/80, as reported below, we urge that you seek medical care to address the potential of high blood pressure, commonly known as hypertension. Hypertension can be hereditary or can be caused by certain medical conditions, pain, stress, or \"white coat syndrome. \"       Please make an appointment with your health care provider(s) for follow up of your Emergency Department visit. VITALS:   Patient Vitals for the past 8 hrs:   Temp Pulse Resp BP SpO2   01/17/20 2111 98.2 °F (36.8 °C) 79 18 124/73 98 %          Thank you for allowing us to provide you with medical care today. We realize that you have many choices for your emergency care needs. Please choose us in the future for any continued health care needs. Yusuf Shahelor  [address]   Office: [phone]            Recent Results (from the past 24 hour(s))   [address] [facility] [address]    Collection Time: 01/17/20 9:24 PM   Result Value Ref Range    SAMPLES BEING HELD 1pst,1lav     COMMENT        Add-on orders for these samples will be processed based on acceptable specimen integrity and analyte stability, which may vary by analyte. CBC WITH AUTOMATED DIFF    Collection Time: 01/17/20  9:24 PM   Result Value Ref Range    WBC 7.8 3.6 - 11.0 K/uL    RBC 4.22 3.80 - 5.20 M/uL    HGB 13.2 11.5 - 16.0 g/dL    HCT 40.3 35.0 - 47.0 %    MCV 95.5 80.0 - 99.0 FL    MCH 31.3 26.0 - 34.0 PG    MCHC 32.8 30.0 - 36.5 g/dL    RDW 13.3 11.5 - 14.5 %    PLATELET 073 662 - 696 K/uL    MPV 11.7 8.9 - 12.9 FL    NRBC 0.0 0  WBC    ABSOLUTE NRBC 0.00 0.00 - 0.01 K/uL    NEUTROPHILS 64 32 - 75 %    LYMPHOCYTES 28 12 - 49 %    MONOCYTES 6 5 - 13 %    EOSINOPHILS 2 0 - 7 %    BASOPHILS 0 0 - 1 %    IMMATURE GRANULOCYTES 0 0.0 - 0.5 %    ABS. NEUTROPHILS 5.0 1.8 - 8.0 K/UL    ABS. LYMPHOCYTES 2.2 0.8 - 3.5 K/UL    ABS. MONOCYTES 0.5 0.0 - 1.0 K/UL    ABS. EOSINOPHILS 0.1 0.0 - 0.4 K/UL    ABS. BASOPHILS 0.0 0.0 - 0.1 K/UL    ABS. IMM. GRANS. 0.0 0.00 - 0.04 K/UL    DF AUTOMATED     METABOLIC PANEL, COMPREHENSIVE    Collection Time: 01/17/20  9:24 PM   Result Value Ref Range    Sodium 138 136 - 145 mmol/L    Potassium 3.1 (L) 3.5 - 5.1 mmol/L    Chloride 104 97 - 108 mmol/L    CO2 30 21 - 32 mmol/L    Anion gap 4 (L) 5 - 15 mmol/L    Glucose 101 (H) 65 - 100 mg/dL    BUN 18 6 - 20 MG/DL    Creatinine 0.93 0.55 - 1.02 MG/DL    BUN/Creatinine ratio 19 12 - 20      GFR est AA >60 >60 ml/min/1.73m2    GFR est non-AA >60 >60 ml/min/1.73m2    Calcium 8.4 (L) 8.5 - 10.1 MG/DL    Bilirubin, total 0.3 0.2 - 1.0 MG/DL    ALT (SGPT) 21 12 - 78 U/L    AST (SGOT) 11 (L) 15 - 37 U/L    Alk.  phosphatase 59 45 - 117 U/L    Protein, total 7.4 6.4 - 8.2 g/dL    Albumin 3.5 3.5 - 5.0 g/dL    Globulin 3.9 2.0 - 4.0 g/dL    A-G Ratio 0.9 (L) 1.1 - 2.2     LIPASE    Collection Time: 01/17/20  9:24 PM   Result Value Ref Range    Lipase 102 73 - 393 U/L URINALYSIS W/ REFLEX CULTURE    Collection Time: 01/17/20  9:55 PM   Result Value Ref Range    Color YELLOW/STRAW      Appearance CLOUDY (A) CLEAR      Specific gravity 1.023 1.003 - 1.030      pH (UA) 6.0 5.0 - 8.0      Protein NEGATIVE  NEG mg/dL    Glucose NEGATIVE  NEG mg/dL    Ketone NEGATIVE  NEG mg/dL    Bilirubin NEGATIVE  NEG      Blood LARGE (A) NEG      Urobilinogen 1.0 0.2 - 1.0 EU/dL    Nitrites NEGATIVE  NEG      Leukocyte Esterase MODERATE (A) NEG      WBC 5-10 0 - 4 /hpf    RBC 0-5 0 - 5 /hpf    Epithelial cells MODERATE (A) FEW /lpf    Bacteria 2+ (A) NEG /hpf    UA:UC IF INDICATED URINE CULTURE ORDERED (A) CNI      Mucus 1+ (A) NEG /lpf   HCG URINE, QL. - POC    Collection Time: 01/17/20  9:59 PM   Result Value Ref Range    Pregnancy test,urine (POC) NEGATIVE  NEG         Ct Abd Pelv W Cont    Result Date: 1/17/2020  EXAM:  CT ABDOMEN PELVIS WITH CONTRAST INDICATION:  rlq pain. Additional history: COMPARISON: CT the abdomen and pelvis, 4/26/2019. Jason Jacques TECHNIQUE: Multislice helical CT was performed from the diaphragm to the symphysis pubis with oral and intravenous contrast administration. Contiguous 5 mm axial images were reconstructed and lung and soft tissue windows were generated. Coronal and sagittal reformations were generated. CT dose reduction was achieved through use of a standardized protocol tailored for this examination and automatic exposure control for dose modulation. [facility] FINDINGS: INCIDENTALLY IMAGED CHEST: Heart/vessels: Within normal limits. Lungs/Pleura: Within normal limits. . ABDOMEN: Liver: There is a subcentimeter, low-attenuation lesion which is too small to accurately characterize and statistically likely benign. Gallbladder/Biliary: Within normal limits. Spleen: Within normal limits. Pancreas: Within normal limits. Adrenals: Within normal limits. Kidneys: Low-attenuation lesion in the interpolar region of the left kidney which is too small to accurately characterize. Low-attenuation lesion anterior aspect of the interpolar right kidney which is too small to accurately characterize. Peritoneum/Mesenteries: Within normal limits. Extraperitoneum: Within normal limits. Gastrointestinal tract: Within normal limits. Normal-appearing appendix Vascular: Within normal limits. Katherene Means PELVIS: Extraperitoneum: Calculi in the floor the pelvis suggesting phleboliths. Ureters: Within normal limits. Bladder: Within normal limits. Reproductive System: Within normal limits. Tubal ligation clips present . MSK: Tiny, fat-containing umbilical hernia. Katherene Means IMPRESSION: 1. No CT explanation for the patient's right lower quadrant pain. General

## 2020-01-18 NOTE — ED NOTES
Introduced self to patient. Respirations even and unlabored     10:46 PM  Rounding on patient and noted to have here eyes closed resting in the stretcher. Respirations even and unlabored.

## 2020-01-19 LAB
BACTERIA SPEC CULT: NORMAL
CC UR VC: NORMAL
SERVICE CMNT-IMP: NORMAL

## 2020-01-20 LAB
ATRIAL RATE: 74 BPM
CALCULATED P AXIS, ECG09: 67 DEGREES
CALCULATED R AXIS, ECG10: 2 DEGREES
CALCULATED T AXIS, ECG11: 31 DEGREES
DIAGNOSIS, 93000: NORMAL
P-R INTERVAL, ECG05: 158 MS
Q-T INTERVAL, ECG07: 394 MS
QRS DURATION, ECG06: 84 MS
QTC CALCULATION (BEZET), ECG08: 437 MS
VENTRICULAR RATE, ECG03: 74 BPM

## 2020-01-20 NOTE — PROGRESS NOTES
HISTORY OF PRESENT ILLNESS  Christie Rodgers is a 42 y.o. female. Patient presents with:  Vomiting: last Thurs and Fri; went to ER last Friday pt states EKG was performed and results were normal  Chest Pain: last Thursday; pt states subsided    She was seen in the ER on the 17th for N/V and abdominal pain. An EKG was done and it was normal.  Occasional nausea, no vomiting or abdominal pain. Nothing makes the nausea worse, Zofran resolved her symptoms, but she has not taken it for several.  The nausea continues to get better. ER note reviewed. Review of Systems   Constitutional: Positive for fever and malaise/fatigue. Negative for chills and weight loss. Initially, she had a subjective fever   Gastrointestinal: Positive for nausea. Negative for abdominal pain, blood in stool, constipation, diarrhea, heartburn, melena and vomiting. Musculoskeletal: Negative for myalgias. Neurological: Positive for headaches. Visit Vitals  /77   Pulse 79   Temp 98.4 °F (36.9 °C) (Oral)   Resp 18   Ht 5' (1.524 m)   Wt 149 lb (67.6 kg)   BMI 29.10 kg/m²     Physical Exam  Vitals signs and nursing note reviewed. Constitutional:       General: She is not in acute distress. Appearance: Normal appearance. She is well-developed. She is not diaphoretic. HENT:      Mouth/Throat:      Mouth: Mucous membranes are dry. Cardiovascular:      Rate and Rhythm: Normal rate and regular rhythm. Heart sounds: Normal heart sounds. No murmur. No friction rub. No gallop. Pulmonary:      Effort: Pulmonary effort is normal. No respiratory distress. Breath sounds: Normal breath sounds. No wheezing or rales. Abdominal:      General: Bowel sounds are normal. There is no distension. Palpations: Abdomen is soft. Tenderness: There is no tenderness. There is no guarding or rebound. Skin:     General: Skin is warm and dry.    Neurological:      Mental Status: She is alert and oriented to person, place, and time. ASSESSMENT and PLAN    ICD-10-CM ICD-9-CM    1. Viral gastroenteritis A08.4 008.8    2. Dehydration E86.0 276.51         Resolving  Rest, push fluids  [place] diet    Follow-up and Dispositions    · Return if not better in 3 days. Reviewed plan of care. Patient has provided input and agrees with goals.

## 2020-01-21 ENCOUNTER — OFFICE VISIT (OUTPATIENT)
Dept: FAMILY MEDICINE CLINIC | Age: 40
End: 2020-01-21

## 2020-01-21 VITALS
RESPIRATION RATE: 18 BRPM | DIASTOLIC BLOOD PRESSURE: 77 MMHG | WEIGHT: 149 LBS | BODY MASS INDEX: 29.25 KG/M2 | TEMPERATURE: 98.4 F | HEIGHT: 60 IN | HEART RATE: 79 BPM | SYSTOLIC BLOOD PRESSURE: 128 MMHG

## 2020-01-21 DIAGNOSIS — E86.0 DEHYDRATION: ICD-10-CM

## 2020-01-21 DIAGNOSIS — A08.4 VIRAL GASTROENTERITIS: Primary | ICD-10-CM

## 2020-01-21 NOTE — PROGRESS NOTES
Chief Complaint   Patient presents with    Vomiting     last Thurs and Fri; went to ER last Friday pt states EKG was performed and results were normal    Chest Pain     last Thursday; pt states subsided     1. Have you been to the ER, urgent care clinic since your last visit? Hospitalized since your last visit? Yes [facility] 01/17/20 for chest pain and vomiting    2. Have you seen or consulted any other health care providers outside of the [address] [place] [address] since your last visit? Include any pap smears or colon screening.  No

## 2020-01-21 NOTE — LETTER
NOTIFICATION RETURN TO WORK / SCHOOL 
 
1/21/2020 10:38 AM 
 
Ms. Noreen Johnston [id number] To Whom It May Concern: 
 
Noreen Johnston is currently under the care of [address] [place] [address]. She will return to work/school on: 1/23/19. If there are questions or concerns please have the patient contact our office.  
 
 
 
Sincerely, 
 
 
Kristina Ramos MD

## 2020-01-30 DIAGNOSIS — I10 ESSENTIAL HYPERTENSION: ICD-10-CM

## 2020-01-30 RX ORDER — HYDROCHLOROTHIAZIDE 25 MG/1
TABLET ORAL
Qty: 90 TAB | Refills: 4 | Status: SHIPPED | OUTPATIENT
Start: 2020-01-30 | End: 2021-05-28 | Stop reason: SDUPTHER

## 2020-05-13 ENCOUNTER — HOSPITAL ENCOUNTER (OUTPATIENT)
Dept: PREADMISSION TESTING | Age: 40
Discharge: HOME | End: 2020-05-13
Payer: COMMERCIAL

## 2020-05-13 VITALS
HEART RATE: 73 BPM | BODY MASS INDEX: 31.02 KG/M2 | HEIGHT: 60 IN | SYSTOLIC BLOOD PRESSURE: 119 MMHG | DIASTOLIC BLOOD PRESSURE: 79 MMHG | WEIGHT: 158 LBS | TEMPERATURE: 98.6 F

## 2020-05-13 LAB
BASOPHILS # BLD: 0 K/UL (ref 0–0.1)
BASOPHILS NFR BLD: 0 % (ref 0–1)
DIFFERENTIAL METHOD BLD: NORMAL
EOSINOPHIL # BLD: 0 K/UL (ref 0–0.4)
EOSINOPHIL NFR BLD: 1 % (ref 0–7)
ERYTHROCYTE [DISTWIDTH] IN BLOOD BY AUTOMATED COUNT: 13.2 % (ref 11.5–14.5)
HCG UR QL: NEGATIVE
HCT VFR BLD AUTO: 40.4 % (ref 35–47)
HGB BLD-MCNC: 12.8 G/DL (ref 11.5–16)
IMM GRANULOCYTES # BLD AUTO: 0 K/UL (ref 0–0.04)
IMM GRANULOCYTES NFR BLD AUTO: 0 % (ref 0–0.5)
LYMPHOCYTES # BLD: 1.2 K/UL (ref 0.8–3.5)
LYMPHOCYTES NFR BLD: 21 % (ref 12–49)
MCH RBC QN AUTO: 30.1 PG (ref 26–34)
MCHC RBC AUTO-ENTMCNC: 31.7 G/DL (ref 30–36.5)
MCV RBC AUTO: 95.1 FL (ref 80–99)
MONOCYTES # BLD: 0.4 K/UL (ref 0–1)
MONOCYTES NFR BLD: 7 % (ref 5–13)
NEUTS SEG # BLD: 4.2 K/UL (ref 1.8–8)
NEUTS SEG NFR BLD: 71 % (ref 32–75)
NRBC # BLD: 0 K/UL (ref 0–0.01)
NRBC BLD-RTO: 0 PER 100 WBC
PLATELET # BLD AUTO: 214 K/UL (ref 150–400)
PMV BLD AUTO: 11.7 FL (ref 8.9–12.9)
RBC # BLD AUTO: 4.25 M/UL (ref 3.8–5.2)
WBC # BLD AUTO: 5.9 K/UL (ref 3.6–11)

## 2020-05-13 PROCEDURE — 81025 URINE PREGNANCY TEST: CPT

## 2020-05-13 PROCEDURE — 36415 COLL VENOUS BLD VENIPUNCTURE: CPT

## 2020-05-13 PROCEDURE — 86900 BLOOD TYPING SEROLOGIC ABO: CPT

## 2020-05-13 PROCEDURE — 85025 COMPLETE CBC W/AUTO DIFF WBC: CPT

## 2020-05-13 RX ORDER — IBUPROFEN 200 MG
200 CAPSULE ORAL AS NEEDED
COMMUNITY
End: 2020-05-27

## 2020-05-13 RX ORDER — LANOLIN ALCOHOL/MO/W.PET/CERES
1000 CREAM (GRAM) TOPICAL DAILY
COMMUNITY

## 2020-05-14 LAB
ABO + RH BLD: NORMAL
BLOOD GROUP ANTIBODIES SERPL: NORMAL
SPECIMEN EXP DATE BLD: NORMAL

## 2020-05-23 ENCOUNTER — HOSPITAL ENCOUNTER (OUTPATIENT)
Dept: PREADMISSION TESTING | Age: 40
Discharge: HOME | End: 2020-05-23
Payer: COMMERCIAL

## 2020-05-23 DIAGNOSIS — U07.1 COVID-19: ICD-10-CM

## 2020-05-23 PROCEDURE — 87635 SARS-COV-2 COVID-19 AMP PRB: CPT

## 2020-05-24 LAB — SARS-COV-2, COV2NT: NOT DETECTED

## 2020-05-26 NOTE — H&P
Pre-operative Evaluation / History & Physical    Sent From: Sent To: [facility]  [address]  [place], [address] [place] [address] Way  Phone: [phone] Fax: [phone]      Patient Information  Patient Name Sean Pedro Sex F    1980 Age 46yo   Address [address] [place] [address], [place] [address] Phone H: [phone]  M: [phone]   [address] ([facility])  ID: [id number]  Policy Suresh: PEYMAN [address] [place] [address] None recorded. 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  2020 total laparoscopic hysterectomy, Bilateral salpingectomy d/t abnormal uterine bleeding, Dr Adolfo Miles to assist.  No complaints, continued pain in pelvis. Endometrial biopsy to be done today. Past Medical History Discussed Past Medical History  Other: N - MRSA Vaginitis 2014 Recurrent BV  Hypertension (high blood pressure): N - HTN - on HCTZ  Migraines: N - no aura   Surgical History Reviewed Surgical History     Hysteroscopy - polypectomy with myosure, d&c, novasure ablation- Dr Ledy Andrade Foot/toes surgery procedure   Tubal Ligation - 2007 - bilateral   Gynecological / Obstetrical History Reviewed GYN History  Date of LMP: 2020 (Notes: s/p ablation). Frequency of Cycle (Q days): (Notes: irregular). Duration of Flow (days): 2.  Flow: Light (Notes: very light). Menses Monthly: Y. Menstrual Cramps: severe. Date of Last Pap Smear: 01/10/2020 (Notes: 1/10/20- NIL, HPV (-) 1/26/15-nil). Date of HPV testin/10/2020 (Notes: 1/10/20- negative 1/26/15-neg). HPV testing results: Negative. Abnormal Pap: N. Age at Menarche: 17. HPV Vaccine: N. Sexual Orientation: Heterosexual.  Sexually Active?: Y.   Sexual Problems?: N.  STIs/STDs: Y (Notes: Trichomonas ). Current Birth Control Method: Tubal Ligation (Notes: BTL). Endometriosis: N. Fibroids: N. Infertility: N. Ovarian Cyst: N. PCOS: N.   Social History Discussed Social History  OB/GYN Social History  Chewing tobacco: none  Vaping/e-cigarette use?: Never used  Tobacco Smoking Status: Never smoker  Non-smoker  Smokeless Tobacco Status: Never used smokeless tobacco  Tobacco-years of use: 0  E-cigarette/Vape Status: Never used electronic cigarettes  Most Recent Tobacco Use Screenin2019  Marital status:   Do you feel safe in your current relationship?: Y  Sexual orientation?: Straight or heterosexual  Number of children: 2  Are you working: Yes  Occupation: Member   How often do you have a DRINK containing ALCOHOL?: Monthly or less (Notes: socially)  Have you recently (within the last 12 weeks, or during a current pregnancy) traveled to or lived in a Zika-affected area?: N  Do you have symptoms associated with Zika virus (fever, rash, joint pain, or conjunctivitis)?: N  Are you currently sexually active with anyone who has traveled (within the last 12 weeks) to a Zika-affected area?: N  Are you planning to conceive with someone who has traveled (within the last 12 weeks) to a Zika-affected area?: N  Is blood transfusion acceptable in an emergency?: Y   Family History Discussed Family History    Mother - Hypertensive disorder   Father - Hyperlipidemia   Paternal Grandmother - Malignant tumor of colon      Allergies List Reviewed Allergies     FLAGYL: Diarrhea (Severe)       Medications Reviewed Medications     B12 19   entered    hydroCHLOROthiazide 12.5 mg capsule  Take 1 capsule(s) every day by oral route.  19   entered    One-A-Day Womens Formula 19   entered       Review of Systems Additionally reports: Except as noted in the HPI, the review of systems is negative for General, Breast, , Resp, GI, CV, Endo, MS, Psych and Heme. Vital Signs Ht: 5 ft 1 in (154.94 cm) 2020 02:37 pm Wt: 160 lbs With clothes (72.57 kg) 2020 02:38 pm BMI: 30.2 2020 02:38 pm   BP: 117/81 sitting L arm 2020 02:39 pm          Physical Exam Patient is a 42-year-old female. Constitutional: General Appearance: well developed and nourished and pleasant. Level of Distress: no acute distress. Ambulation: ambulating normally. Head: Head: normocephalic. Eyes: Extraocular Movements extraocular movements intact. Ears, Nose, Mouth, Throat: Ears normal hearing. Lungs / Chest: Respiratory effort: unlabored. Inspection / Palpation: no deformity, tenderness, or swelling. Auscultation: no wheezing or rales/crackles. Cardiovascular: Rate And Rhythm: regular. Heart Sounds: no murmurs. Extremities: no cyanosis or edema. Abdomen: Inspection and Palpation: no tenderness, guarding, masses, rebound tenderness, or CVA tenderness and soft and non-distended. Female : Chaperone: present. External genitalia: no lesions, rash, erythema, or genital warts. Vagina: no discharge, mass, or tenderness and moist mucosa and non-erythematous mucosa. Cervix: no discharge, inflammation, or cervical lesion. Uterus: midline, smooth, non-tender, no mass, and not enlarged. Adnexae: no adnexal mass or tenderness. Extremities: Extremities: no swelling or inflammation of extremities. Musculoskeletal System: General Musculoskeletal no joint, muscle, or bone tenderness. Skin: General Skin no rash or suspicious lesions. Neurologic: Gait and Station: normal gait. Sensation: grossly intact. Motor: grossly intact. Mental Status Exam: Orientation oriented to person, place, and time. Lab Results    Assessment and Plan 1. Dysmenorrhea -  46yo [id number] with dysmenorrhea. NILM/HPV neg 2020  Suspect multiple factors including post tubal ligation ablation syndrome and adenomyosis seen on ultrasound. Uterus [id number].    x2  Discussed post tubal ligation ablation syndrome and adenomyosis independently. Spend >25 minutes counseling patient. Discussed unclear which is causing her symptoms. Discussed treatment for both independently. For post tubal ligation ablation syndrome, treatment is hysterectomy. Discussed oral hormones, UFE, GnRH agonists, and hysterectomy for adenomyosis. Discussed LNG-IUD is not option given previous ablation. R/B of each option discussed. Patient desires definitive management with hysterectomy. Discussed minimally invasive TLH/BS with leaving bilateral ovaries given age. Risks discussed of bleeding, infection, injury to surrounding tissue, DVT, nerve injury, icu admission and death. All questions and concerns addressed. Endometrial biopsy performed today. 606/[id number] Pop Chambers consents signed today. Will proceed with surgery TLH/BS.   N94.6: Dysmenorrhea, unspecified   PREGNANCY TEST, URINE -      Specimen source: Urine   PATHOLOGY [id number] -      Specimen source: Endometrium  Source: endometrial biopsy     PREGNANCY TEST, URINE  ·  Result:    - HCG: negative    Return to Da Reeder MD for Surgery at [facility] (OP) on 05/27/2020 at 07:30 AM   Lizzie Han MD for Surgery at [facility] (OP) on 05/27/2020 at 07:30 AM   Kieran Chamberlain MD for Post Op Exam at [facility] on 06/12/2020 at 11:15 AM   Current Problems (Diagnoses) Reviewed Problems     Subacute and chronic vaginitis - Onset: 10/14/2016 - Entered By: Dagmar Amador MDSigned By: Dagmar Amador MD Description: Bacterial vaginosis chronic code: 616.10   Menorrhagia - Onset: 11/01/2017 - Entered By: Dagmar Amador MDSigned By: Dagmar Amador MD Description: Menorrhagia code: 80.4   Abnormal uterine bleeding - Onset: 07/23/2018 - Entered By: Dagmar Amador MD Description: Abnormal uterine bleeding code: 26.5   Family history of cancer of colon - Onset: 09/19/2017 - Entered By: Enrike Barger LPN - Team 1 MDLSigned By: Jose Melara FNP-BC Description: Family History of Colon Cancer code: V16.0   Surveillance of depot contraception done - Onset: 03/05/2018 - Entered By: Greg Pastrana RN - Team 1 MDLSigned By: Greg Pastrana RN - Team 1 MDL Description: Depo provera follow up code: M21.24   Gynecological examination abnormal - Onset: 03/24/2016 - Entered By: Spike Lennon MDSigned By: Spike Lennon MD Description: Routine GYN with problems code: V72.31    5/27/2020 730am [facility] main/total laparoscopic hysterectomy, Bilateral salpingectomy/[place] with Guerita to assist  SUTURE REMOVAL/DRESSING CHANGE (V58.3) ; OnsetDate: 11/01/2007; ResolvedDate: 11/01/2007; SUTURE REMOVAL/DRESSING CHANGE  SUTURE REMOVAL/DRESSING CHANGE (V58.3) ; OnsetDate: 01/23/2007;  ResolvedDate: 01/24/2018; SUTURE REMOVAL/DRESSING CHANGE         Electronically Signed by: Haris Daly MD    _____________________________________________  Ordered/Documented by:  Visit Date: 05/14/2020

## 2020-05-27 ENCOUNTER — HOSPITAL ENCOUNTER (OUTPATIENT)
Age: 40
Setting detail: OUTPATIENT SURGERY
Discharge: HOME | End: 2020-05-27
Attending: OBSTETRICS & GYNECOLOGY | Admitting: OBSTETRICS & GYNECOLOGY
Payer: COMMERCIAL

## 2020-05-27 ENCOUNTER — ANESTHESIA EVENT (OUTPATIENT)
Dept: SURGERY | Age: 40
End: 2020-05-27
Payer: COMMERCIAL

## 2020-05-27 ENCOUNTER — ANESTHESIA (OUTPATIENT)
Dept: SURGERY | Age: 40
End: 2020-05-27
Payer: COMMERCIAL

## 2020-05-27 VITALS
HEART RATE: 77 BPM | HEIGHT: 60 IN | BODY MASS INDEX: 31.02 KG/M2 | DIASTOLIC BLOOD PRESSURE: 82 MMHG | SYSTOLIC BLOOD PRESSURE: 148 MMHG | TEMPERATURE: 97.6 F | OXYGEN SATURATION: 99 % | WEIGHT: 158 LBS | RESPIRATION RATE: 6 BRPM

## 2020-05-27 DIAGNOSIS — Z90.710 S/P HYSTERECTOMY: Primary | ICD-10-CM

## 2020-05-27 LAB
HCG UR QL: NEGATIVE
HGB BLD-MCNC: 12.9 G/DL (ref 11.5–16)

## 2020-05-27 PROCEDURE — 77030018778 HC MANIP UTER VCAR CNMD -B: Performed by: OBSTETRICS & GYNECOLOGY

## 2020-05-27 PROCEDURE — 74011000250 HC RX REV CODE- 250: Performed by: NURSE ANESTHETIST, CERTIFIED REGISTERED

## 2020-05-27 PROCEDURE — 77030013079 HC BLNKT BAIR HGGR 3M -A: Performed by: ANESTHESIOLOGY

## 2020-05-27 PROCEDURE — 74011250636 HC RX REV CODE- 250/636: Performed by: NURSE ANESTHETIST, CERTIFIED REGISTERED

## 2020-05-27 PROCEDURE — 77030016151 HC PROTCTR LNS DFOG COVD -B: Performed by: OBSTETRICS & GYNECOLOGY

## 2020-05-27 PROCEDURE — 77030011640 HC PAD GRND REM COVD -A: Performed by: OBSTETRICS & GYNECOLOGY

## 2020-05-27 PROCEDURE — 77030040922 HC BLNKT HYPOTHRM STRY -A

## 2020-05-27 PROCEDURE — 76210000001 HC OR PH I REC 2.5 TO 3 HR: Performed by: OBSTETRICS & GYNECOLOGY

## 2020-05-27 PROCEDURE — 77030037032 HC INSRT SCIS CLICKLLINE DISP STOR -B: Performed by: OBSTETRICS & GYNECOLOGY

## 2020-05-27 PROCEDURE — 74011000250 HC RX REV CODE- 250: Performed by: OBSTETRICS & GYNECOLOGY

## 2020-05-27 PROCEDURE — 74011250637 HC RX REV CODE- 250/637: Performed by: ANESTHESIOLOGY

## 2020-05-27 PROCEDURE — 77030040361 HC SLV COMPR DVT MDII -B: Performed by: OBSTETRICS & GYNECOLOGY

## 2020-05-27 PROCEDURE — 85018 HEMOGLOBIN: CPT

## 2020-05-27 PROCEDURE — 74011250636 HC RX REV CODE- 250/636: Performed by: OBSTETRICS & GYNECOLOGY

## 2020-05-27 PROCEDURE — 77030010507 HC ADH SKN DERMBND J&J -B: Performed by: OBSTETRICS & GYNECOLOGY

## 2020-05-27 PROCEDURE — 77030020053 HC ELECTRD LAPSCP COVD -B: Performed by: OBSTETRICS & GYNECOLOGY

## 2020-05-27 PROCEDURE — 77030022704 HC SUT VLOC COVD -B: Performed by: OBSTETRICS & GYNECOLOGY

## 2020-05-27 PROCEDURE — 77030018684: Performed by: OBSTETRICS & GYNECOLOGY

## 2020-05-27 PROCEDURE — 81025 URINE PREGNANCY TEST: CPT

## 2020-05-27 PROCEDURE — 77030018836 HC SOL IRR NACL ICUM -A: Performed by: OBSTETRICS & GYNECOLOGY

## 2020-05-27 PROCEDURE — 77030003578 HC NDL INSUF VERES AMR -B: Performed by: OBSTETRICS & GYNECOLOGY

## 2020-05-27 PROCEDURE — 77030010031 HC SCIS ENDOSC MPLR J&J -C: Performed by: OBSTETRICS & GYNECOLOGY

## 2020-05-27 PROCEDURE — 76060000036 HC ANESTHESIA 2.5 TO 3 HR: Performed by: OBSTETRICS & GYNECOLOGY

## 2020-05-27 PROCEDURE — 77030040830 HC CATH URETH FOL MDII -A: Performed by: OBSTETRICS & GYNECOLOGY

## 2020-05-27 PROCEDURE — 77030008684 HC TU ET CUF COVD -B: Performed by: ANESTHESIOLOGY

## 2020-05-27 PROCEDURE — 74011250637 HC RX REV CODE- 250/637: Performed by: OBSTETRICS & GYNECOLOGY

## 2020-05-27 PROCEDURE — 76010000132 HC OR TIME 2.5 TO 3 HR: Performed by: OBSTETRICS & GYNECOLOGY

## 2020-05-27 PROCEDURE — 76210000021 HC REC RM PH II 0.5 TO 1 HR: Performed by: OBSTETRICS & GYNECOLOGY

## 2020-05-27 PROCEDURE — 77030002933 HC SUT MCRYL J&J -A: Performed by: OBSTETRICS & GYNECOLOGY

## 2020-05-27 PROCEDURE — 77030026438 HC STYL ET INTUB CARD -A: Performed by: ANESTHESIOLOGY

## 2020-05-27 PROCEDURE — 77030034628 HC LIGASURE LAP SEAL DIV MD COVD -F: Performed by: OBSTETRICS & GYNECOLOGY

## 2020-05-27 PROCEDURE — 77030019908 HC STETH ESOPH SIMS -A: Performed by: ANESTHESIOLOGY

## 2020-05-27 PROCEDURE — 88307 TISSUE EXAM BY PATHOLOGIST: CPT

## 2020-05-27 PROCEDURE — 74011250636 HC RX REV CODE- 250/636: Performed by: ANESTHESIOLOGY

## 2020-05-27 PROCEDURE — 77030008756 HC TU IRR SUC STRY -B: Performed by: OBSTETRICS & GYNECOLOGY

## 2020-05-27 PROCEDURE — 77030008606 HC TRCR ENDOSC KII AMR -B: Performed by: OBSTETRICS & GYNECOLOGY

## 2020-05-27 RX ORDER — SODIUM CHLORIDE 0.9 % (FLUSH) 0.9 %
5-40 SYRINGE (ML) INJECTION AS NEEDED
Status: DISCONTINUED | OUTPATIENT
Start: 2020-05-27 | End: 2020-05-27 | Stop reason: HOSPADM

## 2020-05-27 RX ORDER — DEXAMETHASONE SODIUM PHOSPHATE 4 MG/ML
INJECTION, SOLUTION INTRA-ARTICULAR; INTRALESIONAL; INTRAMUSCULAR; INTRAVENOUS; SOFT TISSUE AS NEEDED
Status: DISCONTINUED | OUTPATIENT
Start: 2020-05-27 | End: 2020-05-27 | Stop reason: HOSPADM

## 2020-05-27 RX ORDER — OXYCODONE HYDROCHLORIDE 5 MG/1
5 TABLET ORAL
Qty: 12 TAB | Refills: 0 | Status: SHIPPED | OUTPATIENT
Start: 2020-05-27 | End: 2020-05-30

## 2020-05-27 RX ORDER — IBUPROFEN 800 MG/1
800 TABLET ORAL
Qty: 60 TAB | Refills: 1 | Status: SHIPPED | OUTPATIENT
Start: 2020-05-27 | End: 2022-02-04 | Stop reason: DRUGHIGH

## 2020-05-27 RX ORDER — BUPIVACAINE HYDROCHLORIDE AND EPINEPHRINE 2.5; 5 MG/ML; UG/ML
INJECTION, SOLUTION EPIDURAL; INFILTRATION; INTRACAUDAL; PERINEURAL AS NEEDED
Status: DISCONTINUED | OUTPATIENT
Start: 2020-05-27 | End: 2020-05-27 | Stop reason: HOSPADM

## 2020-05-27 RX ORDER — SODIUM CHLORIDE 0.9 % (FLUSH) 0.9 %
5-40 SYRINGE (ML) INJECTION EVERY 8 HOURS
Status: DISCONTINUED | OUTPATIENT
Start: 2020-05-27 | End: 2020-05-27 | Stop reason: HOSPADM

## 2020-05-27 RX ORDER — SODIUM CHLORIDE, SODIUM LACTATE, POTASSIUM CHLORIDE, CALCIUM CHLORIDE 600; 310; 30; 20 MG/100ML; MG/100ML; MG/100ML; MG/100ML
INJECTION, SOLUTION INTRAVENOUS
Status: DISCONTINUED | OUTPATIENT
Start: 2020-05-27 | End: 2020-05-27 | Stop reason: HOSPADM

## 2020-05-27 RX ORDER — OXYCODONE HYDROCHLORIDE 5 MG/1
5 TABLET ORAL ONCE
Status: COMPLETED | OUTPATIENT
Start: 2020-05-27 | End: 2020-05-27

## 2020-05-27 RX ORDER — SODIUM CHLORIDE, SODIUM LACTATE, POTASSIUM CHLORIDE, CALCIUM CHLORIDE 600; 310; 30; 20 MG/100ML; MG/100ML; MG/100ML; MG/100ML
25 INJECTION, SOLUTION INTRAVENOUS CONTINUOUS
Status: DISCONTINUED | OUTPATIENT
Start: 2020-05-27 | End: 2020-05-27 | Stop reason: HOSPADM

## 2020-05-27 RX ORDER — SUCCINYLCHOLINE CHLORIDE 20 MG/ML
INJECTION INTRAMUSCULAR; INTRAVENOUS AS NEEDED
Status: DISCONTINUED | OUTPATIENT
Start: 2020-05-27 | End: 2020-05-27 | Stop reason: HOSPADM

## 2020-05-27 RX ORDER — PHENYLEPHRINE HCL IN 0.9% NACL 0.4MG/10ML
SYRINGE (ML) INTRAVENOUS AS NEEDED
Status: DISCONTINUED | OUTPATIENT
Start: 2020-05-27 | End: 2020-05-27 | Stop reason: HOSPADM

## 2020-05-27 RX ORDER — LIDOCAINE HYDROCHLORIDE 10 MG/ML
0.1 INJECTION, SOLUTION EPIDURAL; INFILTRATION; INTRACAUDAL; PERINEURAL AS NEEDED
Status: DISCONTINUED | OUTPATIENT
Start: 2020-05-27 | End: 2020-05-27 | Stop reason: HOSPADM

## 2020-05-27 RX ORDER — CEFAZOLIN SODIUM/WATER 2 G/20 ML
2 SYRINGE (ML) INTRAVENOUS ONCE
Status: COMPLETED | OUTPATIENT
Start: 2020-05-27 | End: 2020-05-27

## 2020-05-27 RX ORDER — GLYCOPYRROLATE 0.2 MG/ML
INJECTION INTRAMUSCULAR; INTRAVENOUS AS NEEDED
Status: DISCONTINUED | OUTPATIENT
Start: 2020-05-27 | End: 2020-05-27 | Stop reason: HOSPADM

## 2020-05-27 RX ORDER — MIDAZOLAM HYDROCHLORIDE 1 MG/ML
0.5 INJECTION, SOLUTION INTRAMUSCULAR; INTRAVENOUS
Status: DISCONTINUED | OUTPATIENT
Start: 2020-05-27 | End: 2020-05-27 | Stop reason: HOSPADM

## 2020-05-27 RX ORDER — ONDANSETRON 2 MG/ML
INJECTION INTRAMUSCULAR; INTRAVENOUS AS NEEDED
Status: DISCONTINUED | OUTPATIENT
Start: 2020-05-27 | End: 2020-05-27 | Stop reason: HOSPADM

## 2020-05-27 RX ORDER — FENTANYL CITRATE 50 UG/ML
25 INJECTION, SOLUTION INTRAMUSCULAR; INTRAVENOUS
Status: DISCONTINUED | OUTPATIENT
Start: 2020-05-27 | End: 2020-05-27 | Stop reason: HOSPADM

## 2020-05-27 RX ORDER — DIPHENHYDRAMINE HYDROCHLORIDE 50 MG/ML
12.5 INJECTION, SOLUTION INTRAMUSCULAR; INTRAVENOUS AS NEEDED
Status: DISCONTINUED | OUTPATIENT
Start: 2020-05-27 | End: 2020-05-27 | Stop reason: HOSPADM

## 2020-05-27 RX ORDER — FENTANYL CITRATE 50 UG/ML
INJECTION, SOLUTION INTRAMUSCULAR; INTRAVENOUS AS NEEDED
Status: DISCONTINUED | OUTPATIENT
Start: 2020-05-27 | End: 2020-05-27 | Stop reason: HOSPADM

## 2020-05-27 RX ORDER — SODIUM CHLORIDE, SODIUM LACTATE, POTASSIUM CHLORIDE, CALCIUM CHLORIDE 600; 310; 30; 20 MG/100ML; MG/100ML; MG/100ML; MG/100ML
125 INJECTION, SOLUTION INTRAVENOUS CONTINUOUS
Status: DISCONTINUED | OUTPATIENT
Start: 2020-05-27 | End: 2020-05-27 | Stop reason: HOSPADM

## 2020-05-27 RX ORDER — MIDAZOLAM HYDROCHLORIDE 1 MG/ML
INJECTION, SOLUTION INTRAMUSCULAR; INTRAVENOUS AS NEEDED
Status: DISCONTINUED | OUTPATIENT
Start: 2020-05-27 | End: 2020-05-27 | Stop reason: HOSPADM

## 2020-05-27 RX ORDER — LIDOCAINE HYDROCHLORIDE 20 MG/ML
INJECTION, SOLUTION EPIDURAL; INFILTRATION; INTRACAUDAL; PERINEURAL AS NEEDED
Status: DISCONTINUED | OUTPATIENT
Start: 2020-05-27 | End: 2020-05-27 | Stop reason: HOSPADM

## 2020-05-27 RX ORDER — SODIUM CHLORIDE 9 MG/ML
25 INJECTION, SOLUTION INTRAVENOUS CONTINUOUS
Status: DISCONTINUED | OUTPATIENT
Start: 2020-05-27 | End: 2020-05-27 | Stop reason: HOSPADM

## 2020-05-27 RX ORDER — HYDROMORPHONE HYDROCHLORIDE 1 MG/ML
0.2 INJECTION, SOLUTION INTRAMUSCULAR; INTRAVENOUS; SUBCUTANEOUS
Status: DISCONTINUED | OUTPATIENT
Start: 2020-05-27 | End: 2020-05-27 | Stop reason: HOSPADM

## 2020-05-27 RX ORDER — NEOSTIGMINE METHYLSULFATE 1 MG/ML
INJECTION, SOLUTION INTRAVENOUS AS NEEDED
Status: DISCONTINUED | OUTPATIENT
Start: 2020-05-27 | End: 2020-05-27 | Stop reason: HOSPADM

## 2020-05-27 RX ORDER — MIDAZOLAM HYDROCHLORIDE 1 MG/ML
1 INJECTION, SOLUTION INTRAMUSCULAR; INTRAVENOUS AS NEEDED
Status: DISCONTINUED | OUTPATIENT
Start: 2020-05-27 | End: 2020-05-27 | Stop reason: HOSPADM

## 2020-05-27 RX ORDER — PROPOFOL 10 MG/ML
INJECTION, EMULSION INTRAVENOUS AS NEEDED
Status: DISCONTINUED | OUTPATIENT
Start: 2020-05-27 | End: 2020-05-27 | Stop reason: HOSPADM

## 2020-05-27 RX ORDER — HYDROMORPHONE HYDROCHLORIDE 2 MG/ML
INJECTION, SOLUTION INTRAMUSCULAR; INTRAVENOUS; SUBCUTANEOUS AS NEEDED
Status: DISCONTINUED | OUTPATIENT
Start: 2020-05-27 | End: 2020-05-27 | Stop reason: HOSPADM

## 2020-05-27 RX ORDER — SODIUM CHLORIDE, SODIUM LACTATE, POTASSIUM CHLORIDE, CALCIUM CHLORIDE 600; 310; 30; 20 MG/100ML; MG/100ML; MG/100ML; MG/100ML
75 INJECTION, SOLUTION INTRAVENOUS CONTINUOUS
Status: DISCONTINUED | OUTPATIENT
Start: 2020-05-27 | End: 2020-05-27 | Stop reason: HOSPADM

## 2020-05-27 RX ORDER — ACETAMINOPHEN 325 MG/1
650 TABLET ORAL ONCE
Status: COMPLETED | OUTPATIENT
Start: 2020-05-27 | End: 2020-05-27

## 2020-05-27 RX ORDER — FENTANYL CITRATE 50 UG/ML
50 INJECTION, SOLUTION INTRAMUSCULAR; INTRAVENOUS AS NEEDED
Status: DISCONTINUED | OUTPATIENT
Start: 2020-05-27 | End: 2020-05-27 | Stop reason: HOSPADM

## 2020-05-27 RX ORDER — ONDANSETRON 2 MG/ML
4 INJECTION INTRAMUSCULAR; INTRAVENOUS AS NEEDED
Status: DISCONTINUED | OUTPATIENT
Start: 2020-05-27 | End: 2020-05-27 | Stop reason: HOSPADM

## 2020-05-27 RX ORDER — MORPHINE SULFATE 10 MG/ML
2 INJECTION, SOLUTION INTRAMUSCULAR; INTRAVENOUS
Status: DISCONTINUED | OUTPATIENT
Start: 2020-05-27 | End: 2020-05-27 | Stop reason: HOSPADM

## 2020-05-27 RX ORDER — ACETAMINOPHEN 500 MG
500 TABLET ORAL
Qty: 60 TAB | Refills: 1 | Status: SHIPPED | OUTPATIENT
Start: 2020-05-27

## 2020-05-27 RX ORDER — ROPIVACAINE HYDROCHLORIDE 5 MG/ML
30 INJECTION, SOLUTION EPIDURAL; INFILTRATION; PERINEURAL ONCE
Status: DISCONTINUED | OUTPATIENT
Start: 2020-05-27 | End: 2020-05-27 | Stop reason: HOSPADM

## 2020-05-27 RX ORDER — ROCURONIUM BROMIDE 10 MG/ML
INJECTION, SOLUTION INTRAVENOUS AS NEEDED
Status: DISCONTINUED | OUTPATIENT
Start: 2020-05-27 | End: 2020-05-27 | Stop reason: HOSPADM

## 2020-05-27 RX ORDER — KETAMINE HYDROCHLORIDE 10 MG/ML
INJECTION, SOLUTION INTRAMUSCULAR; INTRAVENOUS AS NEEDED
Status: DISCONTINUED | OUTPATIENT
Start: 2020-05-27 | End: 2020-05-27 | Stop reason: HOSPADM

## 2020-05-27 RX ADMIN — FENTANYL CITRATE 50 MCG: 50 INJECTION, SOLUTION INTRAMUSCULAR; INTRAVENOUS at 08:03

## 2020-05-27 RX ADMIN — HYDROMORPHONE HYDROCHLORIDE 0.5 MG: 2 INJECTION, SOLUTION INTRAMUSCULAR; INTRAVENOUS; SUBCUTANEOUS at 09:40

## 2020-05-27 RX ADMIN — Medication 2.5 MG: at 09:40

## 2020-05-27 RX ADMIN — ROCURONIUM BROMIDE 40 MG: 10 SOLUTION INTRAVENOUS at 07:37

## 2020-05-27 RX ADMIN — KETAMINE HYDROCHLORIDE 25 MG: 10 INJECTION, SOLUTION INTRAMUSCULAR; INTRAVENOUS at 07:34

## 2020-05-27 RX ADMIN — Medication 2 G: at 07:51

## 2020-05-27 RX ADMIN — DEXAMETHASONE SODIUM PHOSPHATE 4 MG: 4 INJECTION, SOLUTION INTRAMUSCULAR; INTRAVENOUS at 07:45

## 2020-05-27 RX ADMIN — FENTANYL CITRATE 50 MCG: 50 INJECTION, SOLUTION INTRAMUSCULAR; INTRAVENOUS at 07:34

## 2020-05-27 RX ADMIN — ONDANSETRON 4 MG: 2 INJECTION INTRAMUSCULAR; INTRAVENOUS at 10:55

## 2020-05-27 RX ADMIN — SODIUM CHLORIDE, POTASSIUM CHLORIDE, SODIUM LACTATE AND CALCIUM CHLORIDE: 600; 310; 30; 20 INJECTION, SOLUTION INTRAVENOUS at 07:25

## 2020-05-27 RX ADMIN — MIDAZOLAM HYDROCHLORIDE 2 MG: 1 INJECTION, SOLUTION INTRAMUSCULAR; INTRAVENOUS at 07:30

## 2020-05-27 RX ADMIN — FENTANYL CITRATE 25 MCG: 50 INJECTION, SOLUTION INTRAMUSCULAR; INTRAVENOUS at 10:55

## 2020-05-27 RX ADMIN — ONDANSETRON HYDROCHLORIDE 4 MG: 2 INJECTION, SOLUTION INTRAMUSCULAR; INTRAVENOUS at 07:45

## 2020-05-27 RX ADMIN — MIDAZOLAM HYDROCHLORIDE 3 MG: 1 INJECTION, SOLUTION INTRAMUSCULAR; INTRAVENOUS at 07:26

## 2020-05-27 RX ADMIN — SUCCINYLCHOLINE CHLORIDE 160 MG: 20 INJECTION, SOLUTION INTRAMUSCULAR; INTRAVENOUS at 07:34

## 2020-05-27 RX ADMIN — ACETAMINOPHEN 650 MG: 325 TABLET ORAL at 07:20

## 2020-05-27 RX ADMIN — Medication 80 MCG: at 09:41

## 2020-05-27 RX ADMIN — ROCURONIUM BROMIDE 10 MG: 10 SOLUTION INTRAVENOUS at 07:34

## 2020-05-27 RX ADMIN — FENTANYL CITRATE 25 MCG: 50 INJECTION, SOLUTION INTRAMUSCULAR; INTRAVENOUS at 11:20

## 2020-05-27 RX ADMIN — FENTANYL CITRATE 25 MCG: 50 INJECTION, SOLUTION INTRAMUSCULAR; INTRAVENOUS at 11:00

## 2020-05-27 RX ADMIN — OXYCODONE 5 MG: 5 TABLET ORAL at 11:34

## 2020-05-27 RX ADMIN — HYDROMORPHONE HYDROCHLORIDE 0.5 MG: 2 INJECTION, SOLUTION INTRAMUSCULAR; INTRAVENOUS; SUBCUTANEOUS at 08:03

## 2020-05-27 RX ADMIN — LIDOCAINE HYDROCHLORIDE 50 MG: 20 INJECTION, SOLUTION EPIDURAL; INFILTRATION; INTRACAUDAL; PERINEURAL at 07:34

## 2020-05-27 RX ADMIN — GLYCOPYRROLATE 0.4 MG: 0.2 INJECTION, SOLUTION INTRAMUSCULAR; INTRAVENOUS at 09:40

## 2020-05-27 RX ADMIN — PROPOFOL 150 MG: 10 INJECTION, EMULSION INTRAVENOUS at 07:34

## 2020-05-27 NOTE — BRIEF OP NOTE
Brief Postoperative Note    Patient: Tamiko Galindo  YOB: 1980  MRN: [id number]    Date of Procedure: 5/27/2020     Pre-Op Diagnosis: POST TUBAL LIGATION ABLATION SYNDROME, ADENOMYOSIS    Post-Op Diagnosis: Same as preoperative diagnosis. Procedure(s):  TOTAL LAPAROSCOPIC HYSTERECTOMY/BILATERAL SALPINGECTOMY  (CHOICE)    Surgeon(s):  MD Jose Angel Conway MD    Surgical Assistant: None    Anesthesia: General     Estimated Blood Loss (mL): 10    Complications: None    Specimens:   ID Type Source Tests Collected by Time Destination   1 : UTERUS, CERVIX, BILATERAL FALLOPIAN TUBES Fresh Uterus with Bilateral Fallopian Tubes  Edmar Acosta MD 5/27/2020 0940 Pathology        Implants: * No implants in log *    Drains: * No LDAs found *    Findings: Normal appearing uterus <250g, fallopian tubes s/p surgical sterilization, normal appearing bilateral ovaries. Normal appearing liver edge, no evidence of endometriosis.     Electronically Signed by Ehsan Burns MD on 5/27/2020 at 10:07 AM

## 2020-05-27 NOTE — ANESTHESIA POSTPROCEDURE EVALUATION
Post-Anesthesia Evaluation and Assessment    Patient: Angela Andrade MRN: [id number]  SSN: [id number]    YOB: 1980  Age: 42 y.o. Sex: female      I have evaluated the patient and they are stable and ready for discharge from the PACU. Cardiovascular Function/Vital Signs  Visit Vitals  /88   Pulse 64   Temp 36.4 °C (97.6 °F)   Resp (!) 96   Ht 5' (1.524 m)   Wt 71.7 kg (158 lb)   SpO2 97%   BMI 30.86 kg/m²       Patient is status post General anesthesia for Procedure(s):  TOTAL LAPAROSCOPIC HYSTERECTOMY/BILATERAL SALPINGECTOMY  (CHOICE). Nausea/Vomiting: None    Postoperative hydration reviewed and adequate. Pain:  Pain Scale 1: Visual (05/27/20 1010)  Pain Intensity 1: 0 (05/27/20 1010)   Managed    Neurological Status:   Neuro (WDL): Exceptions to WDL (05/27/20 1010)  Neuro  Neurologic State: Sleeping (05/27/20 1010)  LUE Motor Response: Spontaneous  (05/27/20 1010)  RUE Motor Response: Spontaneous  (05/27/20 1010)   At baseline    Mental Status, Level of Consciousness: Alert and  oriented to person, place, and time    Pulmonary Status:   O2 Device: Room air (05/27/20 1010)   Adequate oxygenation and airway patent    Complications related to anesthesia: None    Post-anesthesia assessment completed. No concerns    Signed By: Kenisha Garduno MD     May 27, 2020              Procedure(s):  TOTAL LAPAROSCOPIC HYSTERECTOMY/BILATERAL SALPINGECTOMY  (CHOICE). general    <BSHSIANPOST>    INITIAL Post-op Vital signs:   Vitals Value Taken Time   /96 5/27/2020 10:20 AM   Temp 36.4 °C (97.6 °F) 5/27/2020 10:10 AM   Pulse 64 5/27/2020 10:25 AM   Resp 17 5/27/2020 10:25 AM   SpO2 97 % 5/27/2020 10:25 AM   Vitals shown include unvalidated device data.

## 2020-05-27 NOTE — DISCHARGE INSTRUCTIONS
** You were given a pain pill (Roxicdone 5mg) at 11:30 am in the recovery room. **    ** You were given Tylenol 650 mg at 7:20 am. **    ______________________________________________________________________    Anesthesia Discharge Instructions    After general anesthesia or intervenous sedation, for 24 hours or while taking prescription Narcotics:  · Limit your activities  · Do not drive or operate hazardous machinery  · If you have not urinated within 8 hours after discharge, please contact your surgeon on call. · Do not make important personal or business decisions  · Do not drink alcoholic beverages    Report the following to your surgeon:  · Excessive pain, swelling, redness or odor of or around the surgical area  · Temperature over 100.5 degrees  · Nausea and vomiting lasting longer than 4 hours or if unable to take medication  · Any signs of decreased circulation or nerve impairment to extremity:  Change in color, persistent numbness, tingling, coldness or increased pain. · Any questions              Laparoscopic Hysterectomy: What to Expect at [address] [place] [address]    A hysterectomy is surgery to take out the uterus. In some cases, the ovaries and fallopian tubes also are taken out at the same time. You can expect to feel better and stronger each day, but you may need pain medicine for a week or two. You may get tired easily or have less energy than usual. The tiredness may last for several weeks after surgery. You will probably notice that your belly is swollen and puffy. This is common. The swelling will take several weeks to go down. You may take about 4 to 6 weeks to fully recover. It is important to avoid lifting while you are recovering so that you can heal.  This care sheet gives you a general idea about how long it will take for you to recover. But each person recovers at a different pace. Follow the steps below to get better as quickly as possible.   How can you care for yourself at home?  Activity    · Rest when you feel tired.     · Be active. Walking is a good choice.     · Allow the area to heal. Don't move quickly or lift anything heavy until you are feeling better.     · You may shower 24 to 48 hours after surgery, if your doctor okays it. Pat the incision dry. Do not take a bath for the first 2 weeks, or until your doctor tells you it is okay.     · Ask your doctor when it is okay for you to have sex. Diet    · You can eat your normal diet. If your stomach is upset, try bland, low-fat foods like plain rice, broiled chicken, toast, and yogurt.     · If your bowel movements are not regular right after surgery, try to avoid constipation and straining. Drink plenty of water. Your doctor may suggest fiber, a stool softener, or a mild laxative. Medicines    · Your doctor will tell you if and when you can restart your medicines. He or she will also give you instructions about taking any new medicines.     · If you take aspirin or some other blood thinner, ask your doctor if and when to start taking it again. Make sure that you understand exactly what your doctor wants you to do.     · Be safe with medicines. Read and follow all instructions on the label. ? If the doctor gave you a prescription medicine for pain, take it as prescribed. ? If you are not taking a prescription pain medicine, ask your doctor if you can take an over-the-counter medicine.     · If your doctor prescribed antibiotics, take them as directed. Do not stop taking them just because you feel better. You need to take the full course of antibiotics. Incision care    · You may have stitches over the cuts (incisions) the doctor made in your belly.     · If you have strips of tape on the cut (incision) the doctor made, leave the tape on for a week or until it falls off.     · Wash the area daily with warm, soapy water, and pat it dry. Don't use hydrogen peroxide or alcohol.  They can slow healing.     · You may cover the area with a gauze bandage if it oozes fluid or rubs against clothing.     · Change the bandage every day. Other instructions    · You may have some light vaginal bleeding. Wear sanitary pads if needed. Do not douche or use tampons.     · Don't have sex until the doctor says it is okay. Follow-up care is a key part of your treatment and safety. Be sure to make and go to all appointments, and call your doctor if you are having problems. It's also a good idea to know your test results and keep a list of the medicines you take. When should you call for help? Call 911 anytime you think you may need emergency care. For example, call if:    · You passed out (lost consciousness).     · You have chest pain, are short of breath, or cough up blood.    Call your doctor now or seek immediate medical care if:    · You have pain that does not get better after you take pain medicine.     · You cannot pass stools or gas.     · You have vaginal discharge that has increased in amount or smells bad.     · You are sick to your stomach or cannot drink fluids.     · You have loose stitches, or your incision comes open.     · Bright red blood has soaked through the bandage over your incision.     · You have signs of infection, such as:  ? Increased pain, swelling, warmth, or redness. ? Red streaks leading from the incision. ? Pus draining from the incision. ? A fever.     · You have bright red vaginal bleeding that soaks one or more pads in an hour, or you have large clots.     · You have signs of a blood clot in your leg (called a deep vein thrombosis), such as:  ? Pain in your calf, back of the knee, thigh, or groin. ? Redness and swelling in your leg or groin.    Watch closely for changes in your health, and be sure to contact your doctor if you have any problems. Where can you learn more?   Go to http://vera-douglas.info/  Enter Q131 in the search box to learn more about \"Laparoscopic Hysterectomy: What to Expect at Home. \"  Current as of: November 7, 2019Content Version: 12.4  © 8273-6906 Healthwise, [facility]. Care instructions adapted under license by [employer] (which disclaims liability or warranty for this information). If you have questions about a medical condition or this instruction, always ask your healthcare professional. [address] any warranty or liability for your use of this information.              Patient Education   Learning About Coronavirus [phone])  Coronavirus [phone]): Overview  What is coronavirus (COVID-19)? The coronavirus disease (COVID-19) is caused by a virus. It is an illness that was first found in [place], [place], in December 2019. It has since spread worldwide. The virus can cause fever, cough, and trouble breathing. In severe cases, it can cause pneumonia and make it hard to breathe without help. It can cause death. Coronaviruses are a large group of viruses. They cause the common cold. They also cause more serious illnesses like Middle East respiratory syndrome (MERS) and severe acute respiratory syndrome (SARS). COVID-19 is caused by a novel coronavirus. That means it's a new type that has not been seen in people before. This virus spreads person-to-person through droplets from coughing and sneezing. It can also spread when you are close to someone who is infected. And it can spread when you touch something that has the virus on it, such as a doorknob or a tabletop. What can you do to protect yourself from coronavirus (COVID-19)? The best way to protect yourself from getting sick is to:  · Avoid areas where there is an outbreak. · Avoid contact with people who may be infected. · Wash your hands often with soap or alcohol-based hand sanitizers. · Avoid crowds and try to stay at least 6 feet away from other people. · Wash your hands often, especially after you cough or sneeze. Use soap and water, and scrub for at least 20 seconds.  If soap and water aren't available, use an alcohol-based hand . · Avoid touching your mouth, nose, and eyes. What can you do to avoid spreading the virus to others? To help avoid spreading the virus to others:  · Cover your mouth with a tissue when you cough or sneeze. Then throw the tissue in the trash. · Use a disinfectant to clean things that you touch often. · Stay home if you are sick or have been exposed to the virus. Don't go to school, work, or public areas. And don't use public transportation. · If you are sick:  ? Leave your home only if you need to get medical care. But call the doctor's office first so they know you're coming. And wear a face mask, if you have one.  ? If you have a face mask, wear it whenever you're around other people. It can help stop the spread of the virus when you cough or sneeze. ? Clean and disinfect your home every day. Use household  and disinfectant wipes or sprays. Take special care to clean things that you grab with your hands. These include doorknobs, remote controls, phones, and handles on your refrigerator and microwave. And don't forget countertops, tabletops, bathrooms, and computer keyboards. When to call for help  Call 911 anytime you think you may need emergency care. For example, call if:  · You have severe trouble breathing. (You can't talk at all.)  · You have constant chest pain or pressure. · You are severely dizzy or lightheaded. · You are confused or can't think clearly. · Your face and lips have a blue color. · You pass out (lose consciousness) or are very hard to wake up. Call your doctor now if you develop symptoms such as:  · Shortness of breath. · Fever. · Cough. If you need to get care, call ahead to the doctor's office for instructions before you go. Make sure you wear a face mask, if you have one, to prevent exposing other people to the virus. Where can you get the latest information?   The following health organizations are tracking and studying this virus. Their websites contain the most up-to-date information. Nino Taylor also learn what to do if you think you may have been exposed to the virus. · U.S. Centers for Disease Control and Prevention (CDC): The CDC provides updated news about the disease and travel advice. The website also tells you how to prevent the spread of infection. www.cdc.gov  · World Health Organization [facility]): WHO offers information about the virus outbreaks. WHO also has travel advice. www.who.int  Current as of: April 1, 2020               Content Version: 12.4  © 4209-9370 Healthwise[employer]. Care instructions adapted under license by your healthcare professional. If you have questions about a medical condition or this instruction, always ask your healthcare professional. [address] any warranty or liability for your use of this information.

## 2020-05-27 NOTE — INTERVAL H&P NOTE
Update History & Physical    The Patient's History and Physical of May 14,   2020 was reviewed with the patient and I examined the patient. There was no change. The surgical site was confirmed by the patient and me. Plan:  The risk, benefits, expected outcome, and alternative to the recommended procedure have been discussed with the patient. Patient understands and wants to proceed with the procedure. The patient was counseled at length about the risks of sue Covid-19 during their perioperative period and any recovery window from their procedure. The patient was made aware that sue Covid-19  may worsen their prognosis for recovering from their procedure and lend to a higher morbidity and/or mortality risk. All material risks, benefits, and reasonable alternatives including postponing the procedure were discussed. The patient does  wish to proceed with the procedure at this time. Patient plans for same day discharge.           Electronically signed by Doneen Hashimoto, MD on 5/27/2020 at 7:11 AM

## 2020-05-27 NOTE — ANESTHESIA PREPROCEDURE EVALUATION
Relevant Problems   No relevant active problems       Anesthetic History   No history of anesthetic complications            Review of Systems / Medical History  Patient summary reviewed, nursing notes reviewed and pertinent labs reviewed    Pulmonary  Within defined limits                 Neuro/Psych   Within defined limits           Cardiovascular  Within defined limits  Hypertension: well controlled                   GI/Hepatic/Renal  Within defined limits              Endo/Other  Within defined limits           Other Findings              Physical Exam    Airway  Mallampati: I  TM Distance: > 6 cm  Neck ROM: normal range of motion   Mouth opening: Normal     Cardiovascular  Regular rate and rhythm,  S1 and S2 normal,  no murmur, click, rub, or gallop             Dental  No notable dental hx       Pulmonary  Breath sounds clear to auscultation               Abdominal  GI exam deferred       Other Findings            Anesthetic Plan    ASA: 2  Anesthesia type: general          Induction: Intravenous  Anesthetic plan and risks discussed with: Patient

## 2020-05-27 NOTE — OP NOTES
Operative Note    Patient ID:   Name: Nayeli Marroquin    Medical Record Number: [id number]    YOB: 1980    Preoperative Diagnosis: POST TUBAL LIGATION ABLATION SYNDROME, ADENOMYOSIS    Postoperative Diagnosis: POST TUBAL LIGATION ABLATION SYNDROME, ADENOMYOSIS    Surgeon:  Baltazar Mcknight MD     Assistant:  Loreto Peguero MD    Anesthesia: General    Procedure: Total Laparoscopic Hysterectomy with bilateral  Salpingectomy less than 250 grams    Findings: normal uterus and normal BSO    Estimated Blood Loss: 10cc    Drains: none    Pathology /Specimens:     ID Type Source Tests Collected by Time Destination   1 : UTERUS, CERVIX, BILATERAL FALLOPIAN TUBES Fresh Uterus with Bilateral Fallopian Tubes  Gene Thompson MD 5/27/2020 0940 Pathology       DVT Prophylaxis: SCD Hose    Complications: none    Antibiotic Prophylaxis: Ancef      PROCEDURE IN DETAIL: The patient was taken to the operating room and placed on the OR  table and given general anesthesia. She was then placed in lithotomy position and  prepped and draped in a sterile fashion. Then, a time-out was performed. Khan was placed followed by a sterile speculum that was inserted into the vagina. A  single-tooth tenaculum was used to grasp the anterior lip of the cervix and the uterus  was sounded to 8 cm. A large VCare uterine manipulator was then placed and the balloon  was inflated. Attention was then turned to the abdomen where 0.25% marcaine with epinephrine was injected at the umbilicus. A 11blade was used to create a 5mm incision at the site of the previous incision. Then, a Veress was used to obtain abdominal  entry at the umbilicus and insufflation was performed. A 5-mm port was placed using a Visiport confirming  abdominal entry. Abdominal survey performed revealing no injury to bowel or omentum below.   Bilateral 5 mm lower quadrant ports with an additional right lateral 5mm port were then placed and diagnostic laparoscopy was performed with the above findings noted. Bilateral ureters identified. Blunt graspers and LigaSure  were used to dissect bilateral fallopian tubes away from the mesosalpinx. Fallopian tubes were removed. Then the utero-  ovarian artery was burned and ligated followed by dissection of the round ligament with  the LigaSure device. This was repeated on the other side. The anterior leaf of the  broad was then dissected and a bladder flap was created. The posterior leaf of the broad was then dissected. Bilateral uterine arteries were  then skeletonized. These were cauterized multiple times and then transected. Parallel  bites of the cardinal ligaments were then made to further lateralize the pedicle and this  was serially done until the [facility] manipulator was palpated. The bladder flap was dissected a  small amount further to ensure that it was well away from the [facility] manipulator and all  of the bladder pillars. The Monopolar spatula was then used to amputate the specimen using  the VCare device as a landmark. The specimen was then completely amputated and the  lateralized pedicle was visualized and hemostasis was achieved using the LigaSure. Attention was then turned vaginally where the uterus, cervix, and bilateral tubes were  removed from the vagina and a bulb was placed in the vagina. One  had remained  abdominally and the pelvis was irrigated and hemostasis was noted. Attention was then turned back to the abdomen where a needle  was backloaded with the 5mm port and introduced the 2-0 v lock suture into the abdomen. Laparoscopic needle drivers were used to close the vaginal cuff closed with 2-0 v lock 9\" in a running fashion with excellent hemostasis. The uterosacral ligaments had been tagged. Irrigation performed with excellent hemostasis. Bilateral ureters identified again. Air was then removed from the abdomen. The port sites were then  removed.  The ports were closed with 4-0 Monocryl in subcuticular fashion and steris  was placed. Her legs were then taken out of lithotomy position and she tolerated the  procedure well. She was awoken from anesthesia and taken to the PACU in stable  condition.          Signed By: Yaw Mi MD

## 2021-04-21 ENCOUNTER — TELEPHONE (OUTPATIENT)
Dept: FAMILY MEDICINE CLINIC | Age: 41
End: 2021-04-21

## 2021-04-21 ENCOUNTER — VIRTUAL VISIT (OUTPATIENT)
Dept: FAMILY MEDICINE CLINIC | Age: 41
End: 2021-04-21
Payer: COMMERCIAL

## 2021-04-21 DIAGNOSIS — I10 ESSENTIAL HYPERTENSION: Primary | ICD-10-CM

## 2021-04-21 DIAGNOSIS — R06.09 DOE (DYSPNEA ON EXERTION): ICD-10-CM

## 2021-04-21 DIAGNOSIS — R00.2 PALPITATIONS: ICD-10-CM

## 2021-04-21 PROCEDURE — 99214 OFFICE O/P EST MOD 30 MIN: CPT | Performed by: FAMILY MEDICINE

## 2021-04-21 RX ORDER — FLUTICASONE PROPIONATE 50 MCG
SPRAY, SUSPENSION (ML) NASAL AS NEEDED
COMMUNITY
Start: 2021-04-14 | End: 2022-06-27

## 2021-04-21 RX ORDER — HYDRALAZINE HYDROCHLORIDE 25 MG/1
25 TABLET, FILM COATED ORAL 3 TIMES DAILY
Qty: 90 TAB | Refills: 0 | Status: SHIPPED | OUTPATIENT
Start: 2021-04-21 | End: 2021-05-13

## 2021-04-21 NOTE — LETTER
NOTIFICATION RETURN TO WORK / SCHOOL 
 
4/21/2021 8:50 AM 
 
Ms. Reg Teixeira 1 [facility] 99 [id number] To Whom It May Concern: 
 
Reg Teixeira is currently under the care of [address] [place] [address]. She will return to work/school on: Monday 4/26/21 due to non-infectious medical concern If there are questions or concerns please have the patient contact our office.  
 
 
 
Sincerely, 
 
 
Thurston Callow, MD

## 2021-04-21 NOTE — TELEPHONE ENCOUNTER
----- Message from Luci Duran MD sent at 4/21/2021  8:48 AM EDT -----  Regarding: pt of dr shields needs f/u  Gave hydralazine for bp today  Needs in person follow up   Ideally in about 2 wk  Pls call to assist with scheduling onto dr shields schedule if possible, if she is not available find a slot on my schedule pls  IN PERSON

## 2021-04-21 NOTE — PROGRESS NOTES
Brissa Arreguin is a 47 y.o. female who was seen by synchronous (real-time) audio-video technology on 4/21/2021. Consent: Brissa Arreguin, who was seen by synchronous (real-time) audio-video technology, and/or her healthcare decision maker, is aware that this patient-initiated, Telehealth encounter on 4/21/2021 is a billable service, with coverage as determined by her insurance carrier. She is aware that she may receive a bill and has provided verbal consent to proceed: Yes. Assessment & Plan:       ICD-10-CM ICD-9-CM    1. Essential hypertension  I10 401.9    2. Palpitations  R00.2 785.1    3. YAP (dyspnea on exertion)  R06.00 786.09      bp nearly goal  Add prn hydralazine temporarily if SBP >140 or DBP >90  F/u office 2 wk  Had labs with [facility]  With palpitations and yap consider holter monitor   Consider ?anxiety as driving cause as well, may need to work to treat, might benefit from BB in this cause  Plan to follow up in office        Pt was counseled on risks, benefits and alternatives of treatment options. All questions were asked and answered and the patient was agreeable with the treatment plan as outlined. Subjective:   Brissa Arreguin is a 47 y.o. female who was seen for Hypertension (. 1 week, 6/10 today.) and Nausea      The patient keeps having blood pressure jumping up and down  She has been really stressed recently  She is running back and forth to the restroom  Last week [facility] came out to work  She got sent home  She got some allergy meds  She got blood work     bp this morning was 137/99  Has been using that hctz that dr shields had been giving her    Is on 25 mg of that    Before she started having worsening pressures, was usually 120/70-80s    Medications, allergies, PMH, PSH, SOCH, [facility] reviewed and updated per routine protocol, see chart for review and changes if not noted here. ROS  A 12 point review of systems was negative except as noted here or in the HPI.     Objective: Vital Signs: (As obtained by patient/caregiver at home)  Patient-Reported Vitals 4/21/2021   Patient-Reported Weight 158lb   Patient-Reported Pulse 97   Patient-Reported Systolic  549   Patient-Reported Diastolic 99        [INSTRUCTIONS:  \"[x]\" Indicates a positive item  \"[]\" Indicates a negative item  -- DELETE ALL ITEMS NOT EXAMINED]    Constitutional: [x] Appears well-developed and well-nourished [x] No apparent distress      [] Abnormal -     Mental status: [x] Alert and awake  [x] Oriented to person/place/time [x] Able to follow commands    [] Abnormal -     Eyes:   EOM    [x]  Normal    [] Abnormal -   Sclera  [x]  Normal    [] Abnormal -          Discharge [x]  None visible   [] Abnormal -     HENT: [x] Normocephalic, atraumatic  [] Abnormal -   [x] Mouth/Throat: Mucous membranes are moist    External Ears [x] Normal  [] Abnormal -    Neck: [x] No visualized mass [] Abnormal -     Pulmonary/Chest: [x] Respiratory effort normal   [x] No visualized signs of difficulty breathing or respiratory distress        [] Abnormal -      Musculoskeletal:   [x] Normal gait with no signs of ataxia         [x] Normal range of motion of neck        [] Abnormal -     Neurological:        [x] No Facial Asymmetry (Cranial nerve 7 motor function) (limited exam due to video visit)          [x] No gaze palsy        [] Abnormal -          Skin:        [x] No significant exanthematous lesions or discoloration noted on facial skin         [] Abnormal -            Psychiatric:       [x] Normal Affect [] Abnormal -        [x] No Hallucinations    Other pertinent observable physical exam findings:ambulates through her work place, seems anxious at times    We discussed the expected course, resolution and complications of the diagnosis(es) in detail. Medication risks, benefits, costs, interactions, and alternatives were discussed as indicated.   I advised her to contact the office if her condition worsens, changes or fails to improve as anticipated. She expressed understanding with the diagnosis(es) and plan. Cyndy Thompson is a 47 y.o. female who was evaluated by a video visit encounter for concerns as above. Patient identification was verified prior to start of the visit. A caregiver was present when appropriate. Due to this being a TeleHealth encounter (During CYMO-07 public health emergency), evaluation of the following organ systems was limited: Vitals/Constitutional/EENT/Resp/CV/GI//MS/Neuro/Skin/Heme-Lymph-Imm. Pursuant to the emergency declaration under the [facility], [facility] waiver authority and the [employer] Act, this Virtual  Visit was conducted, with patient's (and/or legal guardian's) consent, to reduce the patient's risk of exposure to COVID-19 and provide necessary medical care. Services were provided through a video synchronous discussion virtually to substitute for in-person clinic visit. Patient and provider were located at their individual homes. Ryder Hernandez MD  [facility]  04/21/21 8:40 AM     Portions of this note may have been populated using smart dictation software and may have \"sounds-like\" errors present.

## 2021-04-21 NOTE — PROGRESS NOTES
Chief Complaint   Patient presents with    Hypertension     . 1 week, 6/10 today.  Nausea     1. Have you been to the ER, urgent care clinic since your last visit? Hospitalized since your last visit? Yes [facility], Headache. 2. Have you seen or consulted any other health care providers outside of the [address] [place] [address] since your last visit? Include any pap smears or colon screening.  No

## 2021-04-28 ENCOUNTER — TELEPHONE (OUTPATIENT)
Dept: FAMILY MEDICINE CLINIC | Age: 41
End: 2021-04-28

## 2021-04-28 NOTE — TELEPHONE ENCOUNTER
Needs an appointment for both these requests, better to see PCP (Dr Mariama Pleitez) for continuity of care in these cases usually but i'm happy to see her if she prefers that

## 2021-04-28 NOTE — TELEPHONE ENCOUNTER
Pt would like to speak with the nurse. States she has some questions from her last appointment.   Please call [phone]

## 2021-04-28 NOTE — TELEPHONE ENCOUNTER
Called and spoke with pt, and she has been scheduled for tomorrow, with Dr. Damaso Frausto, per her request.

## 2021-04-28 NOTE — TELEPHONE ENCOUNTER
Called and spoke with pt. She advises she is having trouble sleeping,  lots of stress, and wants to know if:    1. Dr. Fabricio Ingram will take her out of work until her scheduled appointment with her on 05/04/2021.  2. Prescribe her something for sleep.

## 2021-04-29 ENCOUNTER — VIRTUAL VISIT (OUTPATIENT)
Dept: FAMILY MEDICINE CLINIC | Age: 41
End: 2021-04-29
Payer: COMMERCIAL

## 2021-04-29 DIAGNOSIS — F41.9 MODERATE ANXIETY: ICD-10-CM

## 2021-04-29 DIAGNOSIS — F32.2 CURRENT SEVERE EPISODE OF MAJOR DEPRESSIVE DISORDER WITHOUT PSYCHOTIC FEATURES, UNSPECIFIED WHETHER RECURRENT (HCC): Primary | ICD-10-CM

## 2021-04-29 DIAGNOSIS — I10 ESSENTIAL HYPERTENSION: ICD-10-CM

## 2021-04-29 PROCEDURE — 99215 OFFICE O/P EST HI 40 MIN: CPT | Performed by: FAMILY MEDICINE

## 2021-04-29 RX ORDER — CITALOPRAM 20 MG/1
20 TABLET, FILM COATED ORAL DAILY
Qty: 30 TAB | Refills: 1 | Status: SHIPPED | OUTPATIENT
Start: 2021-04-29 | End: 2021-05-21 | Stop reason: SDUPTHER

## 2021-04-29 RX ORDER — HYDROXYZINE PAMOATE 25 MG/1
25 CAPSULE ORAL
Qty: 30 CAP | Refills: 1 | Status: SHIPPED | OUTPATIENT
Start: 2021-04-29 | End: 2021-06-24 | Stop reason: SDUPTHER

## 2021-04-29 NOTE — LETTER
NOTIFICATION RETURN TO WORK / SCHOOL 
 
4/29/2021 9:12 AM 
 
Ms. Hang Hurtado 1 [facility] [id number] To Whom It May Concern: 
 
Hang Hurtado is currently under the care of [address] [place] [address]. She will return to work/school on: 5/13/21 She is excused for a medical but non infectious condition. If there are questions or concerns please have the patient contact our office.  
 
 
 
Sincerely, 
 
 
Gloria Greene MD

## 2021-04-29 NOTE — PROGRESS NOTES
Chief Complaint   Patient presents with    Stress     Pt's blood pressure has been elevated and she does believe this is also coming from her job- did not go to work on yesterday     Sleep Problem     Pt is having trouble going to sleep 03/07 days a week      3 most recent [facility] 36 Screens 4/29/2021   Little interest or pleasure in doing things More than half the days   Feeling down, depressed, irritable, or hopeless More than half the days   Total Score PHQ 2 4   Trouble falling or staying asleep, or sleeping too much Several days   Feeling tired or having little energy Nearly every day   Poor appetite, weight loss, or overeating Nearly every day   Feeling bad about yourself - or that you are a failure or have let yourself or your family down Nearly every day   Trouble concentrating on things such as school, work, reading, or watching TV Nearly every day   Moving or speaking so slowly that other people could have noticed; or the opposite being so fidgety that others notice Not at all   Thoughts of being better off dead, or hurting yourself in some way Not at all   PHQ 9 Score 17   How difficult have these problems made it for you to do your work, take care of your home and get along with others Very difficult

## 2021-04-29 NOTE — PROGRESS NOTES
Nicholas Gonzalez is a 47 y.o. female who was seen by synchronous (real-time) audio-video technology on 4/29/2021. Consent: Nicholas Gonzalez, who was seen by synchronous (real-time) audio-video technology, and/or her healthcare decision maker, is aware that this patient-initiated, Telehealth encounter on 4/29/2021 is a billable service, with coverage as determined by her insurance carrier. She is aware that she may receive a bill and has provided verbal consent to proceed: Yes. Assessment & Plan:   1. Current severe episode of major depressive disorder without psychotic features, unspecified whether recurrent ([address]  2. Moderate anxiety  Will trial celexa, add vistaril for qhs prn sleep she agrees to this plan  Can keep from work x 2 wk while checking bp, adjusting medication, getting estb with counseling/therapy  Encouraged on self care  She is safe     3. Essential hypertension  accellerated today  1 wk follow up  No change today, seems driven by anxiety, much better at last visit, will reassess 1 wk          Pt was counseled on risks, benefits and alternatives of treatment options. All questions were asked and answered and the patient was agreeable with the treatment plan as outlined. Encounter time today was 40+ minutes and more than 50% of this encounter was spent in counseling face-to-face regarding Diagnosis, Patient Education, Medication Management, Compliance and Impressions. Time documented includes face to face time, documentation and chart review if applicable except as noted.   Subjective:   Nicholas Gonzalez is a 47 y.o. female who was seen for Stress (Pt's blood pressure has been elevated and she does believe this is also coming from her job- did not go to work on yesterday ) and Sleep Problem (Pt is having trouble going to sleep 03/07 days a week )      The patient reports to me that she is \"deeply stressed\" and she thinks that is driving her blood pressure and also perhaps her insomnia  Her performance at work is suffering  She is feeling like she's not acting herself--for example last night she last night locked herself in her room  She's usually a social butterfly and now she's preferring isolation    Already seeing counselor at group therapy today at [facility] (off of [address])    3 most recent PHQ Screens 4/29/2021   Little interest or pleasure in doing things More than half the days   Feeling down, depressed, irritable, or hopeless More than half the days   Total Score PHQ 2 4   Trouble falling or staying asleep, or sleeping too much Several days   Feeling tired or having little energy Nearly every day   Poor appetite, weight loss, or overeating Nearly every day   Feeling bad about yourself - or that you are a failure or have let yourself or your family down Nearly every day   Trouble concentrating on things such as school, work, reading, or watching TV Nearly every day   Moving or speaking so slowly that other people could have noticed; or the opposite being so fidgety that others notice Not at all   Thoughts of being better off dead, or hurting yourself in some way Not at all   PHQ 9 Score 17   How difficult have these problems made it for you to do your work, take care of your home and get along with others Very difficult       Medications, allergies, PMH, PSH, SOCH, [facility] reviewed and updated per routine protocol, see chart for review and changes if not noted here. ROS  A 12 point review of systems was negative except as noted here or in the HPI.     Objective:   Vital Signs: (As obtained by patient/caregiver at home)  Patient-Reported Vitals 4/29/2021   Patient-Reported Weight -   Patient-Reported Pulse 72   Patient-Reported Systolic  147   Patient-Reported Diastolic 767        [INSTRUCTIONS:  \"[x]\" Indicates a positive item  \"[]\" Indicates a negative item  -- DELETE ALL ITEMS NOT EXAMINED]    Constitutional: [x] Appears well-developed and well-nourished [x] No apparent distress      [] Abnormal -     Mental status: [x] Alert and awake  [x] Oriented to person/place/time [x] Able to follow commands    [] Abnormal -     Eyes:   EOM    [x]  Normal    [] Abnormal -   Sclera  [x]  Normal    [] Abnormal -          Discharge [x]  None visible   [] Abnormal -     HENT: [x] Normocephalic, atraumatic  [] Abnormal -   [x] Mouth/Throat: Mucous membranes are moist    External Ears [x] Normal  [] Abnormal -    Neck: [x] No visualized mass [] Abnormal -     Pulmonary/Chest: [x] Respiratory effort normal   [x] No visualized signs of difficulty breathing or respiratory distress        [] Abnormal -      Musculoskeletal:   [] Normal gait with no signs of ataxia         [x] Normal range of motion of neck        [] Abnormal -     Neurological:        [x] No Facial Asymmetry (Cranial nerve 7 motor function) (limited exam due to video visit)          [x] No gaze palsy        [] Abnormal -          Skin:        [x] No significant exanthematous lesions or discoloration noted on facial skin         [] Abnormal -            Psychiatric:       [x] Normal Affect [] Abnormal -        [x] No Hallucinations    Other pertinent observable physical exam findings:seated in vehicle, mood appropriate    We discussed the expected course, resolution and complications of the diagnosis(es) in detail. Medication risks, benefits, costs, interactions, and alternatives were discussed as indicated. I advised her to contact the office if her condition worsens, changes or fails to improve as anticipated. She expressed understanding with the diagnosis(es) and plan. Juany Hinojosa is a 47 y.o. female who was evaluated by a video visit encounter for concerns as above. Patient identification was verified prior to start of the visit. A caregiver was present when appropriate.  Due to this being a TeleHealth encounter (During AYFYW-36 public health emergency), evaluation of the following organ systems was limited: Vitals/Constitutional/EENT/Resp/CV/GI//MS/Neuro/Skin/Heme-Lymph-Imm. Pursuant to the emergency declaration under the [address] [place] [address], [facility] waiver authority and the [employer] Act, this Virtual  Visit was conducted, with patient's (and/or legal guardian's) consent, to reduce the patient's risk of exposure to COVID-19 and provide necessary medical care. Services were provided through a video synchronous discussion virtually to substitute for in-person clinic visit. Patient and provider were located at their individual homes. Alexei Vargas MD  [facility]  04/29/21 8:56 AM     Portions of this note may have been populated using smart dictation software and may have \"sounds-like\" errors present.

## 2021-04-30 ENCOUNTER — TELEPHONE (OUTPATIENT)
Dept: FAMILY MEDICINE CLINIC | Age: 41
End: 2021-04-30

## 2021-04-30 NOTE — TELEPHONE ENCOUNTER
Request for Medical Information to Support Disability faxed by Roly Vaughan to be completed for pt's employer ([employer]) and placed in Dr. Ervin Neely in basket for review.)  Chas

## 2021-05-04 ENCOUNTER — OFFICE VISIT (OUTPATIENT)
Dept: FAMILY MEDICINE CLINIC | Age: 41
End: 2021-05-04
Payer: COMMERCIAL

## 2021-05-04 VITALS
WEIGHT: 160 LBS | DIASTOLIC BLOOD PRESSURE: 77 MMHG | SYSTOLIC BLOOD PRESSURE: 117 MMHG | RESPIRATION RATE: 18 BRPM | HEART RATE: 77 BPM | HEIGHT: 60 IN | TEMPERATURE: 98.7 F | OXYGEN SATURATION: 98 % | BODY MASS INDEX: 31.41 KG/M2

## 2021-05-04 DIAGNOSIS — F32.2 CURRENT SEVERE EPISODE OF MAJOR DEPRESSIVE DISORDER WITHOUT PSYCHOTIC FEATURES, UNSPECIFIED WHETHER RECURRENT (HCC): Primary | ICD-10-CM

## 2021-05-04 DIAGNOSIS — E66.09 CLASS 1 OBESITY DUE TO EXCESS CALORIES WITH SERIOUS COMORBIDITY AND BODY MASS INDEX (BMI) OF 31.0 TO 31.9 IN ADULT: ICD-10-CM

## 2021-05-04 DIAGNOSIS — F41.9 MODERATE ANXIETY: ICD-10-CM

## 2021-05-04 DIAGNOSIS — R00.2 PALPITATIONS: ICD-10-CM

## 2021-05-04 DIAGNOSIS — I10 ESSENTIAL HYPERTENSION: ICD-10-CM

## 2021-05-04 PROCEDURE — 99215 OFFICE O/P EST HI 40 MIN: CPT | Performed by: FAMILY MEDICINE

## 2021-05-04 NOTE — PROGRESS NOTES
Family Medicine Follow-Up Progress Note  Patient: Mahsa Beaver  1980, 47 y.o., female  Encounter Date: 5/4/2021    ASSESSMENT & PLAN    ICD-10-CM ICD-9-CM    1. Current severe episode of major depressive disorder without psychotic features, unspecified whether recurrent (HCC)  F32.2 296.23 CBC W/O DIFF      TSH 3RD GENERATION   2. Essential hypertension  I10 401.9 CBC W/O DIFF      METABOLIC PANEL, COMPREHENSIVE      LIPID PANEL   3. Palpitations  R00.2 785.1 CBC W/O DIFF      TSH 3RD GENERATION   4. Moderate anxiety  F41.9 300.00    5. Class 1 obesity due to excess calories with serious comorbidity and body mass index (BMI) of 31.0 to 31.9 in adult  E66.09 278.00     Z68.31 V85.31        Orders Placed This Encounter    CBC W/O DIFF     Standing Status:   Future     Standing Expiration Date:   7/6/0756    METABOLIC PANEL, COMPREHENSIVE     Standing Status:   Future     Standing Expiration Date:   5/4/2022    LIPID PANEL     Standing Status:   Future     Standing Expiration Date:   5/4/2022    TSH 3RD GENERATION     Standing Status:   Future     Standing Expiration Date:   5/4/2022     Labs per orders  bp ok today, will watch, hydralazine used yesterday and brought it down to normal range  Depression is 5 d into treatment, do not expect yet control, had se with meds at night, taking now in the AM as discussed    F/u one month  Keep seeing counselor/therapist    Time spent including care coordination, paperwork completed, visit and patient encounter exceeded 45 minutes on date of visit. CHIEF COMPLAINT  Chief Complaint   Patient presents with    Hypertension     fu htn pt feels meds are working and  has not noticed any side effects        SUBJECTIVE  Mahsa Beaver is a 47 y.o. female presenting today for follow up    HTN: patient reports compliant w medications.  No chest pain, sob, headache, blurred vision, leg swelling, diaphoresis, fall is checking blood pressures at home and reports range is fluctuating    Has played with her medication--found that celexa gives her insomnia then if she takes it at bedtime, she is going to be taking it in the morning from here on out and   Hasn't yet noticed any difference in her mood  She has some nausea associated with taking it  It's only been 4-5 days    Yesterday she took the hydralazine once and hasn't had to take it again  Today she just took her regular hctz and she is doing well    Went to therapy last week after she spoke to me and she has given her therapists info to her FMLA as well  Her therapist is Dr Harl [facility] of [address]  Review of Systems  A 12 point review of systems was negative except as noted here or in the HPI. OBJECTIVE  Visit Vitals  /77   Pulse 77   Temp 98.7 °F (37.1 °C) (Oral)   Resp 18   Ht 5' (1.524 m)   Wt 160 lb (72.6 kg)   LMP 05/01/2020 (Exact Date)   SpO2 98%   BMI 31.25 kg/m²       Physical Exam  Vitals signs and nursing note reviewed. Constitutional:       General: She is not in acute distress. Appearance: Normal appearance. She is not ill-appearing, toxic-appearing or diaphoretic. HENT:      Head: Normocephalic and atraumatic. Right Ear: External ear normal.      Left Ear: External ear normal.   Eyes:      General: No scleral icterus. Right eye: No discharge. Left eye: No discharge. Comments: eom grossly intact   Neck:      Musculoskeletal: No neck rigidity or muscular tenderness. Comments: No visible neck masses , ROM appears normal from visual inspection  Cardiovascular:      Rate and Rhythm: Normal rate and regular rhythm. Heart sounds: No murmur. No friction rub. No gallop. Pulmonary:      Effort: Pulmonary effort is normal. No respiratory distress. Breath sounds: No stridor. No wheezing or rhonchi. Abdominal:      General: There is no distension. Palpations: Abdomen is soft. Musculoskeletal:      Right lower leg: No edema. Left lower leg: No edema. Skin:     Findings: No rash. Comments: Visible skin is without jaundice, bruising, lesion, pallor, erythema or rash except as otherwise noted   Neurological:      General: No focal deficit present. Mental Status: She is alert and oriented to person, place, and time. Psychiatric:         Mood and Affect: Mood normal.         Behavior: Behavior normal.         Thought Content: Thought content normal.         Judgment: Judgment normal.         No results found for any visits on 05/04/21.     HISTORICAL  Reviewed and updated today, and as noted below:    Past Medical History:   Diagnosis Date    Essential hypertension 5/9/2017    Takes HCTZ daily    Hypothyroidism, adult 9/11/2015    Motion sickness     Radiculopathy of cervical region 9/11/2015     Past Surgical History:   Procedure Laterality Date    HX GYN  2007    D&C, tubal ligation    HX GYN  2020    hystorectomy    HX ORTHOPAEDIC Left 01/2018    Foot surgery / Callus removed      Family History   Problem Relation Age of Onset    Hypertension Mother     Other Father         Hypercholesterol    Anesth Problems Neg Hx      Social History     Tobacco Use   Smoking Status Never Smoker   Smokeless Tobacco Never Used     Social History     Socioeconomic History    Marital status:      Spouse name: Not on file    Number of children: Not on file    Years of education: Not on file    Highest education level: Not on file   Tobacco Use    Smoking status: Never Smoker    Smokeless tobacco: Never Used   Substance and Sexual Activity    Alcohol use: Yes     Comment: occasionally    Drug use: No     Allergies   Allergen Reactions    Flagyl [Metronidazole] Diarrhea       LAB REVIEW  Lab Results   Component Value Date/Time    Sodium 138 01/17/2020 09:24 PM    Potassium 3.1 (L) 01/17/2020 09:24 PM    Chloride 104 01/17/2020 09:24 PM    CO2 30 01/17/2020 09:24 PM    Anion gap 4 (L) 01/17/2020 09:24 PM    Glucose 101 (H) 01/17/2020 09:24 PM    BUN 18 01/17/2020 09:24 PM    Creatinine 0.93 01/17/2020 09:24 PM    BUN/Creatinine ratio 19 01/17/2020 09:24 PM    GFR est AA >60 01/17/2020 09:24 PM    GFR est non-AA >60 01/17/2020 09:24 PM    Calcium 8.4 (L) 01/17/2020 09:24 PM    Bilirubin, total 0.3 01/17/2020 09:24 PM    Alk. phosphatase 59 01/17/2020 09:24 PM    Protein, total 7.4 01/17/2020 09:24 PM    Albumin 3.5 01/17/2020 09:24 PM    Globulin 3.9 01/17/2020 09:24 PM    A-G Ratio 0.9 (L) 01/17/2020 09:24 PM    ALT (SGPT) 21 01/17/2020 09:24 PM     Lab Results   Component Value Date/Time    WBC 5.9 05/13/2020 08:51 AM    Hemoglobin (POC) 12.9 05/27/2020 06:40 AM    HGB 12.8 05/13/2020 08:51 AM    HCT 40.4 05/13/2020 08:51 AM    PLATELET 358 90/40/2393 08:51 AM    MCV 95.1 05/13/2020 08:51 AM     No results found for: HBA1C, HGBE8, TAR9LHOA, HQB5YGOQ, YVC9ORMZ  No results found for: CHOL, CHOLPOCT, CHOLX, CHLST, CHOLV, HDL, HDLPOC, HDLP, LDL, LDLCPOC, LDLC, DLDLP, VLDLC, VLDL, TGLX, TRIGL, TRIGP, TGLPOCT, CHHD, CHHDX        Meliza Orosco MD  [facility]  05/04/21 3:46 PM    Portions of this note may have been populated using smart dictation software and may have \"sounds-like\" errors present. Pt was counseled on risks, benefits and alternatives of treatment options. All questions were asked and answered and the patient was agreeable with the treatment plan as outlined.

## 2021-05-04 NOTE — PROGRESS NOTES
Chief Complaint   Patient presents with    Hypertension     fu htn pt feels meds are working and  has not noticed any side effects

## 2021-05-13 RX ORDER — HYDRALAZINE HYDROCHLORIDE 25 MG/1
TABLET, FILM COATED ORAL
Qty: 90 TAB | Refills: 0 | Status: SHIPPED | OUTPATIENT
Start: 2021-05-13 | End: 2021-06-22

## 2021-05-21 RX ORDER — CITALOPRAM 20 MG/1
20 TABLET, FILM COATED ORAL DAILY
Qty: 90 TABLET | Refills: 0 | Status: SHIPPED | OUTPATIENT
Start: 2021-05-21 | End: 2022-06-27

## 2021-05-28 ENCOUNTER — OFFICE VISIT (OUTPATIENT)
Dept: FAMILY MEDICINE CLINIC | Age: 41
End: 2021-05-28
Payer: COMMERCIAL

## 2021-05-28 VITALS
HEIGHT: 60 IN | OXYGEN SATURATION: 100 % | BODY MASS INDEX: 32.2 KG/M2 | SYSTOLIC BLOOD PRESSURE: 133 MMHG | WEIGHT: 164 LBS | TEMPERATURE: 97.6 F | DIASTOLIC BLOOD PRESSURE: 83 MMHG | RESPIRATION RATE: 16 BRPM | HEART RATE: 80 BPM

## 2021-05-28 DIAGNOSIS — E78.49 OTHER HYPERLIPIDEMIA: ICD-10-CM

## 2021-05-28 DIAGNOSIS — F41.9 MODERATE ANXIETY: ICD-10-CM

## 2021-05-28 DIAGNOSIS — I10 ESSENTIAL HYPERTENSION: ICD-10-CM

## 2021-05-28 DIAGNOSIS — F32.2 CURRENT SEVERE EPISODE OF MAJOR DEPRESSIVE DISORDER WITHOUT PSYCHOTIC FEATURES, UNSPECIFIED WHETHER RECURRENT (HCC): Primary | ICD-10-CM

## 2021-05-28 PROCEDURE — 99214 OFFICE O/P EST MOD 30 MIN: CPT | Performed by: FAMILY MEDICINE

## 2021-05-28 RX ORDER — HYDROCHLOROTHIAZIDE 25 MG/1
TABLET ORAL
Qty: 90 TABLET | Refills: 1 | Status: SHIPPED | OUTPATIENT
Start: 2021-05-28 | End: 2021-08-27

## 2021-05-28 NOTE — PROGRESS NOTES
Family Medicine Follow-Up Progress Note  Patient: Alejandro Ribeiro  1980, 47 y.o., female  Encounter Date: 5/28/2021    ASSESSMENT & PLAN    ICD-10-CM ICD-9-CM    1. Current severe episode of major depressive disorder without psychotic features, unspecified whether recurrent ([facility]  F32.2 296.23    2. Essential hypertension  I10 401.9 hydroCHLOROthiazide (HYDRODIURIL) 25 mg tablet   3. Moderate anxiety  F41.9 300.00    4. Other hyperlipidemia  E78.49 272.4        Continue with Celexa at current dose, no changes  Continue with hydrochlorothiazide at current dose, no changes  Borderline elevated LDL, slightly low HDL, normal triglycerides on check of lipid panel, encourage patient on healthy high-fiber diet, low in carbohydrates, discussed cutting back on soda and being mindful of carbohydrate intake that she is consuming and beverages  Follow-up 6 months or sooner if needed, call with concerns  CHIEF COMPLAINT  Chief Complaint   Patient presents with    Follow Up Chronic Condition     4 wk fu for htn med compliant and feels its wnl noc at this time     Hypertension       SUBJECTIVE  Alejandro Ribeiro is a 47 y.o. female presenting today for aloe up  She has had to use hydralazine 1 or 2 times since last time we spoke, she is otherwise doing well and the hydrochlorothiazide seems to be controlling her blood pressure on the whole. Her mood is also improved with the use of the Celexa and she has a small side effect of abnormal feeling in her stomach but it is tolerable on the whole. Her anxiety and depression do seem to be better and she is also continuing to see therapy. Today no nausea or vomiting, no diarrhea or constipation, no fever or chills, no chest pain or shortness of breath, no headache or vision changes, no falling down or passing out.   She otherwise is doing well, her weight is stable    ROS  Review of Systems  A 12 point review of systems was negative except as noted here or in the HPI.    OBJECTIVE  Visit Vitals  /83   Pulse 80   Temp 97.6 °F (36.4 °C) (Tympanic)   Resp 16   Ht 5' (1.524 m)   Wt 164 lb (74.4 kg)   LMP 05/01/2020 (Exact Date)   SpO2 100%   BMI 32.03 kg/m²       Physical Exam  Vitals and nursing note reviewed. Constitutional:       General: She is not in acute distress. Appearance: Normal appearance. She is not ill-appearing, toxic-appearing or diaphoretic. HENT:      Head: Normocephalic and atraumatic. Right Ear: External ear normal.      Left Ear: External ear normal.   Eyes:      General: No scleral icterus. Right eye: No discharge. Left eye: No discharge. Comments: eom grossly intact   Neck:      Comments: No visible neck masses , ROM appears normal from visual inspection  Cardiovascular:      Rate and Rhythm: Normal rate and regular rhythm. Pulses: Normal pulses. Heart sounds: Normal heart sounds. No murmur heard. No friction rub. No gallop. Pulmonary:      Effort: Pulmonary effort is normal. No respiratory distress. Breath sounds: No stridor. No wheezing, rhonchi or rales. Abdominal:      General: There is no distension. Palpations: There is no mass. Tenderness: There is no abdominal tenderness. There is no guarding or rebound. Skin:     Comments: Visible skin is without jaundice, bruising, lesion, pallor, erythema or rash except as otherwise noted   Neurological:      General: No focal deficit present. Mental Status: She is alert and oriented to person, place, and time. Psychiatric:         Mood and Affect: Mood normal.         Behavior: Behavior normal.         Thought Content: Thought content normal.         Judgment: Judgment normal.         No results found for any visits on 05/28/21.     HISTORICAL  Reviewed and updated today, and as noted below:    Past Medical History:   Diagnosis Date    Essential hypertension 5/9/2017    Takes HCTZ daily    Hypothyroidism, adult 9/11/2015    Motion sickness     Radiculopathy of cervical region 9/11/2015     Past Surgical History:   Procedure Laterality Date    HX GYN  2007    D&C, tubal ligation    HX GYN  2020    hystorectomy    HX ORTHOPAEDIC Left 01/2018    Foot surgery / Callus removed      Family History   Problem Relation Age of Onset    Hypertension Mother     Other Father         Hypercholesterol    Anesth Problems Neg Hx      Social History     Tobacco Use   Smoking Status Never Smoker   Smokeless Tobacco Never Used     Social History     Socioeconomic History    Marital status:      Spouse name: Not on file    Number of children: Not on file    Years of education: Not on file    Highest education level: Not on file   Tobacco Use    Smoking status: Never Smoker    Smokeless tobacco: Never Used   Vaping Use    Vaping Use: Never used   Substance and Sexual Activity    Alcohol use: Yes     Comment: occasionally    Drug use: No     Social Determinants of Health     Financial Resource Strain:     Difficulty of Paying Living Expenses:    Food Insecurity:     Worried About Running Out of Food in the Last Year:     Ran Out of Food in the Last Year:    Transportation Needs:     Lack of Transportation (Medical):      Lack of Transportation (Non-Medical):    Physical Activity:     Days of Exercise per Week:     Minutes of Exercise per Session:    Stress:     Feeling of Stress :    Social Connections:     Frequency of Communication with Friends and Family:     Frequency of Social Gatherings with Friends and Family:     Attends Jewish Services:     Active Member of Clubs or Organizations:     Attends Club or Organization Meetings:     Marital Status:      Allergies   Allergen Reactions    Flagyl [Metronidazole] Diarrhea       LAB REVIEW  Lab Results   Component Value Date/Time    Sodium 139 05/20/2021 09:56 AM    Potassium 4.0 05/20/2021 09:56 AM    Chloride 109 (H) 05/20/2021 09:56 AM    CO2 25 05/20/2021 09:56 AM    Anion gap 5 05/20/2021 09:56 AM    Glucose 81 05/20/2021 09:56 AM    BUN 10 05/20/2021 09:56 AM    Creatinine 0.75 05/20/2021 09:56 AM    BUN/Creatinine ratio 13 05/20/2021 09:56 AM    GFR est AA >60 05/20/2021 09:56 AM    GFR est non-AA >60 05/20/2021 09:56 AM    Calcium 8.3 (L) 05/20/2021 09:56 AM    Bilirubin, total 0.5 05/20/2021 09:56 AM    Alk. phosphatase 73 05/20/2021 09:56 AM    Protein, total 6.9 05/20/2021 09:56 AM    Albumin 3.6 05/20/2021 09:56 AM    Globulin 3.3 05/20/2021 09:56 AM    A-G Ratio 1.1 05/20/2021 09:56 AM    ALT (SGPT) 20 05/20/2021 09:56 AM     Lab Results   Component Value Date/Time    WBC 7.1 05/20/2021 09:56 AM    Hemoglobin (POC) 12.9 05/27/2020 06:40 AM    HGB 12.4 05/20/2021 09:56 AM    HCT 37.8 05/20/2021 09:56 AM    PLATELET 257 21/77/8277 09:56 AM    MCV 96.2 05/20/2021 09:56 AM     No results found for: HBA1C, HMP9LBJQ, ULZ6DVWG, FGQ2ZFMH  Lab Results   Component Value Date/Time    Cholesterol, total 204 (H) 05/20/2021 09:56 AM    HDL Cholesterol 46 05/20/2021 09:56 AM    LDL, calculated 133.6 (H) 05/20/2021 09:56 AM    VLDL, calculated 24.4 05/20/2021 09:56 AM    Triglyceride 122 05/20/2021 09:56 AM    CHOL/HDL Ratio 4.4 05/20/2021 09:56 AM           Cathy Barragan MD  [facility]  05/28/21 12:55 PM    Portions of this note may have been populated using smart dictation software and may have \"sounds-like\" errors present. Pt was counseled on risks, benefits and alternatives of treatment options. All questions were asked and answered and the patient was agreeable with the treatment plan as outlined.

## 2021-05-28 NOTE — PROGRESS NOTES
Chief Complaint   Patient presents with    Follow Up Chronic Condition     4 wk fu for htn med compliant and feels its wnl noc at this time     Hypertension

## 2021-06-14 ENCOUNTER — TELEPHONE (OUTPATIENT)
Dept: FAMILY MEDICINE CLINIC | Age: 41
End: 2021-06-14

## 2021-06-14 NOTE — TELEPHONE ENCOUNTER
Michelle Sharma paperwork came over on patient. Shyla's Disability and leave provider statement. Forms placed in Dr. Fabienne Brooks for review.

## 2021-06-15 NOTE — TELEPHONE ENCOUNTER
Called and spoke with pt, and she has been advised and states understanding of this and has been scheduled for tomorrow to discuss.

## 2021-06-15 NOTE — TELEPHONE ENCOUNTER
Pls ask pt for vv  I dont' have access to prior forms sent 5/10 and 5/13 as sent ot scanning but the details in this paperwork seem to be that if she does a visit I can do it all at once with her

## 2021-06-16 ENCOUNTER — VIRTUAL VISIT (OUTPATIENT)
Dept: FAMILY MEDICINE CLINIC | Age: 41
End: 2021-06-16
Payer: COMMERCIAL

## 2021-06-16 DIAGNOSIS — F32.2 CURRENT SEVERE EPISODE OF MAJOR DEPRESSIVE DISORDER WITHOUT PSYCHOTIC FEATURES, UNSPECIFIED WHETHER RECURRENT (HCC): ICD-10-CM

## 2021-06-16 DIAGNOSIS — I10 ESSENTIAL HYPERTENSION: Primary | ICD-10-CM

## 2021-06-16 DIAGNOSIS — F41.9 MODERATE ANXIETY: ICD-10-CM

## 2021-06-16 PROCEDURE — 99214 OFFICE O/P EST MOD 30 MIN: CPT | Performed by: FAMILY MEDICINE

## 2021-06-16 NOTE — PROGRESS NOTES
Alejandro Ribeiro is a 47 y.o. female who was seen by synchronous (real-time) audio-video technology on 6/16/2021. Consent: Alejandro Ribeiro, who was seen by synchronous (real-time) audio-video technology, and/or her healthcare decision maker, is aware that this patient-initiated, Telehealth encounter on 6/16/2021 is a billable service, with coverage as determined by her insurance carrier. She is aware that she may receive a bill and has provided verbal consent to proceed: Yes. Assessment & Plan:       ICD-10-CM ICD-9-CM    1. Essential hypertension  I10 401.9    2. Moderate anxiety  F41.9 300.00    3. Current severe episode of major depressive disorder without psychotic features, unspecified [facility]  F32.2 296.23      C/w celexa 20mg  C/w Atarax 25mg prn  C/w HCTZ 25mg daily  C/w Hydralazine PRN for elevated bp readings  Pt agreeable to plan  Continue to follow with counselor--to be seen again next week    Paperwork completed for disability company [employer], due 6/20/21, will fax back along with most recent note, was prior filled and faxed back 5/10 and 5/13 per my records. Keep f/u appt for 8/27/21, call sooner if needed      Pt was counseled on risks, benefits and alternatives of treatment options. All questions were asked and answered and the patient was agreeable with the treatment plan as outlined. Total time on the date of encounter exceeded 35 minutes and included patient care, coordination of care, charting and preparation for visit.     Subjective:   Alejandro Ribeiro is a 47 y.o. 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    She is compliant with celexa  She uses hydroxyzine PRN    bp is up and down she reports to me, it seems related to her anxiety--she had to take hydralazine yesterday   Today her bp was controlled    She is not using atarax prn in day b/c of fatigue/sleepiness    6 pages of paperwork were sent from [facility], done during the visit today to expedite for patient and ensure completion was done appropriately    Medications, allergies, PMH, PSH, SOCH, [facility] reviewed and updated per routine protocol, see chart for review and changes if not noted here. ROS  A 12 point review of systems was negative except as noted here or in the HPI.     Objective:   Vital Signs: (As obtained by patient/caregiver at home)  Patient-Reported Vitals 6/16/2021   Patient-Reported Weight -   Patient-Reported Pulse -   Patient-Reported Systolic  640   Patient-Reported Diastolic 80        [INSTRUCTIONS:  \"[x]\" Indicates a positive item  \"[]\" Indicates a negative item  -- DELETE ALL ITEMS NOT EXAMINED]    Constitutional: [x] Appears well-developed and well-nourished [x] No apparent distress      [] Abnormal -     Mental status: [x] Alert and awake  [x] Oriented to person/place/time [x] Able to follow commands    [] Abnormal -     Eyes:   EOM    [x]  Normal    [] Abnormal -   Sclera  [x]  Normal    [] Abnormal -          Discharge [x]  None visible   [] Abnormal -     HENT: [x] Normocephalic, atraumatic  [] Abnormal -   [x] Mouth/Throat: Mucous membranes are moist    External Ears [x] Normal  [] Abnormal -    Neck: [x] No visualized mass [] Abnormal -     Pulmonary/Chest: [x] Respiratory effort normal   [x] No visualized signs of difficulty breathing or respiratory distress        [] Abnormal -      Musculoskeletal:   [] Normal gait with no signs of ataxia         [x] Normal range of motion of neck        [] Abnormal -     Neurological:        [x] No Facial Asymmetry (Cranial nerve 7 motor function) (limited exam due to video visit)          [x] No gaze palsy        [] Abnormal -          Skin:        [x] No significant exanthematous lesions or discoloration noted on facial skin         [] Abnormal -            Psychiatric:       [x] Normal Affect [] Abnormal -        [x] No Hallucinations    Other pertinent observable physical exam findings:well appearing, mood congruent, appears to feel Full range of emotion, normal speech pattern, thought content and judement    We discussed the expected course, resolution and complications of the diagnosis(es) in detail. Medication risks, benefits, costs, interactions, and alternatives were discussed as indicated. I advised her to contact the office if her condition worsens, changes or fails to improve as anticipated. She expressed understanding with the diagnosis(es) and plan. Елена Mata is a 47 y.o. female who was evaluated by a video visit encounter for concerns as above. Patient identification was verified prior to start of the visit. A caregiver was present when appropriate. Due to this being a TeleHealth encounter (During CWWKO-65 public health emergency), evaluation of the following organ systems was limited: Vitals/Constitutional/EENT/Resp/CV/GI//MS/Neuro/Skin/Heme-Lymph-Imm. Pursuant to the emergency declaration under the [facility], [facility]5 waiver authority and the [employer] Act, this Virtual  Visit was conducted, with patient's (and/or legal guardian's) consent, to reduce the patient's risk of exposure to COVID-19 and provide necessary medical care. Services were provided through a video synchronous discussion virtually to substitute for in-person clinic visit. Patient and provider were located at their individual homes.       Pradip Goodwin MD  [facility]  06/16/21 3:58 PM     Portions of this note may have been populated using smart dictation software and may have \"sounds-like\" errors present.

## 2021-06-22 RX ORDER — HYDRALAZINE HYDROCHLORIDE 25 MG/1
TABLET, FILM COATED ORAL
Qty: 90 TABLET | Refills: 0 | Status: SHIPPED | OUTPATIENT
Start: 2021-06-22 | End: 2021-07-22

## 2021-06-24 RX ORDER — HYDROXYZINE PAMOATE 25 MG/1
25 CAPSULE ORAL
Qty: 90 CAPSULE | Refills: 1 | OUTPATIENT
Start: 2021-06-24 | End: 2022-07-10

## 2021-07-13 ENCOUNTER — VIRTUAL VISIT (OUTPATIENT)
Dept: FAMILY MEDICINE CLINIC | Age: 41
End: 2021-07-13
Payer: COMMERCIAL

## 2021-07-13 DIAGNOSIS — R60.9 EDEMA, UNSPECIFIED TYPE: Primary | ICD-10-CM

## 2021-07-13 DIAGNOSIS — F41.9 MODERATE ANXIETY: ICD-10-CM

## 2021-07-13 DIAGNOSIS — I10 ESSENTIAL HYPERTENSION: ICD-10-CM

## 2021-07-13 PROCEDURE — 99214 OFFICE O/P EST MOD 30 MIN: CPT | Performed by: FAMILY MEDICINE

## 2021-07-13 RX ORDER — FUROSEMIDE 20 MG/1
20 TABLET ORAL DAILY
Qty: 30 TABLET | Refills: 0 | Status: SHIPPED | OUTPATIENT
Start: 2021-07-13 | End: 2021-08-10

## 2021-07-13 NOTE — PROGRESS NOTES
Chief Complaint   Patient presents with    Hand Swelling     Bilateral, pain 4/10, x 1 week getting worse.  Foot Swelling     Bilateral, pain 4/10     1. Have you been to the ER, urgent care clinic since your last visit? Hospitalized since your last visit? No    2. Have you seen or consulted any other health care providers outside of the [address] [place] [address] since your last visit? Include any pap smears or colon screening.  No

## 2021-07-13 NOTE — PROGRESS NOTES
Alexa Farley is a 47 y.o. female who was seen by synchronous (real-time) audio-video technology on 7/13/2021. Assessment & Plan:   Diagnoses and all orders for this visit:    1. Edema, unspecified type  -     furosemide (LASIX) 20 mg tablet; Take 1 Tablet by mouth daily. 2. Essential hypertension    3. Moderate anxiety        Uncertain etiology of edema, likely weight related and the fact she has been on her feet more  Blood pressure elevated today, possibly due to anxiety  Start Lasix, will need to watch her potassium carefully due to the hydrochlorothiazide she is taking  Elevate legs  Support hose    Follow-up and Dispositions    · Return in about 2 weeks (around 7/27/2021) for blood pressure, edema - have home blood pressures ready. Reviewed plan of care. Patient has provided input and agrees with goals. CPT Codes 54678-80749 for Established Patients may apply to this Telehealth Visit      Subjective:   Alexa Farley was seen for Hand Swelling (Bilateral, pain 4/10, x 1 week getting worse.) and Foot Swelling (Bilateral, pain 4/10)      Patient presents with:  Hand Swelling: Bilateral, pain 4/10, x 1 week getting worse. Foot Swelling: Bilateral, pain 4/10    The started about a week ago and is getting worse. The swelling is bilateral.  This started when she went back to work. She stands constantly. No change in diet or increased heat exposure. She has tried elevation with mild relief. She is able to wear her rings, but they get stuck and her shoes are difficult to get on. Also, her blood pressure is elevated to day and she has HTN. Review of Systems   Respiratory: Negative for shortness of breath. Cardiovascular: Positive for leg swelling. Negative for chest pain. Skin:        No leg redness or warmth         Objective:   /102, P 76    Physical Exam  Constitutional:       General: She is not in acute distress. Appearance: Normal appearance. Pulmonary:      Effort: Pulmonary effort is normal.   Musculoskeletal:      Right lower leg: Edema present. Left lower leg: Edema present. Comments: 3+ LE edema, hands puffy. Skin:     Findings: No erythema. Neurological:      Mental Status: She is alert and oriented to person, place, and time. Lab Results   Component Value Date/Time    Sodium 139 05/20/2021 09:56 AM    Potassium 4.0 05/20/2021 09:56 AM    Chloride 109 (H) 05/20/2021 09:56 AM    CO2 25 05/20/2021 09:56 AM    Anion gap 5 05/20/2021 09:56 AM    Glucose 81 05/20/2021 09:56 AM    BUN 10 05/20/2021 09:56 AM    Creatinine 0.75 05/20/2021 09:56 AM    BUN/Creatinine ratio 13 05/20/2021 09:56 AM    GFR est AA >60 05/20/2021 09:56 AM    GFR est non-AA >60 05/20/2021 09:56 AM    Calcium 8.3 (L) 05/20/2021 09:56 AM           Due to this being a TeleHealth evaluation, many elements of the physical examination are unable to be assessed. We discussed the expected course, resolution and complications of the diagnosis(es) in detail. Medication risks, benefits, costs, interactions, and alternatives were discussed as indicated. I advised her to contact the office if her condition worsens, changes or fails to improve as anticipated. She expressed understanding with the diagnosis(es) and plan. Pursuant to the emergency declaration under the [address] [place] [address], [facility] waiver authority and the [employer] Act, this Virtual  Visit was conducted, with patient's consent, to reduce the patient's risk of exposure to COVID-19 and provide continuity of care for an established patient. Services were provided through a video synchronous discussion virtually to substitute for in-person clinic visit.     Kim Tran MD

## 2021-07-22 RX ORDER — HYDRALAZINE HYDROCHLORIDE 25 MG/1
TABLET, FILM COATED ORAL
Qty: 90 TABLET | Refills: 0 | Status: SHIPPED | OUTPATIENT
Start: 2021-07-22 | End: 2021-08-24

## 2021-08-04 DIAGNOSIS — R60.9 EDEMA, UNSPECIFIED TYPE: ICD-10-CM

## 2021-08-10 RX ORDER — FUROSEMIDE 20 MG/1
TABLET ORAL
Qty: 90 TABLET | Refills: 3 | Status: SHIPPED | OUTPATIENT
Start: 2021-08-10 | End: 2022-06-27

## 2021-08-24 RX ORDER — HYDRALAZINE HYDROCHLORIDE 25 MG/1
TABLET, FILM COATED ORAL
Qty: 90 TABLET | Refills: 0 | Status: SHIPPED | OUTPATIENT
Start: 2021-08-24 | End: 2021-09-23

## 2021-08-27 ENCOUNTER — OFFICE VISIT (OUTPATIENT)
Dept: FAMILY MEDICINE CLINIC | Age: 41
End: 2021-08-27
Payer: COMMERCIAL

## 2021-08-27 VITALS
HEART RATE: 90 BPM | BODY MASS INDEX: 32.39 KG/M2 | RESPIRATION RATE: 14 BRPM | HEIGHT: 60 IN | OXYGEN SATURATION: 99 % | DIASTOLIC BLOOD PRESSURE: 72 MMHG | WEIGHT: 165 LBS | TEMPERATURE: 98.1 F | SYSTOLIC BLOOD PRESSURE: 120 MMHG

## 2021-08-27 DIAGNOSIS — Z79.899 ENCOUNTER FOR MONITORING DIURETIC THERAPY: Primary | ICD-10-CM

## 2021-08-27 DIAGNOSIS — R60.9 EDEMA, UNSPECIFIED TYPE: ICD-10-CM

## 2021-08-27 DIAGNOSIS — I10 ESSENTIAL HYPERTENSION: ICD-10-CM

## 2021-08-27 DIAGNOSIS — Z51.81 ENCOUNTER FOR MONITORING DIURETIC THERAPY: Primary | ICD-10-CM

## 2021-08-27 PROCEDURE — 99214 OFFICE O/P EST MOD 30 MIN: CPT | Performed by: FAMILY MEDICINE

## 2021-08-27 RX ORDER — LISINOPRIL AND HYDROCHLOROTHIAZIDE 10; 12.5 MG/1; MG/1
1 TABLET ORAL DAILY
Qty: 30 TABLET | Refills: 1 | Status: SHIPPED | OUTPATIENT
Start: 2021-08-27 | End: 2021-09-23

## 2021-08-27 NOTE — PATIENT INSTRUCTIONS
Lab 2 wk  Change from hctz to lisinopril hctz to minimize k loss  Will be ok to use lasix prn  Low sodium, high potassium diet  Recheck in office 2 wk

## 2021-08-27 NOTE — PROGRESS NOTES
Melina Aleman is a 47 y.o. female    Chief Complaint   Patient presents with    Follow-up     3 mo    Hypertension    Medication Evaluation     questions on taking lasix and HCTZ together        Health Maintenance Due   Topic Date Due    COVID-19 Vaccine (1) Never done    PAP AKA CERVICAL CYTOLOGY  01/15/2020       Visit Vitals  /72 (BP 1 Location: Left upper arm, BP Patient Position: Sitting)   Pulse 90   Temp 98.1 °F (36.7 °C) (Temporal)   Resp 14   Ht 5' (1.524 m)   Wt 165 lb (74.8 kg)   LMP 05/01/2020 (Exact Date)   SpO2 99%   BMI 32.22 kg/m²       3 most recent PHQ Screens 8/27/2021   PHQ Not Done -   Little interest or pleasure in doing things Not at all   Feeling down, depressed, irritable, or hopeless Not at all   Total Score PHQ 2 0   Trouble falling or staying asleep, or sleeping too much -   Feeling tired or having little energy -   Poor appetite, weight loss, or overeating -   Feeling bad about yourself - or that you are a failure or have let yourself or your family down -   Trouble concentrating on things such as school, work, reading, or watching TV -   Moving or speaking so slowly that other people could have noticed; or the opposite being so fidgety that others notice -   Thoughts of being better off dead, or hurting yourself in some way -   PHQ 9 Score -   How difficult have these problems made it for you to do your work, take care of your home and get along with others -       No flowsheet data found. Abuse Screening Questionnaire 8/27/2021   Do you ever feel afraid of your partner? N   Are you in a relationship with someone who physically or mentally threatens you? N   Is it safe for you to go home? Y         1. Have you been to the ER, urgent care clinic since your last visit? Hospitalized since your last visit?no    2. Have you seen or consulted any other health care providers outside of the [address] [place] [address] since your last visit?   Include any pap smears or colon screening. no

## 2021-08-27 NOTE — PROGRESS NOTES
Family Medicine Follow-Up Progress Note  Patient: Kimani Espinoza  1980, 47 y.o., female  Encounter Date: 8/27/2021    ASSESSMENT & PLAN    ICD-10-CM ICD-9-CM    1. Encounter for monitoring diuretic therapy  R74.91 C49.81 METABOLIC PANEL, BASIC    R41.938 V58.69    2. Essential hypertension  O66 429.2 METABOLIC PANEL, BASIC   3. Edema, unspecified type  G52.8 801.6 METABOLIC PANEL, BASIC       Orders Placed This Encounter    METABOLIC PANEL, BASIC     Standing Status:   Future     Standing Expiration Date:   8/27/2022    lisinopril-hydroCHLOROthiazide (PRINZIDE, ZESTORETIC) 10-12.5 mg per tablet     Sig: Take 1 Tablet by mouth daily. Dispense:  30 Tablet     Refill:  1     To replace hctz       Patient Instructions   Lab 2 wk  Change from hctz to lisinopril hctz to minimize k loss  Will be ok to use lasix prn  Low sodium, high potassium diet  Recheck in office 2 wk      CHIEF COMPLAINT  Chief Complaint   Patient presents with    Follow-up     3 mo    Hypertension    Medication Evaluation     questions on taking lasix and HCTZ together        SUBJECTIVE  Kimani Espinoza is a 47 y.o. female presenting today for follow up  HTN: patient reports stable on medications. No chest pain, sob, headache, blurred vision, leg swelling, diaphoresis, falls. Compliant with medications as prescribed. Is sometimes checking blood pressures at home and reports range is 120-130/70-80. No significant hyper or hypotensive episodes that the patient reports today. Having some swelling hands and feet  Taking lasix on some days, holds her hctz on those days      ROS  Review of Systems  A 12 point review of systems was negative except as noted here or in the HPI.     OBJECTIVE  Visit Vitals  /72 (BP 1 Location: Left upper arm, BP Patient Position: Sitting)   Pulse 90   Temp 98.1 °F (36.7 °C) (Temporal)   Resp 14   Ht 5' (1.524 m)   Wt 165 lb (74.8 kg)   LMP 05/01/2020 (Exact Date)   SpO2 99%   BMI 32.22 kg/m²       Physical Exam  Vitals and nursing note reviewed. Constitutional:       General: She is not in acute distress. Appearance: Normal appearance. She is not ill-appearing, toxic-appearing or diaphoretic. HENT:      Head: Normocephalic and atraumatic. Right Ear: External ear normal.      Left Ear: External ear normal.   Eyes:      General: No scleral icterus. Right eye: No discharge. Left eye: No discharge. Comments: eom grossly intact   Neck:      Comments: No visible neck masses , ROM appears normal from visual inspection  Cardiovascular:      Rate and Rhythm: Normal rate and regular rhythm. Pulses: Normal pulses. Heart sounds: Normal heart sounds. No murmur heard. No friction rub. No gallop. Pulmonary:      Effort: Pulmonary effort is normal. No respiratory distress. Breath sounds: Normal breath sounds. No stridor. No wheezing, rhonchi or rales. Abdominal:      General: Bowel sounds are normal.      Palpations: Abdomen is soft. Musculoskeletal:      Comments: Non pitting mild hand and foot edema  Pictures on phone of pitting ankle edema   Skin:     Comments: Visible skin is without jaundice, bruising, lesion, pallor, erythema or rash except as otherwise noted   Neurological:      General: No focal deficit present. Mental Status: She is alert and oriented to person, place, and time. Psychiatric:         Mood and Affect: Mood normal.         Behavior: Behavior normal.         Thought Content: Thought content normal.         Judgment: Judgment normal.         No results found for any visits on 08/27/21.     HISTORICAL  Reviewed and updated today, and as noted below:    Past Medical History:   Diagnosis Date    Depression     Essential hypertension 5/9/2017    Takes HCTZ daily    Hypothyroidism, adult 9/11/2015    Motion sickness     Radiculopathy of cervical region 9/11/2015     Past Surgical History:   Procedure Laterality Date    HX GYN  2007    D&C, tubal ligation    HX GYN  2020    hystorectomy    HX ORTHOPAEDIC Left 01/2018    Foot surgery / Callus removed      Family History   Problem Relation Age of Onset    Hypertension Mother     Other Father         Hypercholesterol    Anesth Problems Neg Hx      Social History     Tobacco Use   Smoking Status Never Smoker   Smokeless Tobacco Never Used     Social History     Socioeconomic History    Marital status:      Spouse name: Not on file    Number of children: Not on file    Years of education: Not on file    Highest education level: Not on file   Tobacco Use    Smoking status: Never Smoker    Smokeless tobacco: Never Used   Vaping Use    Vaping Use: Never used   Substance and Sexual Activity    Alcohol use: Yes     Comment: occasionally    Drug use: No     Social Determinants of Health     Financial Resource Strain:     Difficulty of Paying Living Expenses:    Food Insecurity:     Worried About Running Out of Food in the Last Year:     Ran Out of Food in the Last Year:    Transportation Needs:     Lack of Transportation (Medical):      Lack of Transportation (Non-Medical):    Physical Activity:     Days of Exercise per Week:     Minutes of Exercise per Session:    Stress:     Feeling of Stress :    Social Connections:     Frequency of Communication with Friends and Family:     Frequency of Social Gatherings with Friends and Family:     Attends [employer] Services:     Active Member of Clubs or Organizations:     Attends Club or Organization Meetings:     Marital Status:      Allergies   Allergen Reactions    Flagyl [Metronidazole] Diarrhea       LAB REVIEW  Lab Results   Component Value Date/Time    Sodium 139 05/20/2021 09:56 AM    Potassium 4.0 05/20/2021 09:56 AM    Chloride 109 (H) 05/20/2021 09:56 AM    CO2 25 05/20/2021 09:56 AM    Anion gap 5 05/20/2021 09:56 AM    Glucose 81 05/20/2021 09:56 AM    BUN 10 05/20/2021 09:56 AM    Creatinine 0.75 05/20/2021 09:56 AM BUN/Creatinine ratio 13 05/20/2021 09:56 AM    GFR est AA >60 05/20/2021 09:56 AM    GFR est non-AA >60 05/20/2021 09:56 AM    Calcium 8.3 (L) 05/20/2021 09:56 AM    Bilirubin, total 0.5 05/20/2021 09:56 AM    Alk. phosphatase 73 05/20/2021 09:56 AM    Protein, total 6.9 05/20/2021 09:56 AM    Albumin 3.6 05/20/2021 09:56 AM    Globulin 3.3 05/20/2021 09:56 AM    A-G Ratio 1.1 05/20/2021 09:56 AM    ALT (SGPT) 20 05/20/2021 09:56 AM     Lab Results   Component Value Date/Time    WBC 7.1 05/20/2021 09:56 AM    Hemoglobin (POC) 12.9 05/27/2020 06:40 AM    HGB 12.4 05/20/2021 09:56 AM    HCT 37.8 05/20/2021 09:56 AM    PLATELET 776 68/36/8189 09:56 AM    MCV 96.2 05/20/2021 09:56 AM     No results found for: HBA1C, XFM8UIII, UQD8CVUX, AEM0YLUT  Lab Results   Component Value Date/Time    Cholesterol, total 204 (H) 05/20/2021 09:56 AM    HDL Cholesterol 46 05/20/2021 09:56 AM    LDL, calculated 133.6 (H) 05/20/2021 09:56 AM    VLDL, calculated 24.4 05/20/2021 09:56 AM    Triglyceride 122 05/20/2021 09:56 AM    CHOL/HDL Ratio 4.4 05/20/2021 09:56 AM           Cathy Taylor MD  [facility]  08/27/21 10:38 AM    Portions of this note may have been populated using smart dictation software and may have \"sounds-like\" errors present. Pt was counseled on risks, benefits and alternatives of treatment options. All questions were asked and answered and the patient was agreeable with the treatment plan as outlined.

## 2021-09-23 RX ORDER — HYDRALAZINE HYDROCHLORIDE 25 MG/1
TABLET, FILM COATED ORAL
Qty: 90 TABLET | Refills: 0 | Status: SHIPPED | OUTPATIENT
Start: 2021-09-23 | End: 2021-10-21

## 2021-09-23 RX ORDER — LISINOPRIL AND HYDROCHLOROTHIAZIDE 10; 12.5 MG/1; MG/1
TABLET ORAL
Qty: 30 TABLET | Refills: 1 | Status: SHIPPED | OUTPATIENT
Start: 2021-09-23 | End: 2021-10-21

## 2021-10-21 RX ORDER — LISINOPRIL AND HYDROCHLOROTHIAZIDE 10; 12.5 MG/1; MG/1
TABLET ORAL
Qty: 30 TABLET | Refills: 1 | Status: SHIPPED | OUTPATIENT
Start: 2021-10-21 | End: 2021-11-16

## 2021-10-21 RX ORDER — HYDRALAZINE HYDROCHLORIDE 25 MG/1
TABLET, FILM COATED ORAL
Qty: 90 TABLET | Refills: 0 | Status: SHIPPED | OUTPATIENT
Start: 2021-10-21 | End: 2021-11-16

## 2021-11-16 RX ORDER — HYDRALAZINE HYDROCHLORIDE 25 MG/1
TABLET, FILM COATED ORAL
Qty: 90 TABLET | Refills: 0 | Status: SHIPPED | OUTPATIENT
Start: 2021-11-16 | End: 2021-12-16

## 2021-11-16 RX ORDER — LISINOPRIL AND HYDROCHLOROTHIAZIDE 10; 12.5 MG/1; MG/1
TABLET ORAL
Qty: 30 TABLET | Refills: 1 | Status: SHIPPED | OUTPATIENT
Start: 2021-11-16 | End: 2022-01-04 | Stop reason: SDUPTHER

## 2021-12-16 RX ORDER — HYDRALAZINE HYDROCHLORIDE 25 MG/1
TABLET, FILM COATED ORAL
Qty: 90 TABLET | Refills: 0 | Status: SHIPPED | OUTPATIENT
Start: 2021-12-16 | End: 2022-06-27

## 2022-01-04 RX ORDER — LISINOPRIL AND HYDROCHLOROTHIAZIDE 10; 12.5 MG/1; MG/1
1 TABLET ORAL DAILY
Qty: 30 TABLET | Refills: 1 | Status: SHIPPED | OUTPATIENT
Start: 2022-01-04 | End: 2022-01-28

## 2022-01-28 RX ORDER — LISINOPRIL AND HYDROCHLOROTHIAZIDE 10; 12.5 MG/1; MG/1
TABLET ORAL
Qty: 30 TABLET | Refills: 1 | Status: SHIPPED | OUTPATIENT
Start: 2022-01-28 | End: 2022-02-27

## 2022-02-04 ENCOUNTER — VIRTUAL VISIT (OUTPATIENT)
Dept: FAMILY MEDICINE CLINIC | Age: 42
End: 2022-02-04
Payer: COMMERCIAL

## 2022-02-04 DIAGNOSIS — R51.9 NONINTRACTABLE HEADACHE, UNSPECIFIED CHRONICITY PATTERN, UNSPECIFIED HEADACHE TYPE: ICD-10-CM

## 2022-02-04 DIAGNOSIS — R09.89 RESPIRATORY SYMPTOMS: Primary | ICD-10-CM

## 2022-02-04 PROCEDURE — 99213 OFFICE O/P EST LOW 20 MIN: CPT | Performed by: FAMILY MEDICINE

## 2022-02-04 RX ORDER — BENZONATATE 200 MG/1
200 CAPSULE ORAL
Qty: 30 CAPSULE | Refills: 0 | Status: SHIPPED | OUTPATIENT
Start: 2022-02-04 | End: 2022-06-27

## 2022-02-04 RX ORDER — IBUPROFEN 600 MG/1
600 TABLET ORAL
Qty: 20 TABLET | Refills: 0 | Status: SHIPPED | OUTPATIENT
Start: 2022-02-04 | End: 2022-09-06

## 2022-02-04 NOTE — PROGRESS NOTES
Sue Cancino is a 46 y.o. female who was seen by synchronous (real-time) audio-video technology on 2/4/2022. Assessment & Plan:   Diagnoses and all orders for this visit:    1. Respiratory symptoms  -     benzonatate (TESSALON) 200 mg capsule; Take 1 Capsule by mouth three (3) times daily as needed for Cough. 2. Nonintractable headache, unspecified chronicity pattern, unspecified headache type  -     ibuprofen (MOTRIN) 600 mg tablet; Take 1 Tablet by mouth every six (6) hours as needed for Pain. Possible COVID-19   Rest, push fluids  Tessalon prn  Motrin prn  She is going to get COVID-19 testing today  Isolation until proven negative    Follow-up and Dispositions    · Return if symptoms worsen or fail to improve. Reviewed plan of care. Patient has provided input and agrees with goals. CPT Codes 24747-14504 for Established Patients may apply to this Telehealth Visit      Subjective:   Sue Cancino was seen for Headache and Cough      Patient presents with:  Headache  Cough    Her symptoms started 4 days ago and she is getting worse. She has had her COVID-19 vaccine. Review of Systems   Constitutional: Negative for chills, fever and malaise/fatigue. HENT: Positive for sinus pain. Negative for congestion, ear pain and sore throat. No loss of taste or smell   Respiratory: Negative for cough, sputum production, shortness of breath and wheezing. Cardiovascular: Negative for chest pain. Gastrointestinal: Negative for abdominal pain, diarrhea, nausea and vomiting. Musculoskeletal: Negative for myalgias. Neurological: Positive for headaches. Objective:   /101, P 81    Physical Exam  Constitutional:       General: She is not in acute distress. Appearance: Normal appearance. Pulmonary:      Effort: Pulmonary effort is normal.   Neurological:      Mental Status: She is alert and oriented to person, place, and time.          Due to this being a TeleHealth evaluation, many elements of the physical examination are unable to be assessed. We discussed the expected course, resolution and complications of the diagnosis(es) in detail. Medication risks, benefits, costs, interactions, and alternatives were discussed as indicated. I advised her to contact the office if her condition worsens, changes or fails to improve as anticipated. She expressed understanding with the diagnosis(es) and plan. Pursuant to the emergency declaration under the [address] [place] [address], [facility] waiver authority and the [employer] and [employer] Act, this Virtual  Visit was conducted, with patient's consent, to reduce the patient's risk of exposure to COVID-19 and provide continuity of care for an established patient. Services were provided through a video synchronous discussion virtually to substitute for in-person clinic visit.     Valentina Reyes MD

## 2022-02-04 NOTE — PROGRESS NOTES
Chief Complaint   Patient presents with    Headache    Cough     1. Have you been to the ER, urgent care clinic since your last visit? Hospitalized since your last visit? No    2. Have you seen or consulted any other health care providers outside of the [address] [place] [address] since your last visit? Include any pap smears or colon screening.  No

## 2022-02-27 RX ORDER — LISINOPRIL AND HYDROCHLOROTHIAZIDE 10; 12.5 MG/1; MG/1
TABLET ORAL
Qty: 30 TABLET | Refills: 1 | Status: SHIPPED | OUTPATIENT
Start: 2022-02-27 | End: 2022-05-12

## 2022-02-27 NOTE — TELEPHONE ENCOUNTER
Pt has pending electrolyte/lab recheck (already ordered)  Please ask for her to go for this before next refill is due for safety monitoring

## 2022-03-19 PROBLEM — I10 ESSENTIAL HYPERTENSION: Status: ACTIVE | Noted: 2017-05-09

## 2022-04-06 LAB — SARS-COV-2, NAA: NEGATIVE

## 2022-05-12 RX ORDER — LISINOPRIL AND HYDROCHLOROTHIAZIDE 10; 12.5 MG/1; MG/1
TABLET ORAL
Qty: 30 TABLET | Refills: 1 | Status: SHIPPED | OUTPATIENT
Start: 2022-05-12 | End: 2022-06-09

## 2022-05-12 NOTE — TELEPHONE ENCOUNTER
Far overdue for the lab recheck I ordered  Please have patient draw pending labs and then follow up with me in office  I cannot continue to refill medications without this compliance  Please communicate this to her

## 2022-05-17 ENCOUNTER — TELEPHONE (OUTPATIENT)
Dept: FAMILY MEDICINE CLINIC | Age: 42
End: 2022-05-17

## 2022-05-17 NOTE — TELEPHONE ENCOUNTER
Patient has a question and would like to have the DrAlison Call her back. She needs to explain it to her. She will not give any other information as to what is is about.   Call her at [phone]

## 2022-05-17 NOTE — TELEPHONE ENCOUNTER
Could schedule appt but unless injury/acute illness would not likely intercede on gym membership dispute from a medical standpoint

## 2022-05-17 NOTE — TELEPHONE ENCOUNTER
Pt wants to get out of her gym membership and would harriett a Doctors note stating she can not work out for the next 2-3 weeks.

## 2022-06-27 ENCOUNTER — VIRTUAL VISIT (OUTPATIENT)
Dept: FAMILY MEDICINE CLINIC | Age: 42
End: 2022-06-27
Payer: COMMERCIAL

## 2022-06-27 DIAGNOSIS — E03.8 SUBCLINICAL HYPOTHYROIDISM: ICD-10-CM

## 2022-06-27 DIAGNOSIS — I10 ESSENTIAL HYPERTENSION: Primary | ICD-10-CM

## 2022-06-27 DIAGNOSIS — E78.49 OTHER HYPERLIPIDEMIA: ICD-10-CM

## 2022-06-27 DIAGNOSIS — E03.9 HYPOTHYROIDISM, ADULT: ICD-10-CM

## 2022-06-27 PROCEDURE — 99214 OFFICE O/P EST MOD 30 MIN: CPT | Performed by: FAMILY MEDICINE

## 2022-06-27 NOTE — PROGRESS NOTES
Chief Complaint   Patient presents with    Hypertension    Follow-up     Patient states that she has stopped taking her anti depressant medications. 1. Have you been to the ER, urgent care clinic since your last visit? Hospitalized since your last visit? No    2. Have you seen or consulted any other health care providers outside of the [address] [place] [address] since your last visit? Include any pap smears or colon screening.  No

## 2022-06-27 NOTE — PROGRESS NOTES
Brady Ham is a 46 y.o. female who was seen by synchronous (real-time) audio-video technology on 6/27/2022. Consent: Brady Ham, who was seen by synchronous (real-time) audio-video technology, and/or her healthcare decision maker, is aware that this patient-initiated, Telehealth encounter on 6/27/2022 is a billable service, with coverage as determined by her insurance carrier. She is aware that she may receive a bill and has provided verbal consent to proceed: Yes. Assessment & Plan:       ICD-10-CM ICD-9-CM    1. Essential hypertension  I10 401.9 CBC W/O DIFF      METABOLIC PANEL, COMPREHENSIVE      LIPID PANEL   2. Hypothyroidism, adult  E03.9 244.9    3. Subclinical hypothyroidism  E03.8 244.8 TSH 3RD GENERATION   4. Other hyperlipidemia  F65.78 276.6 METABOLIC PANEL, COMPREHENSIVE      LIPID PANEL     bp at goal  Due for labs  Hx hypothyroid v subclinical  Recommend recheck tsh  Due for lipid check, fasting  Encouraged by healthy habits, working out, slowly losing weight, great work!!        Pt was counseled on risks, benefits and alternatives of treatment options. All questions were asked and answered and the patient was agreeable with the treatment plan as outlined. Subjective:   Brady Ham is a 46 y.o. female who was seen for Hypertension and Follow-up (Patient states that she has stopped taking her anti depressant medications.)      HTN: patient reports stable on medications. No chest pain, sob, headache, blurred vision, leg swelling, diaphoresis, falls. Compliant with medications as prescribed. is checking blood pressures at home and reports range is 120-130/80s. No significant hyper or hypotensive episodes that the patient reports today.     Has been able to stop celexa, takes just prn atarax right now    Hasn't been needing the leg swelling tx with lasix    Seeing a  2x a week  Weight is going down  She tells me her highest weight was 166, she's now down to 159, just the last 4 months      Medications, allergies, PMH, PSH, SOCH, [address] reviewed and updated per routine protocol, see chart for review and changes if not noted here. ROS  A 12 point review of systems was negative except as noted here or in the HPI.     Objective:   Vital Signs: (As obtained by patient/caregiver at home)  Patient-Reported Vitals 6/27/2022   Patient-Reported Weight 159lb   Patient-Reported Pulse 81   Patient-Reported Systolic  600   Patient-Reported Diastolic 86        [INSTRUCTIONS:  \"[x]\" Indicates a positive item  \"[]\" Indicates a negative item  -- DELETE ALL ITEMS NOT EXAMINED]    Constitutional: [x] Appears well-developed and well-nourished [x] No apparent distress      [] Abnormal -     Mental status: [x] Alert and awake  [x] Oriented to person/place/time [x] Able to follow commands    [] Abnormal -     Eyes:   EOM    [x]  Normal    [] Abnormal -   Sclera  [x]  Normal    [] Abnormal -          Discharge [x]  None visible   [] Abnormal -     HENT: [x] Normocephalic, atraumatic  [] Abnormal -   [x] Mouth/Throat: Mucous membranes are moist    External Ears [x] Normal  [] Abnormal -    Neck: [x] No visualized mass [] Abnormal -     Pulmonary/Chest: [x] Respiratory effort normal   [x] No visualized signs of difficulty breathing or respiratory distress        [] Abnormal -      Musculoskeletal:   [] Normal gait with no signs of ataxia         [x] Normal range of motion of neck        [] Abnormal -     Neurological:        [x] No Facial Asymmetry (Cranial nerve 7 motor function) (limited exam due to video visit)          [x] No gaze palsy        [] Abnormal -          Skin:        [x] No significant exanthematous lesions or discoloration noted on facial skin         [] Abnormal -            Psychiatric:       [x] Normal Affect [] Abnormal -        [x] No Hallucinations    Other pertinent observable physical exam findings:seated no distress    We discussed the expected course, resolution and complications of the diagnosis(es) in detail. Medication risks, benefits, costs, interactions, and alternatives were discussed as indicated. I advised her to contact the office if her condition worsens, changes or fails to improve as anticipated. She expressed understanding with the diagnosis(es) and plan. Rk Watson is a 46 y.o. female who was evaluated by a video visit encounter for concerns as above. Patient identification was verified prior to start of the visit. A caregiver was present when appropriate. Due to this being a TeleHealth encounter (During GHOSS-35 public health emergency), evaluation of the following organ systems was limited: Vitals/Constitutional/EENT/Resp/CV/GI//MS/Neuro/Skin/Heme-Lymph-Imm. Pursuant to the emergency declaration under the [facility], [facility] waiver authority and the [employer] and [employer] Act, this Virtual  Visit was conducted, with patient's (and/or legal guardian's) consent, to reduce the patient's risk of exposure to COVID-19 and provide necessary medical care. Services were provided through a video synchronous discussion virtually to substitute for in-person clinic visit. Patient and provider were located at their individual homes. Fox Edward MD  [facility]  06/27/22 1:04 PM     Portions of this note may have been populated using smart dictation software and may have \"sounds-like\" errors present.

## 2022-07-10 ENCOUNTER — APPOINTMENT (OUTPATIENT)
Dept: GENERAL RADIOLOGY | Age: 42
End: 2022-07-10
Attending: NURSE PRACTITIONER
Payer: COMMERCIAL

## 2022-07-10 ENCOUNTER — HOSPITAL ENCOUNTER (EMERGENCY)
Age: 42
Discharge: HOME | End: 2022-07-10
Attending: STUDENT IN AN ORGANIZED HEALTH CARE EDUCATION/TRAINING PROGRAM
Payer: COMMERCIAL

## 2022-07-10 VITALS
RESPIRATION RATE: 16 BRPM | SYSTOLIC BLOOD PRESSURE: 120 MMHG | DIASTOLIC BLOOD PRESSURE: 79 MMHG | HEART RATE: 78 BPM | HEIGHT: 60 IN | BODY MASS INDEX: 31.41 KG/M2 | WEIGHT: 160 LBS | TEMPERATURE: 98.5 F | OXYGEN SATURATION: 99 %

## 2022-07-10 DIAGNOSIS — S39.012A LUMBAR STRAIN, INITIAL ENCOUNTER: ICD-10-CM

## 2022-07-10 DIAGNOSIS — S43.401A SPRAIN OF RIGHT SHOULDER, UNSPECIFIED SHOULDER SPRAIN TYPE, INITIAL ENCOUNTER: ICD-10-CM
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
## 2022-07-10 PROCEDURE — 99283 EMERGENCY DEPT VISIT LOW MDM: CPT

## 2022-07-10 PROCEDURE — 72100 X-RAY EXAM L-S SPINE 2/3 VWS: CPT

## 2022-07-10 PROCEDURE — 73030 X-RAY EXAM OF SHOULDER: CPT

## 2022-07-10 RX ORDER — CYCLOBENZAPRINE HCL 10 MG
10 TABLET ORAL
Qty: 15 TABLET | Refills: 0 | Status: SHIPPED | OUTPATIENT
Start: 2022-07-10 | End: 2022-09-20
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
## 2022-07-10 NOTE — Clinical Note
[address]  [facility] EMERGENCY DEPT  [address] [id number]  [phone]    Work/School Note    Date: 7/10/2022    To Whom It May concern:    Ronald Tidwell was seen and treated today in the emergency room by the following provider(s):  Attending Provider: Jolynn Manzano DO  Nurse Practitioner: Fatuma Cedeño NP. Ronald Tidwell is excused from work/school on 7/10/2022 through 7/12/2022. She is medically clear to return to work/school on 7/13/2022.          Sincerely,          Katerina Nielsen NP
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
## 2022-07-18 ENCOUNTER — VIRTUAL VISIT (OUTPATIENT)
Dept: FAMILY MEDICINE CLINIC | Age: 42
End: 2022-07-18
Payer: COMMERCIAL

## 2022-07-18 DIAGNOSIS — M54.50 ACUTE BILATERAL LOW BACK PAIN WITHOUT SCIATICA: ICD-10-CM
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
## 2022-07-18 DIAGNOSIS — M25.511 ACUTE PAIN OF RIGHT SHOULDER: ICD-10-CM

## 2022-07-18 PROCEDURE — 99214 OFFICE O/P EST MOD 30 MIN: CPT | Performed by: FAMILY MEDICINE

## 2022-07-18 RX ORDER — DICLOFENAC SODIUM 75 MG/1
75 TABLET, DELAYED RELEASE ORAL
Qty: 60 TABLET | Refills: 1 | Status: SHIPPED | OUTPATIENT
Start: 2022-07-18 | End: 2022-09-20 | Stop reason: SDUPTHER
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
## 2022-08-16 ENCOUNTER — OFFICE VISIT (OUTPATIENT)
Dept: FAMILY MEDICINE CLINIC | Age: 42
End: 2022-08-16
Payer: COMMERCIAL

## 2022-08-16 VITALS
RESPIRATION RATE: 18 BRPM | HEART RATE: 78 BPM | DIASTOLIC BLOOD PRESSURE: 84 MMHG | BODY MASS INDEX: 31.41 KG/M2 | OXYGEN SATURATION: 99 % | SYSTOLIC BLOOD PRESSURE: 138 MMHG | WEIGHT: 160 LBS | TEMPERATURE: 97.4 F | HEIGHT: 60 IN

## 2022-08-16 DIAGNOSIS — I10 ESSENTIAL HYPERTENSION: Primary | ICD-10-CM

## 2022-08-16 PROCEDURE — 99214 OFFICE O/P EST MOD 30 MIN: CPT | Performed by: FAMILY MEDICINE

## 2022-08-16 RX ORDER — LISINOPRIL AND HYDROCHLOROTHIAZIDE 20; 25 MG/1; MG/1
1 TABLET ORAL DAILY
Qty: 30 TABLET | Refills: 1 | Status: SHIPPED | OUTPATIENT
Start: 2022-08-16 | End: 2022-09-12
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
## 2022-08-16 NOTE — PATIENT INSTRUCTIONS
BP, not at goal yet: increase lisinopril hctz to 20-25  OK to take 2 of the 10-12.5 at a time in the morning to make TDD  I have sent updated script  Recheck in 2-4 weeks in our office  When we hit the right dose we will recheck electrolytes again, to make sure of stability  Recent labs looked good though

## 2022-08-16 NOTE — PROGRESS NOTES
Family Medicine Follow-Up Progress Note  Patient: Ronald Tidwell  1980, 46 y.o., female  Encounter Date: 8/16/2022    ASSESSMENT & PLAN    ICD-10-CM ICD-9-CM    1. Essential hypertension  I10 401.9 lisinopril-hydroCHLOROthiazide (PRINZIDE, ZESTORETIC) 20-25 mg per tablet          Orders Placed This Encounter    lisinopril-hydroCHLOROthiazide (PRINZIDE, ZESTORETIC) 20-25 mg per tablet     Sig: Take 1 Tablet by mouth in the morning. Dispense:  30 Tablet     Refill:  1       Patient Instructions   BP: increase lisinopril hctz to 20-25  OK to take 2 of the 10-12.5 at a time in the morning to make TDD  I have sent updated script  Recheck in 2-4 weeks in our office  When we hit the right dose we will recheck electrolytes again, to make sure of stability  Recent labs looked good though    CHIEF COMPLAINT  Chief Complaint   Patient presents with    Hypertension     Htn fu med compliant and still elevated;  some issues with depression when she doesn't take her meds     noc at this time          [address] [facility] is a 46 y.o. female presenting today for bp follow up  Bp been running high  More headaches, maybe cervicogenic, having cervical radiculopathy and low back pain without sciatica, started PT today    [facility]    Is taking nsaids    Is having pain  Has had to use atarax prn recently, this is effective for her for anxiety symptoms    She says she's been unable to do the things she wants to do, she has to do paperwork through the spine doc--she hasn't been able to do her work  (Works at [employer], has a fairly physical job)        ROS  Review of Systems  A 12 point review of systems was negative except as noted here or in the HPI. OBJECTIVE  Visit Vitals  /84   Pulse 78   Temp 97.4 °F (36.3 °C) (Temporal)   Resp 18   Ht 5' (1.524 m)   Wt 160 lb (72.6 kg)   LMP 05/01/2020 (Exact Date)   SpO2 99%   BMI 31.25 kg/m²       Physical Exam  Vitals and nursing note reviewed.    Constitutional: General: She is not in acute distress. Appearance: Normal appearance. She is not ill-appearing, toxic-appearing or diaphoretic. HENT:      Head: Normocephalic and atraumatic. Right Ear: External ear normal.      Left Ear: External ear normal.      Mouth/Throat:      Mouth: Mucous membranes are moist.   Eyes:      General: No scleral icterus. Right eye: No discharge. Left eye: No discharge. Comments: eom grossly intact   Neck:      Comments: No visible neck masses , ROM appears normal from visual inspection  Pulmonary:      Effort: Pulmonary effort is normal. No respiratory distress. Musculoskeletal:         General: Tenderness (upper back and neck muscles with spasm) present. Skin:     Comments: Visible skin is without jaundice, bruising, lesion, pallor, erythema or rash except as otherwise noted   Neurological:      General: No focal deficit present. Mental Status: She is alert and oriented to person, place, and time. Psychiatric:         Mood and Affect: Mood normal.         Behavior: Behavior normal.         Thought Content: Thought content normal.         Judgment: Judgment normal.       No results found for any visits on 08/16/22.     HISTORICAL  Reviewed and updated today, and as noted below:    Past Medical History:   Diagnosis Date    Depression     Essential hypertension 5/9/2017    Takes HCTZ daily    Hypothyroidism, adult 9/11/2015    Motion sickness     Radiculopathy of cervical region 9/11/2015     Past Surgical History:   Procedure Laterality Date    HX GYN  2007    D&C, tubal ligation    HX GYN  2020    hystorectomy    HX ORTHOPAEDIC Left 01/2018    Foot surgery / Callus removed      Family History   Problem Relation Age of Onset    Hypertension Mother     Other Father         Hypercholesterol    Anesth Problems Neg Hx      Social History     Tobacco Use   Smoking Status Never   Smokeless Tobacco Never     Social History     Socioeconomic History Marital status:    Tobacco Use    Smoking status: Never    Smokeless tobacco: Never   Vaping Use    Vaping Use: Never used   Substance and Sexual Activity    Alcohol use: Yes     Comment: occasionally    Drug use: No     Allergies   Allergen Reactions    Flagyl [Metronidazole] Diarrhea       LAB REVIEW  Lab Results   Component Value Date/Time    Sodium 139 08/11/2022 11:50 AM    Potassium 4.5 08/11/2022 11:50 AM    Chloride 107 08/11/2022 11:50 AM    CO2 27 08/11/2022 11:50 AM    Anion gap 5 08/11/2022 11:50 AM    Glucose 87 08/11/2022 11:50 AM    BUN 14 08/11/2022 11:50 AM    Creatinine 0.89 08/11/2022 11:50 AM    BUN/Creatinine ratio 16 08/11/2022 11:50 AM    GFR est AA >60 08/11/2022 11:50 AM    GFR est non-AA >60 08/11/2022 11:50 AM    Calcium 9.2 08/11/2022 11:50 AM    Bilirubin, total 0.4 08/11/2022 11:50 AM    Alk. phosphatase 64 08/11/2022 11:50 AM    Protein, total 7.0 08/11/2022 11:50 AM    Albumin 3.8 08/11/2022 11:50 AM    Globulin 3.2 08/11/2022 11:50 AM    A-G Ratio 1.2 08/11/2022 11:50 AM    ALT (SGPT) 16 08/11/2022 11:50 AM     Lab Results   Component Value Date/Time    WBC 6.0 08/11/2022 11:50 AM    Hemoglobin (POC) 12.9 05/27/2020 06:40 AM    HGB 13.1 08/11/2022 11:50 AM    HCT 40.4 08/11/2022 11:50 AM    PLATELET 218 41/09/8503 11:50 AM    MCV 98.5 08/11/2022 11:50 AM     No results found for: HBA1C, RUU0UHWO, BPK6XZQX, WAY1JGMZ  Lab Results   Component Value Date/Time    Cholesterol, total 197 08/11/2022 11:50 AM    HDL Cholesterol 48 08/11/2022 11:50 AM    LDL, calculated 136.2 (H) 08/11/2022 11:50 AM    VLDL, calculated 12.8 08/11/2022 11:50 AM    Triglyceride 64 08/11/2022 11:50 AM    CHOL/HDL Ratio 4.1 08/11/2022 11:50 AM           Epifanio Dillard MD  [facility]  08/16/22 2:57 PM    Portions of this note may have been populated using smart dictation software and may have \"sounds-like\" errors present.      Pt was counseled on risks, benefits and alternatives of treatment options. All questions were asked and answered and the patient was agreeable with the treatment plan as outlined.

## 2022-09-06 ENCOUNTER — OFFICE VISIT (OUTPATIENT)
Dept: FAMILY MEDICINE CLINIC | Age: 42
End: 2022-09-06
Payer: COMMERCIAL

## 2022-09-06 VITALS
HEART RATE: 78 BPM | SYSTOLIC BLOOD PRESSURE: 134 MMHG | DIASTOLIC BLOOD PRESSURE: 89 MMHG | WEIGHT: 156 LBS | BODY MASS INDEX: 30.63 KG/M2 | OXYGEN SATURATION: 99 % | RESPIRATION RATE: 20 BRPM | HEIGHT: 60 IN | TEMPERATURE: 98.4 F

## 2022-09-06 DIAGNOSIS — R60.0 LEG EDEMA: Primary | ICD-10-CM

## 2022-09-06 DIAGNOSIS — G44.86 CERVICOGENIC HEADACHE: ICD-10-CM
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
## 2022-09-06 DIAGNOSIS — M25.511 ACUTE PAIN OF RIGHT SHOULDER: ICD-10-CM

## 2022-09-06 DIAGNOSIS — M54.50 ACUTE BILATERAL LOW BACK PAIN WITHOUT SCIATICA: ICD-10-CM

## 2022-09-06 DIAGNOSIS — M54.12 CERVICAL RADICULOPATHY: ICD-10-CM

## 2022-09-06 PROCEDURE — 99214 OFFICE O/P EST MOD 30 MIN: CPT | Performed by: FAMILY MEDICINE

## 2022-09-06 RX ORDER — FUROSEMIDE 20 MG/1
20 TABLET ORAL
Qty: 30 TABLET | Refills: 0 | Status: SHIPPED | OUTPATIENT
Start: 2022-09-06 | End: 2022-09-29

## 2022-09-06 NOTE — PATIENT INSTRUCTIONS
FMLA completed  She can try to go back to work may need accomodations like chance to sit/rest and may not be able to lift >20 lbs at a time right now    Lisinopril hctz is working in spite of all of this    Add lasix x 5 days  Wear compression socks at work
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
## 2022-09-10 DIAGNOSIS — I10 ESSENTIAL HYPERTENSION: ICD-10-CM

## 2022-09-12 RX ORDER — LISINOPRIL AND HYDROCHLOROTHIAZIDE 20; 25 MG/1; MG/1
TABLET ORAL
Qty: 30 TABLET | Refills: 1 | Status: SHIPPED | OUTPATIENT
Start: 2022-09-12 | End: 2022-09-20

## 2022-09-20 ENCOUNTER — OFFICE VISIT (OUTPATIENT)
Dept: FAMILY MEDICINE CLINIC | Age: 42
End: 2022-09-20
Payer: COMMERCIAL

## 2022-09-20 VITALS
HEART RATE: 80 BPM | HEIGHT: 60 IN | DIASTOLIC BLOOD PRESSURE: 77 MMHG | BODY MASS INDEX: 31.8 KG/M2 | WEIGHT: 162 LBS | TEMPERATURE: 97.7 F | OXYGEN SATURATION: 99 % | SYSTOLIC BLOOD PRESSURE: 127 MMHG

## 2022-09-20 DIAGNOSIS — I10 ESSENTIAL HYPERTENSION: Primary | ICD-10-CM

## 2022-09-20 DIAGNOSIS — R05.8 ACE-INHIBITOR COUGH: ICD-10-CM

## 2022-09-20 DIAGNOSIS — M54.50 ACUTE BILATERAL LOW BACK PAIN WITHOUT SCIATICA: ICD-10-CM

## 2022-09-20 DIAGNOSIS — T46.4X5A ACE-INHIBITOR COUGH: ICD-10-CM
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
## 2022-09-20 DIAGNOSIS — M25.511 ACUTE PAIN OF RIGHT SHOULDER: ICD-10-CM

## 2022-09-20 DIAGNOSIS — L72.9 SUBCUTANEOUS CYST: ICD-10-CM

## 2022-09-20 PROCEDURE — 99214 OFFICE O/P EST MOD 30 MIN: CPT | Performed by: FAMILY MEDICINE

## 2022-09-20 RX ORDER — DICLOFENAC SODIUM 75 MG/1
75 TABLET, DELAYED RELEASE ORAL
Qty: 60 TABLET | Refills: 1 | Status: SHIPPED | OUTPATIENT
Start: 2022-09-20

## 2022-09-20 RX ORDER — LOSARTAN POTASSIUM AND HYDROCHLOROTHIAZIDE 25; 100 MG/1; MG/1
1 TABLET ORAL DAILY
Qty: 90 TABLET | Refills: 1 | Status: SHIPPED | OUTPATIENT
Start: 2022-09-20
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
## 2022-09-20 NOTE — PATIENT INSTRUCTIONS
BP today is beautiful  However, unfortunately lisinopril seems to be causing a dry cough  This is a SE we can mitigate  Change to LOSARTAN_HCTZ and check bp at home  If consistently <140/90 still, this Is all we need  If this is either causing symptomatic lows or you notice highs, please reach out and we will revisit mgmt choices

## 2022-09-20 NOTE — PROGRESS NOTES
Identified pt with two pt identifiers. Reviewed record in preparation for visit and have obtained necessary documentation. All patient medications has been reviewed. Chief Complaint   Patient presents with    Follow-up     2 week    Hypertension     Additional information about chief complaint:    Visit Vitals  /77 (BP 1 Location: Left upper arm, BP Patient Position: Sitting, BP Cuff Size: Large adult)   Pulse 80   Temp 97.7 °F (36.5 °C) (Temporal)   Ht 5' (1.524 m)   Wt 162 lb (73.5 kg)   SpO2 99%   BMI 31.64 kg/m²       Health Maintenance Due   Topic    COVID-19 Vaccine (1)    Flu Vaccine (1)       1. Have you been to the ER, urgent care clinic since your last visit? Hospitalized since your last visit? No    2. Have you seen or consulted any other health care providers outside of the [address] [place] [address] since your last visit? Include any pap smears or colon screening.  No

## 2022-09-29 DIAGNOSIS — R60.0 LEG EDEMA: ICD-10-CM

## 2022-09-29 RX ORDER — FUROSEMIDE 20 MG/1
20 TABLET ORAL
Qty: 30 TABLET | Refills: 0 | Status: SHIPPED | OUTPATIENT
Start: 2022-09-29

## 2023-06-28 ENCOUNTER — TELEPHONE (OUTPATIENT)
Facility: CLINIC | Age: 43
End: 2023-06-28

## 2023-08-17 ENCOUNTER — OFFICE VISIT (OUTPATIENT)
Facility: CLINIC | Age: 43
End: 2023-08-17
Payer: MEDICARE

## 2023-08-17 VITALS
BODY MASS INDEX: 32.2 KG/M2 | OXYGEN SATURATION: 99 % | SYSTOLIC BLOOD PRESSURE: 138 MMHG | DIASTOLIC BLOOD PRESSURE: 85 MMHG | HEART RATE: 77 BPM | TEMPERATURE: 98.2 F | HEIGHT: 60 IN | WEIGHT: 164 LBS

## 2023-08-17 DIAGNOSIS — J30.9 ALLERGIC RHINITIS, UNSPECIFIED SEASONALITY, UNSPECIFIED TRIGGER: ICD-10-CM

## 2023-08-17 DIAGNOSIS — E78.2 MIXED HYPERLIPIDEMIA: ICD-10-CM

## 2023-08-17 DIAGNOSIS — I10 ESSENTIAL HYPERTENSION: Primary | ICD-10-CM

## 2023-08-17 DIAGNOSIS — R09.82 POST-NASAL DRIP: ICD-10-CM

## 2023-08-17 DIAGNOSIS — Z12.31 ENCOUNTER FOR SCREENING MAMMOGRAM FOR MALIGNANT NEOPLASM OF BREAST: ICD-10-CM

## 2023-08-17 PROCEDURE — 3075F SYST BP GE 130 - 139MM HG: CPT | Performed by: FAMILY MEDICINE

## 2023-08-17 PROCEDURE — 99214 OFFICE O/P EST MOD 30 MIN: CPT | Performed by: FAMILY MEDICINE

## 2023-08-17 PROCEDURE — 3079F DIAST BP 80-89 MM HG: CPT | Performed by: FAMILY MEDICINE

## 2023-08-17 RX ORDER — FLUTICASONE PROPIONATE 50 MCG
1 SPRAY, SUSPENSION (ML) NASAL DAILY
Qty: 32 G | Refills: 1 | Status: SHIPPED | OUTPATIENT
Start: 2023-08-17

## 2023-08-17 RX ORDER — LOSARTAN POTASSIUM AND HYDROCHLOROTHIAZIDE 12.5; 5 MG/1; MG/1
1 TABLET ORAL DAILY
Qty: 90 TABLET | Refills: 1 | Status: SHIPPED | OUTPATIENT
Start: 2023-08-17

## 2023-08-17 RX ORDER — LORATADINE 10 MG/1
10 TABLET ORAL DAILY
Qty: 90 TABLET | Refills: 3 | Status: SHIPPED | OUTPATIENT
Start: 2023-08-17

## 2023-08-17 SDOH — ECONOMIC STABILITY: INCOME INSECURITY: HOW HARD IS IT FOR YOU TO PAY FOR THE VERY BASICS LIKE FOOD, HOUSING, MEDICAL CARE, AND HEATING?: PATIENT DECLINED

## 2023-08-17 SDOH — ECONOMIC STABILITY: HOUSING INSECURITY
IN THE LAST 12 MONTHS, WAS THERE A TIME WHEN YOU DID NOT HAVE A STEADY PLACE TO SLEEP OR SLEPT IN A SHELTER (INCLUDING NOW)?: PATIENT REFUSED

## 2023-08-17 SDOH — ECONOMIC STABILITY: FOOD INSECURITY: WITHIN THE PAST 12 MONTHS, YOU WORRIED THAT YOUR FOOD WOULD RUN OUT BEFORE YOU GOT MONEY TO BUY MORE.: PATIENT DECLINED

## 2023-08-17 SDOH — ECONOMIC STABILITY: TRANSPORTATION INSECURITY
IN THE PAST 12 MONTHS, HAS LACK OF TRANSPORTATION KEPT YOU FROM MEETINGS, WORK, OR FROM GETTING THINGS NEEDED FOR DAILY LIVING?: PATIENT DECLINED

## 2023-08-17 ASSESSMENT — PATIENT HEALTH QUESTIONNAIRE - PHQ9
SUM OF ALL RESPONSES TO PHQ QUESTIONS 1-9: 0
SUM OF ALL RESPONSES TO PHQ9 QUESTIONS 1 & 2: 0
SUM OF ALL RESPONSES TO PHQ QUESTIONS 1-9: 0
SUM OF ALL RESPONSES TO PHQ QUESTIONS 1-9: 0
1. LITTLE INTEREST OR PLEASURE IN DOING THINGS: 0
SUM OF ALL RESPONSES TO PHQ QUESTIONS 1-9: 0
2. FEELING DOWN, DEPRESSED OR HOPELESS: 0

## 2023-08-17 NOTE — PROGRESS NOTES
Family Medicine Follow-Up Progress Note  Patient: Concetta Stewart  1980, 38 y.o., female  Encounter Date: 8/17/2023     ASSESSMENT & PLAN    ICD-10-CM    1. Essential hypertension  I10 losartan-hydroCHLOROthiazide (HYZAAR) 50-12.5 MG per tablet     Comprehensive Metabolic Panel     Lipid Panel     CBC with Auto Differential      2. Post-nasal drip  R09.82 loratadine (CLARITIN) 10 MG tablet     fluticasone (FLONASE) 50 MCG/ACT nasal spray      3. Allergic rhinitis, unspecified seasonality, unspecified trigger  J30.9 loratadine (CLARITIN) 10 MG tablet     fluticasone (FLONASE) 50 MCG/ACT nasal spray      4. Mixed hyperlipidemia  E78.2 Comprehensive Metabolic Panel     Lipid Panel      5. Encounter for screening mammogram for malignant neoplasm of breast  Z12.31 [facility] DIGITAL SCREEN BILATERAL          Orders Placed This Encounter   Procedures    CLAUS MAYANK DIGITAL SCREEN BILATERAL     Standing Status:   Future     Standing Expiration Date:   10/17/2024    Comprehensive Metabolic Panel     Standing Status:   Future     Standing Expiration Date:   8/17/2024    Lipid Panel     Standing Status:   Future     Standing Expiration Date:   8/17/2024    CBC with Auto Differential     Standing Status:   Future     Standing Expiration Date:   8/17/2024       Patient Instructions   Resume losartan hctz at half dose  Recommend trail claritin + flonase for PND/AR  Recommend labs, fasting per my ordders    See me in 6 mo for bp check, edema check or sooner if you need me  Mammogram ordered       CHIEF COMPLAINT  Chief Complaint   Patient presents with    Follow-up    Hypertension     Pt states she ran out her her BP medication about 2-3 months ago. Tracey Lacey is a 38 y.o. female presenting today for follow up    HTN: patient reports presently off  medications. No chest pain, sob, headache, blurred vision, leg swelling, diaphoresis, falls. is not compliant with medications as prescribed.  is not checking blood

## 2023-08-17 NOTE — PATIENT INSTRUCTIONS
Resume losartan hctz at half dose  Recommend trail claritin + flonase for PND/AR  Recommend labs, fasting per my ordders    See me in 6 mo for bp check, edema check or sooner if you need me  Mammogram ordered

## 2023-12-27 ENCOUNTER — OFFICE VISIT (OUTPATIENT)
Age: 43
End: 2023-12-27

## 2023-12-27 VITALS
TEMPERATURE: 99.2 F | HEART RATE: 72 BPM | RESPIRATION RATE: 18 BRPM | DIASTOLIC BLOOD PRESSURE: 93 MMHG | BODY MASS INDEX: 32.24 KG/M2 | OXYGEN SATURATION: 97 % | SYSTOLIC BLOOD PRESSURE: 143 MMHG | WEIGHT: 164.2 LBS | HEIGHT: 60 IN

## 2023-12-27 DIAGNOSIS — R09.89 CHEST CONGESTION: ICD-10-CM

## 2023-12-27 DIAGNOSIS — R05.9 COUGH IN ADULT: ICD-10-CM

## 2023-12-27 DIAGNOSIS — J06.9 VIRAL UPPER RESPIRATORY TRACT INFECTION: Primary | ICD-10-CM

## 2023-12-27 LAB
INFLUENZA A ANTIGEN, POC: NEGATIVE
INFLUENZA B ANTIGEN, POC: NEGATIVE
Lab: NORMAL
PERFORMING INSTRUMENT: NORMAL
QC PASS/FAIL: NORMAL
SARS-COV-2, POC: NORMAL
STREP PYOGENES DNA, POC: NEGATIVE
VALID INTERNAL CONTROL, POC: NORMAL
VALID INTERNAL CONTROL, POC: NORMAL

## 2023-12-27 RX ORDER — METHYLPREDNISOLONE 4 MG/1
TABLET ORAL
Qty: 1 KIT | Refills: 0 | Status: SHIPPED | OUTPATIENT
Start: 2023-12-27

## 2023-12-27 RX ORDER — BENZONATATE 200 MG/1
200 CAPSULE ORAL 3 TIMES DAILY PRN
Qty: 21 CAPSULE | Refills: 0 | Status: SHIPPED | OUTPATIENT
Start: 2023-12-27 | End: 2024-01-03

## 2023-12-27 NOTE — PATIENT INSTRUCTIONS
Thank you for choosing [facility] ! Your Influenza A and B, COVID, Strep tests were all NEGATIVE! Warm salt water gargles for sore throat. Use throat lozenges such as Cepacol or Chloraseptic spray for throat discomfort. Avoid citrus foods as these may irritate throat discomfort. Increase fluid intake to maintain hydration  OTC Mucinex if you start to experience cough to loosen secretions. Recommend for immune support:  Zinc 100 mg 2 times per day for 10 days. Vitamin C 500 mg 3 times oer day for 10 days. Vitamin D2 2000 IU daily for 10 days. Elderberry daily. Humidifier. Inhale hot steam from a shower or bath. OTC Flonase as needed. Prescription for Tessalon Perls and a Medrol dose pack was sent to your pharmacy. Follow up with your PCP if symptoms persist, worsen, you starting running a fever of 100.6 degrees F if not relieved by OTC Tylenol and ibuprofen. If you start to experience chest pain and/or shortness of breath, seek emergency care or call 9-1-1.

## 2023-12-27 NOTE — PROGRESS NOTES
Jocelynn Hernandez (:  1980) is a 41 y.o. female,New patient, here for evaluation of the following chief complaint(s):  Pharyngitis (Pt having sore throat, Right ear clogged, chest congestion, lost voice yesterday, headaches since a week ago. )      ASSESSMENT/PLAN:  Visit Diagnoses and Associated Orders       Viral upper respiratory tract infection    -  Primary    methylPREDNISolone (MEDROL, RUPAL,) 4 MG tablet [4991]           Chest congestion        AMB POC INFLUENZA A  AND B REAL-TIME RT-PCR [57053 CPT(R)]      AMB POC STREP GO A DIRECT, DNA PROBE [02690 CPT(R)]      POCT COVID-19, Antigen [JZN364 Custom]      methylPREDNISolone (MEDROL, RUPAL,) 4 MG tablet [4991]           Cough in adult        benzonatate (TESSALON) 200 MG capsule [63908]      methylPREDNISolone (MEDROL, RUPAL,) 4 MG tablet [4991]                 Results for orders placed or performed in visit on 23   AMB POC INFLUENZA A  AND B REAL-TIME RT-PCR   Result Value Ref Range    Valid Internal Control, POC PASS     Influenza A Antigen, POC Negative     Influenza B Antigen, POC Negative    AMB POC STREP GO A DIRECT, DNA PROBE   Result Value Ref Range    Valid Internal Control, POC PASS     Strep pyogenes DNA, POC Negative    POCT COVID-19, Antigen   Result Value Ref Range    SARS-COV-2, POC Not-Detected Not Detected    Lot Number [id number]     QC Pass/Fail PASS     Performing Instrument BinaxNOW       Thank you for choosing [facility] ! Your Influenza A and B, COVID, Strep tests were all NEGATIVE! Warm salt water gargles for sore throat. Use throat lozenges such as Cepacol or Chloraseptic spray for throat discomfort. Avoid citrus foods as these may irritate throat discomfort. Increase fluid intake to maintain hydration  OTC Mucinex if you start to experience cough to loosen secretions. Recommend for immune support:  Zinc 100 mg 2 times per day for 10 days. Vitamin C 500 mg 3 times oer day for 10 days.   Vitamin D2 2000 IU daily for 10

## 2024-01-04 ENCOUNTER — OFFICE VISIT (OUTPATIENT)
Age: 44
End: 2024-01-04

## 2024-01-04 VITALS
HEART RATE: 72 BPM | RESPIRATION RATE: 20 BRPM | WEIGHT: 164.6 LBS | HEIGHT: 60 IN | DIASTOLIC BLOOD PRESSURE: 103 MMHG | SYSTOLIC BLOOD PRESSURE: 152 MMHG | OXYGEN SATURATION: 99 % | BODY MASS INDEX: 32.31 KG/M2 | TEMPERATURE: 98.5 F

## 2024-01-04 DIAGNOSIS — R22.30 LOCALIZED SWELLING OF FOREARM: Primary | ICD-10-CM

## 2024-01-04 NOTE — PROGRESS NOTES
Subjective     Chief Complaint   Patient presents with    swelling of (l) arm/hand       Patient ID:  Rodriguez Abraham is a 43 y.o. female.    Patient is 43 year old female presenting with swelling to her left arm/hand.  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            Review of Systems   Musculoskeletal:  Positive for arthralgias (+ phalens test).       Past Medical History:   Diagnosis Date    Depression     Essential hypertension 5/9/2017    Takes HCTZ daily    Hypothyroidism, adult 9/11/2015    Motion sickness     Radiculopathy of cervical region 9/11/2015       Past Surgical History:   Procedure Laterality Date    GYN  2020    hystorectomy    GYN  2007    D&C, tubal ligation    HYSTERECTOMY, VAGINAL Bilateral     ORTHOPEDIC SURGERY Left 01/2018    Foot surgery / Callus removed        Family History   Problem Relation Age of Onset    Hypertension Mother     Other Father         Hypercholesterol    Anesth Problems Neg Hx        Allergies   Allergen Reactions    Metronidazole Diarrhea    Ace Inhibitors Cough       Social History     Tobacco Use    Smoking status: Never    Smokeless tobacco: Never   Vaping Use    Vaping Use: Never used   Substance Use Topics    Alcohol use: Yes     Comment: socially    Drug use: No       Objective   Vitals:    01/04/24 1328   BP: (!) 152/103   Pulse: 72   Resp: 20   Temp: 98.5 °F (36.9 °C)   SpO2: 99%     Physical Exam    Assessment & Plan     Diagnoses and all orders for this visit:  Localized swelling of forearm  -     XR WRIST LEFT (MIN 3 VIEWS); Future      No orders to display     Suspect carpal tunnel d/t constant repetitive motion  Ibuprofen/Tylenol for pain  You may continue to ice the arm  We will call you with your x-ray results  Brace on left wrist       I have discussed the results, diagnosis and treatment plan with the patient.  The patient also understands that early in the process of an illness, an urgent care workup can

## 2024-01-04 NOTE — PATIENT INSTRUCTIONS
Thank you for visiting [facility] today.    Ibuprofen/Tylenol for pain  You may continue to ice the arm    We will call you with your x-ray results

## 2024-02-19 ENCOUNTER — HOSPITAL ENCOUNTER (OUTPATIENT)
Facility: HOSPITAL | Age: 44
Setting detail: RECURRING SERIES
Discharge: HOME | End: 2024-02-22
Payer: COMMERCIAL

## 2024-02-19 PROCEDURE — 97162 PT EVAL MOD COMPLEX 30 MIN: CPT

## 2024-02-19 NOTE — THERAPY EVALUATION
Physical Therapy at [employer],   a part of [facility]  [address], [address]  [place], [place] [address]  Phone: [phone]  Fax: [phone]     PHYSICAL THERAPY - MEDICARE EVALUATION/PLAN OF CARE NOTE (updated 3/23)    Date: 2024        Patient Name:  Rodriguez Abraham :  1980   Medical   Diagnosis:  Pelvic pain [R10.2] Treatment Diagnosis:  M62.89  OTHER SPECIFIED DISORDERS OF MUSCLE and R39.89  Other signs and symptoms of genitourinary system    Referral Source:  Aida Johnson* Provider #:  [id number]                Insurance: Payor: AET [facility] [place] / [place] [employer] [place] / Product Type: *No Product type* /      Patient  verified yes     Visit #   Current  / Total 1 awaiting   Time   In / Out 300p 400p   Total Treatment Time 60   Total Timed Codes 50   1:1 Treatment Time 50    MC BC Totals Reminder:  bill using total billable   min of TIMED therapeutic procedures and modalities.   8-22 min = 1 unit; 23-37 min = 2 units; 38-52 min = 3 units;  53-67 min = 4 units; 68-82 min = 5 units     SUBJECTIVE  Pain Level (0-10 scale): 10/10 worst  []constant []intermittent []improving []worsening []no change since onset    Any medication changes, allergies to medications, adverse drug reactions, diagnosis change, or new procedure performed?: [x] No    [] Yes (see summary sheet for update)  Medications: Verified on Patient Summary List    Subjective functional status/changes:       Has had pain with vaginal penetration- IC or exams. Had Hysterectomy in  but pain stayed. ?endometriosis. Has been happening since after childbirth. Feels cramping in abdomen and sometimes burning.     Denies problems with bladder control. Empties fully. Reports BMs every other day. Type depends on what she eats.     Hx 4 pregnancies and 2 . Some tearing with 1st.    Start of Care: 2024  Onset Date: chronic  Current symptoms/Complaints:

## 2024-02-28 ENCOUNTER — APPOINTMENT (OUTPATIENT)
Facility: HOSPITAL | Age: 44
End: 2024-02-28
Payer: COMMERCIAL

## 2024-03-04 ENCOUNTER — HOSPITAL ENCOUNTER (OUTPATIENT)
Facility: HOSPITAL | Age: 44
Setting detail: RECURRING SERIES
Discharge: HOME | End: 2024-03-07
Payer: COMMERCIAL

## 2024-03-04 PROCEDURE — 97112 NEUROMUSCULAR REEDUCATION: CPT

## 2024-03-04 PROCEDURE — 97110 THERAPEUTIC EXERCISES: CPT

## 2024-03-04 NOTE — PROGRESS NOTES
PHYSICAL THERAPY - DAILY TREATMENT NOTE (updated 3/23)    Date: 3/4/2024        Patient Name:  Rodriguez Abraham :  1980   Medical   Diagnosis:  Pelvic pain [R10.2] Treatment Diagnosis:  M62.89  OTHER SPECIFIED DISORDERS OF MUSCLE and R39.89  Other signs and symptoms of genitourinary system    Referral Source:  Aida Johnson* Insurance:   Payor: [facility] [place] / Plan: [facility]ET [facility] [place] / Product Type: *No Product type* /                   Patient  verified yes     Visit #   Current  / Total 2 12   Time   In / Out 215p 258p   Total Treatment Time 43   Total Timed Codes 43     SUBJECTIVE    Pain Level (0-10 scale): 0    Any medication changes, allergies to medications, adverse drug reactions, diagnosis change, or new procedure performed?: [x] No    [] Yes (see summary sheet for update)  Medications: Verified on Patient Summary List    Subjective functional status/changes:       Hasn't been feeling as heavy. Keep up with breathing and squatty potty. Not man stretches    OBJECTIVE    Therapeutic Procedures:  Tx Min Billable or 1:1 Min (if diff from Tx Min) Procedure, Rationale, Specifics   18  58381 Neuromuscular Re-Education (timed):  improve balance, coordination, kinesthetic sense, posture, core stability and proprioception to improve patient's ability to develop conscious control of individual muscles and awareness of position of extremities in order to progress to PLOF and address remaining functional goals. (see flow sheet as applicable)     Details if applicable:     25  58359 Therapeutic Exercise (timed):  increase ROM, strength, coordination, balance, and proprioception to improve patient's ability to progress to PLOF and address remaining functional goals. (see flow sheet as applicable)     Details if applicable:     43     Total Total     [x]  Patient Education billed concurrently with other procedures   [x] Review HEP    [] Progressed/Changed HEP, detail:    [] Other

## 2024-03-13 ENCOUNTER — APPOINTMENT (OUTPATIENT)
Facility: HOSPITAL | Age: 44
End: 2024-03-13
Payer: COMMERCIAL

## 2024-03-20 ENCOUNTER — APPOINTMENT (OUTPATIENT)
Facility: HOSPITAL | Age: 44
End: 2024-03-20
Payer: COMMERCIAL

## 2024-03-27 ENCOUNTER — APPOINTMENT (OUTPATIENT)
Facility: HOSPITAL | Age: 44
End: 2024-03-27
Payer: COMMERCIAL

## 2024-09-10 ENCOUNTER — OFFICE VISIT (OUTPATIENT)
Age: 44
End: 2024-09-10

## 2024-09-10 VITALS
TEMPERATURE: 98.6 F | SYSTOLIC BLOOD PRESSURE: 118 MMHG | OXYGEN SATURATION: 99 % | HEIGHT: 60 IN | RESPIRATION RATE: 16 BRPM | BODY MASS INDEX: 29.57 KG/M2 | DIASTOLIC BLOOD PRESSURE: 78 MMHG | HEART RATE: 94 BPM | WEIGHT: 150.6 LBS

## 2024-09-10 DIAGNOSIS — R51.9 ACUTE NONINTRACTABLE HEADACHE, UNSPECIFIED HEADACHE TYPE: Primary | ICD-10-CM

## 2024-09-10 RX ORDER — KETOROLAC TROMETHAMINE 30 MG/ML
30 INJECTION, SOLUTION INTRAMUSCULAR; INTRAVENOUS ONCE
Status: COMPLETED | OUTPATIENT
Start: 2024-09-10 | End: 2024-09-10

## 2024-09-10 RX ORDER — LOSARTAN POTASSIUM AND HYDROCHLOROTHIAZIDE 12.5; 5 MG/1; MG/1
1 TABLET ORAL DAILY
Qty: 30 TABLET | Refills: 0 | Status: SHIPPED | OUTPATIENT
Start: 2024-09-10

## 2024-09-10 RX ADMIN — KETOROLAC TROMETHAMINE 30 MG: 30 INJECTION, SOLUTION INTRAMUSCULAR; INTRAVENOUS at 10:50

## 2024-10-09 RX ORDER — LOSARTAN POTASSIUM AND HYDROCHLOROTHIAZIDE 12.5; 5 MG/1; MG/1
1 TABLET ORAL DAILY
Qty: 90 TABLET | Refills: 1 | OUTPATIENT
Start: 2024-10-09

## 2025-01-17 ENCOUNTER — OFFICE VISIT (OUTPATIENT)
Age: 45
End: 2025-01-17

## 2025-01-17 VITALS
DIASTOLIC BLOOD PRESSURE: 71 MMHG | WEIGHT: 150 LBS | OXYGEN SATURATION: 98 % | TEMPERATURE: 98.4 F | HEART RATE: 83 BPM | RESPIRATION RATE: 18 BRPM | BODY MASS INDEX: 29.29 KG/M2 | SYSTOLIC BLOOD PRESSURE: 110 MMHG

## 2025-01-17 DIAGNOSIS — B35.1 FUNGAL NAIL INFECTION: Primary | ICD-10-CM

## 2025-01-17 DIAGNOSIS — Z75.8 DOES NOT HAVE PRIMARY CARE PROVIDER: ICD-10-CM

## 2025-01-17 DIAGNOSIS — Z76.0 ENCOUNTER FOR MEDICATION REFILL: ICD-10-CM

## 2025-01-17 RX ORDER — LOSARTAN POTASSIUM AND HYDROCHLOROTHIAZIDE 12.5; 5 MG/1; MG/1
1 TABLET ORAL DAILY
Qty: 30 TABLET | Refills: 0 | Status: SHIPPED | OUTPATIENT
Start: 2025-01-17

## 2025-01-17 RX ORDER — CICLOPIROX 80 MG/ML
SOLUTION TOPICAL
Qty: 6 ML | Refills: 0 | Status: SHIPPED | OUTPATIENT
Start: 2025-01-17

## 2025-01-17 NOTE — PATIENT INSTRUCTIONS
Thank you for visiting [facility] today.    Apply medication once nightly  Trim fingernail as appropriate  Follow up with PCP      Follow up with your PCP if no improvement in 5-7 days.
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
## 2025-01-18 PROBLEM — G89.29 CHRONIC PELVIC PAIN IN FEMALE: Status: ACTIVE | Noted: 2023-10-18

## 2025-01-18 PROBLEM — R10.2 CHRONIC PELVIC PAIN IN FEMALE: Status: ACTIVE | Noted: 2023-10-18

## 2025-01-18 PROBLEM — N93.9 ABNORMAL UTERINE BLEEDING: Status: ACTIVE | Noted: 2018-07-23

## 2025-01-18 PROBLEM — N89.8 VAGINAL DISCHARGE: Status: ACTIVE | Noted: 2023-10-18

## 2025-02-17 ENCOUNTER — OFFICE VISIT (OUTPATIENT)
Age: 45
End: 2025-02-17

## 2025-02-17 VITALS
HEART RATE: 102 BPM | WEIGHT: 150.6 LBS | SYSTOLIC BLOOD PRESSURE: 122 MMHG | TEMPERATURE: 100.9 F | RESPIRATION RATE: 16 BRPM | OXYGEN SATURATION: 98 % | DIASTOLIC BLOOD PRESSURE: 84 MMHG | BODY MASS INDEX: 29.57 KG/M2 | HEIGHT: 60 IN

## 2025-02-17 DIAGNOSIS — M79.10 MYALGIA: ICD-10-CM

## 2025-02-17 DIAGNOSIS — R68.89 FLU-LIKE SYMPTOMS: Primary | ICD-10-CM

## 2025-02-17 DIAGNOSIS — J02.9 SORE THROAT: ICD-10-CM

## 2025-02-17 LAB
BILIRUBIN, URINE, POC: NORMAL
BLOOD URINE, POC: NEGATIVE
GLUCOSE URINE, POC: NEGATIVE
INFLUENZA A ANTIGEN, POC: NEGATIVE
INFLUENZA B ANTIGEN, POC: NEGATIVE
KETONES, URINE, POC: NORMAL
LEUKOCYTE ESTERASE, URINE, POC: NEGATIVE
Lab: NORMAL
NITRITE, URINE, POC: NEGATIVE
PERFORMING INSTRUMENT: NORMAL
PH, URINE, POC: 6 (ref 4.6–8)
PROTEIN,URINE, POC: NORMAL
QC PASS/FAIL: NORMAL
S PYO AG THROAT QL: NORMAL
SARS-COV-2, POC: NORMAL
SPECIFIC GRAVITY, URINE, POC: 1.02 (ref 1–1.03)
URINALYSIS CLARITY, POC: CLEAR
URINALYSIS COLOR, POC: NORMAL
UROBILINOGEN, POC: NORMAL MG/DL

## 2025-02-17 RX ORDER — AZITHROMYCIN 250 MG/1
TABLET, FILM COATED ORAL
Qty: 6 TABLET | Refills: 0 | Status: SHIPPED | OUTPATIENT
Start: 2025-02-17 | End: 2025-02-27

## 2025-02-17 RX ORDER — OSELTAMIVIR PHOSPHATE 75 MG/1
75 CAPSULE ORAL 2 TIMES DAILY
Qty: 10 CAPSULE | Refills: 0 | Status: SHIPPED | OUTPATIENT
Start: 2025-02-17 | End: 2025-02-22

## 2025-02-17 ASSESSMENT — ENCOUNTER SYMPTOMS
COUGH: 1
GASTROINTESTINAL NEGATIVE: 1
EYES NEGATIVE: 1
VOMITING: 0
NAUSEA: 0
DIARRHEA: 0

## 2025-02-17 NOTE — PROGRESS NOTES
Rodriguez Abraham (:  1980) is a 44 y.o. female,Established patient, here for evaluation of the following chief complaint(s):  Cough (Pt c/o cough, sx onset yesterday. Pt states her chest also hurts when she coughs. ) and Generalized Body Aches (Pt c/o body aches, sx onset yesterday. Pt states she is also experiencing chills and fatigue. Pt denies any SOB or fever. )      Assessment & Plan :  Visit Diagnoses and Associated Orders       Flu-like symptoms    -  Primary    POCT Influenza A/B Antigen [82267 Custom]      POCT COVID-19, Antigen [BXW649 Custom]      POCT rapid strep A [68663 Custom]           Sore throat        POCT Influenza A/B Antigen [58735 Custom]      POCT COVID-19, Antigen [MIY549 Custom]      POCT rapid strep A [07341 Custom]           Myalgia                     • Follow up in 3-4 days if symptoms persist or if symptoms worsen.  Rx is sent to pharmacy.  Recommend patient take medication as prescribed.       Subjective :    Cough  Associated symptoms include chills, a fever and myalgias.   Generalized Body Aches  Associated symptoms: cough, fatigue, fever and myalgias    Associated symptoms: no diarrhea, no nausea and no vomiting         44 y.o. female presents with symptoms of presents with bodyaches and flulike symptoms for the last 2 days.  Patient states that she is feels very sore.  On to her throat is sore.  She states that she has a decrease in appetite.  Patient states that she is unsure if she has been exposed anyone's been sick with any upper respiratory symptoms.  Denies any chest pain shortness of breath or difficulty breathing.         Vitals:    25 1133 25 1137   BP: 130/85 122/84   Site: Left Upper Arm Right Upper Arm   Position: Sitting Sitting   Cuff Size: Medium Adult Medium Adult   Pulse: (!) 104 (!) 102   Resp: 16    Temp: (!) 100.9 °F (38.3 °C)    TempSrc: Oral    SpO2: 96% 98%   Weight: 68.3 kg (150 lb 9.6 oz)    Height: 1.524 m (5')        No results found

## 2025-02-24 ENCOUNTER — TELEPHONE (OUTPATIENT)
Age: 45
End: 2025-02-24

## 2025-02-24 NOTE — TELEPHONE ENCOUNTER
----- Message from Paulie SUBRAMANIAN sent at 2/21/2025  3:52 PM EST -----  Regarding: [facility] Appointment Request  [facility] Appointment Request    Patient needs appointment for [facility] Appointment Type: New to Provider.    Patient Requested Dates(s):march 12   Patient Requested Time:any time  Provider Name:Cesar Leija DO or Nicole Dietrich MD    Reason for Appointment Request: New Patient - Requested Provider unavailable  --------------------------------------------------------------------------------------------------------------------------    Relationship to Patient: Self     Call Back Information: OK to leave message on voicemail  Preferred Call Back Number:[phone]

## 2025-03-03 ENCOUNTER — HOSPITAL ENCOUNTER (EMERGENCY)
Facility: HOSPITAL | Age: 45
Discharge: HOME | End: 2025-03-03
Attending: STUDENT IN AN ORGANIZED HEALTH CARE EDUCATION/TRAINING PROGRAM
Payer: COMMERCIAL

## 2025-03-03 ENCOUNTER — APPOINTMENT (OUTPATIENT)
Facility: HOSPITAL | Age: 45
End: 2025-03-03
Payer: COMMERCIAL

## 2025-03-03 VITALS
DIASTOLIC BLOOD PRESSURE: 99 MMHG | HEIGHT: 60 IN | WEIGHT: 150 LBS | HEART RATE: 77 BPM | OXYGEN SATURATION: 100 % | SYSTOLIC BLOOD PRESSURE: 141 MMHG | TEMPERATURE: 98.6 F | RESPIRATION RATE: 17 BRPM | BODY MASS INDEX: 29.45 KG/M2

## 2025-03-03 DIAGNOSIS — S16.1XXA STRAIN OF NECK MUSCLE, INITIAL ENCOUNTER: ICD-10-CM
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
## 2025-03-03 PROCEDURE — 71046 X-RAY EXAM CHEST 2 VIEWS: CPT

## 2025-03-03 PROCEDURE — 72050 X-RAY EXAM NECK SPINE 4/5VWS: CPT

## 2025-03-03 PROCEDURE — 99283 EMERGENCY DEPT VISIT LOW MDM: CPT

## 2025-03-03 RX ORDER — LIDOCAINE 50 MG/G
1 PATCH TOPICAL DAILY
Qty: 10 PATCH | Refills: 0 | Status: SHIPPED | OUTPATIENT
Start: 2025-03-03 | End: 2025-03-13

## 2025-03-03 RX ORDER — IBUPROFEN 800 MG/1
800 TABLET, FILM COATED ORAL 3 TIMES DAILY PRN
Qty: 90 TABLET | Refills: 0 | Status: SHIPPED | OUTPATIENT
Start: 2025-03-03

## 2025-03-03 RX ORDER — METHOCARBAMOL 750 MG/1
750 TABLET, FILM COATED ORAL 4 TIMES DAILY PRN
Qty: 40 TABLET | Refills: 0 | Status: SHIPPED | OUTPATIENT
Start: 2025-03-03 | End: 2025-03-13

## 2025-03-03 ASSESSMENT — PAIN SCALES - GENERAL: PAINLEVEL_OUTOF10: 7

## 2025-03-03 ASSESSMENT — PAIN - FUNCTIONAL ASSESSMENT: PAIN_FUNCTIONAL_ASSESSMENT: 0-10

## 2025-03-03 ASSESSMENT — PAIN DESCRIPTION - LOCATION: LOCATION: BACK;SHOULDER

## 2025-03-03 ASSESSMENT — PAIN DESCRIPTION - ORIENTATION: ORIENTATION: UPPER
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
## 2025-03-06 ENCOUNTER — PATIENT MESSAGE (OUTPATIENT)
Age: 45
End: 2025-03-06

## 2025-05-12 SDOH — HEALTH STABILITY: PHYSICAL HEALTH: ON AVERAGE, HOW MANY DAYS PER WEEK DO YOU ENGAGE IN MODERATE TO STRENUOUS EXERCISE (LIKE A BRISK WALK)?: 0 DAYS

## 2025-05-12 SDOH — HEALTH STABILITY: PHYSICAL HEALTH: ON AVERAGE, HOW MANY MINUTES DO YOU ENGAGE IN EXERCISE AT THIS LEVEL?: 0 MIN

## 2025-05-15 ENCOUNTER — OFFICE VISIT (OUTPATIENT)
Age: 45
End: 2025-05-15
Payer: COMMERCIAL

## 2025-05-15 VITALS
SYSTOLIC BLOOD PRESSURE: 126 MMHG | HEIGHT: 60 IN | WEIGHT: 152 LBS | BODY MASS INDEX: 29.84 KG/M2 | OXYGEN SATURATION: 100 % | TEMPERATURE: 98.8 F | HEART RATE: 64 BPM | DIASTOLIC BLOOD PRESSURE: 83 MMHG | RESPIRATION RATE: 17 BRPM

## 2025-05-15 DIAGNOSIS — L74.0 HEAT RASH: ICD-10-CM

## 2025-05-15 DIAGNOSIS — I10 PRIMARY HYPERTENSION: Primary | ICD-10-CM

## 2025-05-15 DIAGNOSIS — Z01.419 WELL WOMAN EXAM: ICD-10-CM

## 2025-05-15 DIAGNOSIS — E03.9 HYPOTHYROIDISM, UNSPECIFIED TYPE: ICD-10-CM

## 2025-05-15 PROCEDURE — 3074F SYST BP LT 130 MM HG: CPT

## 2025-05-15 PROCEDURE — G2211 COMPLEX E/M VISIT ADD ON: HCPCS

## 2025-05-15 PROCEDURE — 99214 OFFICE O/P EST MOD 30 MIN: CPT

## 2025-05-15 PROCEDURE — 3079F DIAST BP 80-89 MM HG: CPT

## 2025-05-15 RX ORDER — HYDROCHLOROTHIAZIDE 12.5 MG/1
12.5 CAPSULE ORAL EVERY MORNING
Qty: 90 CAPSULE | Refills: 1 | Status: SHIPPED | OUTPATIENT
Start: 2025-05-15

## 2025-05-15 SDOH — ECONOMIC STABILITY: FOOD INSECURITY: WITHIN THE PAST 12 MONTHS, THE FOOD YOU BOUGHT JUST DIDN'T LAST AND YOU DIDN'T HAVE MONEY TO GET MORE.: PATIENT DECLINED

## 2025-05-15 SDOH — ECONOMIC STABILITY: FOOD INSECURITY: WITHIN THE PAST 12 MONTHS, YOU WORRIED THAT YOUR FOOD WOULD RUN OUT BEFORE YOU GOT MONEY TO BUY MORE.: PATIENT DECLINED

## 2025-05-15 ASSESSMENT — PATIENT HEALTH QUESTIONNAIRE - PHQ9
10. IF YOU CHECKED OFF ANY PROBLEMS, HOW DIFFICULT HAVE THESE PROBLEMS MADE IT FOR YOU TO DO YOUR WORK, TAKE CARE OF THINGS AT HOME, OR GET ALONG WITH OTHER PEOPLE: NOT DIFFICULT AT ALL
SUM OF ALL RESPONSES TO PHQ QUESTIONS 1-9: 7
6. FEELING BAD ABOUT YOURSELF - OR THAT YOU ARE A FAILURE OR HAVE LET YOURSELF OR YOUR FAMILY DOWN: SEVERAL DAYS
SUM OF ALL RESPONSES TO PHQ QUESTIONS 1-9: 7
SUM OF ALL RESPONSES TO PHQ QUESTIONS 1-9: 7
3. TROUBLE FALLING OR STAYING ASLEEP: SEVERAL DAYS
7. TROUBLE CONCENTRATING ON THINGS, SUCH AS READING THE NEWSPAPER OR WATCHING TELEVISION: NOT AT ALL
9. THOUGHTS THAT YOU WOULD BE BETTER OFF DEAD, OR OF HURTING YOURSELF: NOT AT ALL
2. FEELING DOWN, DEPRESSED OR HOPELESS: MORE THAN HALF THE DAYS
4. FEELING TIRED OR HAVING LITTLE ENERGY: SEVERAL DAYS
5. POOR APPETITE OR OVEREATING: NOT AT ALL
8. MOVING OR SPEAKING SO SLOWLY THAT OTHER PEOPLE COULD HAVE NOTICED. OR THE OPPOSITE, BEING SO FIGETY OR RESTLESS THAT YOU HAVE BEEN MOVING AROUND A LOT MORE THAN USUAL: NOT AT ALL
1. LITTLE INTEREST OR PLEASURE IN DOING THINGS: MORE THAN HALF THE DAYS
SUM OF ALL RESPONSES TO PHQ QUESTIONS 1-9: 7
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
## 2025-05-15 NOTE — PROGRESS NOTES
Roomed by name and .    Chief Complaint   Patient presents with    Establish Care    Hypertension        Vitals:    05/15/25 1338   BP: 126/83   Pulse: 64   Resp: 17   Temp: 98.8 °F (37.1 °C)   TempSrc: Temporal   SpO2: 100%   Weight: 68.9 kg (152 lb)   Height: 1.524 m (5')          \"Have you been to the ER, urgent care clinic since your last visit?  Hospitalized since your last visit?\"    N/A    “Have you seen or consulted any other health care providers outside of [facility] since your last visit?”    N/A                     Click Here for Release of Records Request

## 2025-05-16 LAB
ANION GAP SERPL CALC-SCNC: 4 MMOL/L (ref 2–12)
BUN SERPL-MCNC: 12 MG/DL (ref 6–20)
BUN/CREAT SERPL: 15 (ref 12–20)
CALCIUM SERPL-MCNC: 8.9 MG/DL (ref 8.5–10.1)
CHLORIDE SERPL-SCNC: 107 MMOL/L (ref 97–108)
CO2 SERPL-SCNC: 27 MMOL/L (ref 21–32)
CREAT SERPL-MCNC: 0.82 MG/DL (ref 0.55–1.02)
GLUCOSE SERPL-MCNC: 94 MG/DL (ref 65–100)
HBV SURFACE AB SER QL: REACTIVE
HBV SURFACE AB SER-ACNC: 10.21 MIU/ML
HBV SURFACE AG SER QL: 0.27 INDEX
HBV SURFACE AG SER QL: NEGATIVE
POTASSIUM SERPL-SCNC: 3.9 MMOL/L (ref 3.5–5.1)
SODIUM SERPL-SCNC: 138 MMOL/L (ref 136–145)
TSH SERPL DL<=0.05 MIU/L-ACNC: 1.22 UIU/ML (ref 0.36–3.74)

## 2025-05-16 NOTE — PROGRESS NOTES
[facility] Residency Attending Attestation: I have seen and evaluated the patient, repeating/performing the critical or key elements of the service. I discussed the findings, assessment and plan with the resident and agree with the resident's documentation.    Piter Simpson MD, MPH  [facility]

## 2025-05-17 ENCOUNTER — RESULTS FOLLOW-UP (OUTPATIENT)
Facility: HOSPITAL | Age: 45
End: 2025-05-17

## 2025-05-17 LAB — VZV IGG SER IA-ACNC: REACTIVE

## (undated) DEVICE — INFECTION CONTROL KIT SYS

## (undated) DEVICE — MARKER SKN RUL VIO STRL

## (undated) DEVICE — Z INACTIVE USE 2527070 DRAPE SURG W40XL44IN UNDERBUTTOCK SMS POLYPR W/ PCH BK DISP

## (undated) DEVICE — TUBE ST FLD CTRL AQUILEX INFLO --

## (undated) DEVICE — PREP PAD BNS: Brand: CONVERTORS

## (undated) DEVICE — TRAY PREP DRY W/ PREM GLV 2 APPL 6 SPNG 2 UNDPD 1 OVERWRAP

## (undated) DEVICE — CLICKLINE SCISSORS INSERT: Brand: CLICKLINE

## (undated) DEVICE — SURGICAL PROCEDURE PACK GYN LAPAROSCOPY CUST SMH LF

## (undated) DEVICE — GARMENT,MEDLINE,DVT,INT,CALF,MED, GEN2: Brand: MEDLINE

## (undated) DEVICE — STERILE POLYISOPRENE POWDER-FREE SURGICAL GLOVES: Brand: PROTEXIS

## (undated) DEVICE — PERI/GYN PACK: Brand: CONVERTORS

## (undated) DEVICE — SET SEALS HYSTEROSCOPE DISP -- MYOSURE  EA=10

## (undated) DEVICE — SCISSORS ENDOSCP DIA5MM CRV MPLR CAUT W/ RATCH HNDL

## (undated) DEVICE — SOLUTION IRRIG 3000ML 0.9% SOD CHL FLX CONT 0797208] ICU MEDICAL INC]

## (undated) DEVICE — DEVICE TISS RETRV 3MMX25.25IN -- MYOSURE

## (undated) DEVICE — MARYLAND JAW LAPAROSCOPIC SEALER/DIVIDER COATED: Brand: LIGASURE

## (undated) DEVICE — INSUFFLATION NEEDLE TO ESTABLISH PNEUMOPERITONEUM.: Brand: INSUFFLATION NEEDLE

## (undated) DEVICE — PAD SANIT NPKN 4IN GRD

## (undated) DEVICE — DERMABOND SKIN ADH 0.7ML -- DERMABOND ADVANCED 12/BX

## (undated) DEVICE — TOWEL SURG W17XL27IN STD BLU COT NONFENESTRATED PREWASHED

## (undated) DEVICE — VISUALIZATION SYSTEM: Brand: CLEARIFY

## (undated) DEVICE — TROCAR: Brand: KII SLEEVE

## (undated) DEVICE — TUBE ST FLD AQUILEX OUTFLO --

## (undated) DEVICE — Device

## (undated) DEVICE — TOTAL TRAY, DB, 100% SILI FOLEY, 16FR 10: Brand: MEDLINE

## (undated) DEVICE — FILTER SMK EVAC FLO CLR MEGADYNE

## (undated) DEVICE — (D)PREP SKN CHLRAPRP APPL 26ML -- CONVERT TO ITEM 371833

## (undated) DEVICE — PAD,SANITARY,11 IN,MAXI,N-STRL,IND WRAP: Brand: MEDLINE

## (undated) DEVICE — SUTURE V-LOC 180 SZ 2-0 L9IN ABSRB VLT GS-21 L37MM 1/2 CIR VLOCM0345

## (undated) DEVICE — STERILE POLYISOPRENE POWDER-FREE SURGICAL GLOVES WITH EMOLLIENT COATING: Brand: PROTEXIS

## (undated) DEVICE — BLADE ASSEMB CLP HAIR FINE --

## (undated) DEVICE — NEEDLE SPNL 22GA L3.5IN BLK HUB S STL REG WALL FIT STYL W/

## (undated) DEVICE — TROCAR: Brand: KII FIOS FIRST ENTRY

## (undated) DEVICE — DEVON™ KNEE AND BODY STRAP 60" X 3" (1.5 M X 7.6 CM): Brand: DEVON

## (undated) DEVICE — NEEDLE HYPO 22GA L1.5IN BLK S STL HUB POLYPR SHLD REG BVL

## (undated) DEVICE — REM POLYHESIVE ADULT PATIENT RETURN ELECTRODE: Brand: VALLEYLAB

## (undated) DEVICE — SOLUTION IV 1000ML 0.9% SOD CHL

## (undated) DEVICE — LIGHT HANDLE: Brand: DEVON

## (undated) DEVICE — X-RAY SPONGES,16 PLY: Brand: DERMACEA

## (undated) DEVICE — STRAP RESTRAIN W3.5XL19IN TECLIN STRRP POS LEG DURING LITH

## (undated) DEVICE — SUTURE MCRYL SZ 4-0 L27IN ABSRB UD L19MM PS-2 1/2 CIR PRIM Y426H

## (undated) DEVICE — GOWN,SIRUS,NONRNF,SETINSLV,XL,20/CS: Brand: MEDLINE

## (undated) DEVICE — CATH URETH INTMIT ROB 16FR FUN -- CONVERT TO ITEM 179520

## (undated) DEVICE — VCARE LARGE, UTERINE MANIPULATOR, VAGINAL-CERVICAL-AHLUWALIA'S-RETRACTOR-ELEVATOR: Brand: VCARE

## (undated) DEVICE — STRAP,POSITIONING,KNEE/BODY,FOAM,4X60": Brand: MEDLINE

## (undated) DEVICE — KENDALL SCD EXPRESS SLEEVES, KNEE LENGTH, MEDIUM: Brand: KENDALL SCD

## (undated) DEVICE — DRAPE,REIN 53X77,STERILE: Brand: MEDLINE

## (undated) DEVICE — TELFA NON-ADHERENT ABSORBENT DRESSING: Brand: TELFA